# Patient Record
Sex: FEMALE | Race: WHITE | NOT HISPANIC OR LATINO | Employment: OTHER | URBAN - METROPOLITAN AREA
[De-identification: names, ages, dates, MRNs, and addresses within clinical notes are randomized per-mention and may not be internally consistent; named-entity substitution may affect disease eponyms.]

---

## 2023-03-10 PROBLEM — K59.00 CONSTIPATION: Status: ACTIVE | Noted: 2023-03-10

## 2023-03-10 PROBLEM — E55.9 VITAMIN D DEFICIENCY: Status: ACTIVE | Noted: 2023-03-10

## 2023-03-10 PROBLEM — F41.9 ANXIETY: Status: ACTIVE | Noted: 2023-03-10

## 2023-03-10 PROBLEM — K64.9 BLEEDING HEMORRHOIDS: Status: ACTIVE | Noted: 2023-03-10

## 2023-03-10 PROBLEM — I10 HYPERTENSION: Status: ACTIVE | Noted: 2023-03-10

## 2023-03-10 PROBLEM — N39.0 URINARY TRACT INFECTION: Status: ACTIVE | Noted: 2023-03-10

## 2023-03-10 PROBLEM — F43.21 GRIEVING: Status: ACTIVE | Noted: 2023-03-10

## 2023-03-10 PROBLEM — J30.9 ALLERGIC RHINITIS: Status: ACTIVE | Noted: 2023-03-10

## 2023-03-10 PROBLEM — E53.8 B12 DEFICIENCY: Status: ACTIVE | Noted: 2023-03-10

## 2023-03-10 PROBLEM — D50.9 IRON DEFICIENCY ANEMIA: Status: ACTIVE | Noted: 2023-03-10

## 2023-03-10 PROBLEM — B37.31 YEAST VAGINITIS: Status: ACTIVE | Noted: 2023-03-10

## 2023-03-10 PROBLEM — M25.559 JOINT PAIN, HIP: Status: ACTIVE | Noted: 2023-03-10

## 2023-03-10 PROBLEM — R53.83 FATIGUE: Status: ACTIVE | Noted: 2023-03-10

## 2023-03-10 PROBLEM — E78.5 HYPERLIPIDEMIA: Status: ACTIVE | Noted: 2023-03-10

## 2023-03-10 PROBLEM — L85.3 DRY SKIN: Status: ACTIVE | Noted: 2023-03-10

## 2023-03-10 PROBLEM — N18.9 CKD (CHRONIC KIDNEY DISEASE): Status: ACTIVE | Noted: 2023-03-10

## 2023-03-10 PROBLEM — K62.5 RECTAL BLEEDING: Status: ACTIVE | Noted: 2023-03-10

## 2023-03-10 PROBLEM — G47.00 INSOMNIA: Status: ACTIVE | Noted: 2023-03-10

## 2023-03-10 PROBLEM — J02.9 PHARYNGITIS: Status: ACTIVE | Noted: 2023-03-10

## 2023-03-10 PROBLEM — D64.9 ANEMIA: Status: ACTIVE | Noted: 2023-03-10

## 2023-03-10 PROBLEM — I48.91 AFIB (MULTI): Status: ACTIVE | Noted: 2023-03-10

## 2023-03-10 PROBLEM — L30.9 DERMATITIS: Status: ACTIVE | Noted: 2023-03-10

## 2023-03-10 PROBLEM — E11.9 DIABETES MELLITUS, TYPE 2 (MULTI): Status: ACTIVE | Noted: 2023-03-10

## 2023-03-10 PROBLEM — I42.9 CARDIOMYOPATHY (MULTI): Status: ACTIVE | Noted: 2023-03-10

## 2023-03-10 PROBLEM — R51.9 HEADACHE: Status: ACTIVE | Noted: 2023-03-10

## 2023-03-10 PROBLEM — M79.10 MUSCULAR ACHES: Status: ACTIVE | Noted: 2023-03-10

## 2023-03-10 PROBLEM — F43.9 STRESS AT HOME: Status: ACTIVE | Noted: 2023-03-10

## 2023-03-10 PROBLEM — J20.9 ACUTE BRONCHITIS: Status: RESOLVED | Noted: 2023-03-10 | Resolved: 2023-03-10

## 2023-03-10 PROBLEM — K21.9 GERD (GASTROESOPHAGEAL REFLUX DISEASE): Status: ACTIVE | Noted: 2023-03-10

## 2023-03-10 PROBLEM — L65.9 HAIR THINNING: Status: ACTIVE | Noted: 2023-03-10

## 2023-03-10 PROBLEM — I50.20 SYSTOLIC CHF (MULTI): Status: ACTIVE | Noted: 2023-03-10

## 2023-03-10 PROBLEM — J34.0 CELLULITIS OF NASAL TIP: Status: ACTIVE | Noted: 2023-03-10

## 2023-03-10 PROBLEM — R05.9 COUGH: Status: ACTIVE | Noted: 2023-03-10

## 2023-03-10 PROBLEM — K13.0 ANGULAR CHEILOSIS: Status: ACTIVE | Noted: 2023-03-10

## 2023-03-10 RX ORDER — INSULIN LISPRO 100 [IU]/ML
INJECTION, SOLUTION INTRAVENOUS; SUBCUTANEOUS SEE ADMIN INSTRUCTIONS
COMMUNITY
Start: 2021-12-15 | End: 2024-03-15 | Stop reason: WASHOUT

## 2023-03-10 RX ORDER — BLOOD-GLUCOSE TRANSMITTER
1 EACH MISCELLANEOUS AS NEEDED
COMMUNITY
End: 2023-12-15 | Stop reason: WASHOUT

## 2023-03-10 RX ORDER — BLOOD-GLUCOSE,RECEIVER,CONT
1 EACH MISCELLANEOUS AS NEEDED
COMMUNITY
End: 2023-12-15 | Stop reason: WASHOUT

## 2023-03-10 RX ORDER — ESOMEPRAZOLE MAGNESIUM 40 MG/1
40 CAPSULE, DELAYED RELEASE ORAL DAILY
COMMUNITY
Start: 2021-07-02 | End: 2023-07-12 | Stop reason: SDUPTHER

## 2023-03-10 RX ORDER — SPIRONOLACTONE 25 MG/1
25 TABLET ORAL DAILY
COMMUNITY
End: 2023-07-12 | Stop reason: SDUPTHER

## 2023-03-10 RX ORDER — ATORVASTATIN CALCIUM 20 MG/1
20 TABLET, FILM COATED ORAL DAILY
COMMUNITY
Start: 2013-12-21 | End: 2023-04-18 | Stop reason: SDUPTHER

## 2023-03-10 RX ORDER — VENLAFAXINE HYDROCHLORIDE 150 MG/1
150 CAPSULE, EXTENDED RELEASE ORAL DAILY
COMMUNITY
Start: 2013-03-14 | End: 2023-07-12 | Stop reason: SDUPTHER

## 2023-03-10 RX ORDER — METOPROLOL SUCCINATE 100 MG/1
100 TABLET, EXTENDED RELEASE ORAL 2 TIMES DAILY
COMMUNITY
Start: 2022-01-26 | End: 2023-08-07 | Stop reason: SDUPTHER

## 2023-03-10 RX ORDER — LISINOPRIL 10 MG/1
20 TABLET ORAL DAILY
COMMUNITY
Start: 2022-11-06 | End: 2023-03-16 | Stop reason: SDUPTHER

## 2023-03-10 RX ORDER — BLOOD-GLUCOSE SENSOR
1 EACH MISCELLANEOUS
COMMUNITY

## 2023-03-10 RX ORDER — AMIODARONE HYDROCHLORIDE 200 MG/1
1 TABLET ORAL DAILY
COMMUNITY
End: 2023-06-09 | Stop reason: ALTCHOICE

## 2023-03-10 RX ORDER — METFORMIN HYDROCHLORIDE 500 MG/1
2 TABLET, EXTENDED RELEASE ORAL
COMMUNITY
Start: 2021-12-15

## 2023-03-16 ENCOUNTER — OFFICE VISIT (OUTPATIENT)
Dept: PRIMARY CARE | Facility: CLINIC | Age: 78
End: 2023-03-16
Payer: MEDICARE

## 2023-03-16 VITALS
DIASTOLIC BLOOD PRESSURE: 84 MMHG | HEART RATE: 59 BPM | WEIGHT: 159 LBS | TEMPERATURE: 97.2 F | SYSTOLIC BLOOD PRESSURE: 145 MMHG | RESPIRATION RATE: 18 BRPM | BODY MASS INDEX: 29.08 KG/M2 | OXYGEN SATURATION: 96 %

## 2023-03-16 DIAGNOSIS — F41.9 ANXIETY: ICD-10-CM

## 2023-03-16 DIAGNOSIS — I10 HYPERTENSION, UNSPECIFIED TYPE: Primary | ICD-10-CM

## 2023-03-16 DIAGNOSIS — Z79.4 TYPE 2 DIABETES MELLITUS WITHOUT COMPLICATION, WITH LONG-TERM CURRENT USE OF INSULIN (MULTI): ICD-10-CM

## 2023-03-16 DIAGNOSIS — I42.9 CARDIOMYOPATHY, UNSPECIFIED TYPE (MULTI): ICD-10-CM

## 2023-03-16 DIAGNOSIS — E11.9 TYPE 2 DIABETES MELLITUS WITHOUT COMPLICATION, WITH LONG-TERM CURRENT USE OF INSULIN (MULTI): ICD-10-CM

## 2023-03-16 DIAGNOSIS — E78.5 HYPERLIPIDEMIA, UNSPECIFIED HYPERLIPIDEMIA TYPE: ICD-10-CM

## 2023-03-16 DIAGNOSIS — I48.91 ATRIAL FIBRILLATION, UNSPECIFIED TYPE (MULTI): ICD-10-CM

## 2023-03-16 PROCEDURE — 99214 OFFICE O/P EST MOD 30 MIN: CPT | Performed by: FAMILY MEDICINE

## 2023-03-16 PROCEDURE — 93000 ELECTROCARDIOGRAM COMPLETE: CPT | Performed by: FAMILY MEDICINE

## 2023-03-16 RX ORDER — LISINOPRIL 30 MG/1
30 TABLET ORAL DAILY
Qty: 90 TABLET | Refills: 0 | Status: SHIPPED | OUTPATIENT
Start: 2023-03-16 | End: 2023-05-01 | Stop reason: SDUPTHER

## 2023-03-16 NOTE — PROGRESS NOTES
Subjective   Patient ID: Marissa Rebolledo is a 77 y.o. female who presents for Follow-up (For b ed and sugar readings).    HPI   Patient comes in today in follow-up.  She is feeling much better than last visit as far as her blood pressure is concerned.  She is still anxious about the atrial fibrillation and the cardiac ablation.  She is requesting to know today whether she is still in atrial fib.  Review of Systems   All other systems reviewed and are negative.      Objective   /84   Pulse 59   Temp 36.2 °C (97.2 °F)   Resp 18   Wt 72.1 kg (159 lb)   SpO2 96%   BMI 29.08 kg/m²     Physical Exam  Constitutional:       Appearance: She is well-developed.   HENT:      Head: Normocephalic and atraumatic.      Right Ear: Tympanic membrane, ear canal and external ear normal.      Left Ear: Tympanic membrane, ear canal and external ear normal.      Nose: Nose normal.      Mouth/Throat:      Mouth: Mucous membranes are moist.      Pharynx: Oropharynx is clear.   Eyes:      Extraocular Movements: Extraocular movements intact.      Conjunctiva/sclera: Conjunctivae normal.      Pupils: Pupils are equal, round, and reactive to light.   Cardiovascular:      Rate and Rhythm: Normal rate and regular rhythm.      Heart sounds: Normal heart sounds. No murmur heard.     Comments: I do not detect atrial fib on clinical exam we will get an EKG.  Pulmonary:      Effort: Pulmonary effort is normal.      Breath sounds: Normal breath sounds. No wheezing.   Abdominal:      General: Abdomen is flat. Bowel sounds are normal.      Palpations: Abdomen is soft.   Musculoskeletal:         General: Normal range of motion.   Skin:     General: Skin is warm and dry.   Neurological:      General: No focal deficit present.      Mental Status: She is alert and oriented to person, place, and time. Mental status is at baseline.   Psychiatric:         Mood and Affect: Mood normal.         Behavior: Behavior normal.         Thought Content:  Thought content normal.         Judgment: Judgment normal.         Assessment/Plan   Problem List Items Addressed This Visit       Afib (CMS/[facility])    Anxiety    Diabetes mellitus, type 2 (CMS/HCC)    Hyperlipidemia    Hypertension - Primary    Relevant Medications    lisinopril 30 mg tablet    Cardiomyopathy (CMS/[facility])   EKG shows no evidence of A-fib at this time.  Follow-up with cardiology for further assessment and need for cardiac ablation.  Medication list reconciled.  Thank you for visiting today!      Professional services: Some of this note was completed using Dragon voice technology and sometimes the software misinterprets words. This may include unintended errors with respect to translation of words, typographical errors or grammar errors which may not have been identified prior to finalization of the chart note. Please take this into account when reading the note. Thank you.

## 2023-03-30 ENCOUNTER — OFFICE VISIT (OUTPATIENT)
Dept: PRIMARY CARE | Facility: CLINIC | Age: 78
End: 2023-03-30
Payer: MEDICARE

## 2023-03-30 VITALS
DIASTOLIC BLOOD PRESSURE: 83 MMHG | SYSTOLIC BLOOD PRESSURE: 131 MMHG | TEMPERATURE: 97.9 F | OXYGEN SATURATION: 95 % | WEIGHT: 157 LBS | RESPIRATION RATE: 16 BRPM | BODY MASS INDEX: 28.72 KG/M2 | HEART RATE: 59 BPM

## 2023-03-30 DIAGNOSIS — E11.9 TYPE 2 DIABETES MELLITUS WITHOUT COMPLICATION, WITHOUT LONG-TERM CURRENT USE OF INSULIN (MULTI): ICD-10-CM

## 2023-03-30 DIAGNOSIS — I10 HYPERTENSION, UNSPECIFIED TYPE: Primary | ICD-10-CM

## 2023-03-30 PROCEDURE — 99214 OFFICE O/P EST MOD 30 MIN: CPT | Performed by: FAMILY MEDICINE

## 2023-03-30 NOTE — PROGRESS NOTES
Subjective   Patient ID: Marissa Rebolledo is a 77 y.o. female who presents for Follow-up (For hypertension).    HPI   Patient comes in today for follow-up on her blood pressure.  Overall it is doing much better.  Patient states her blood pressure readings at home are usually less than 130/80 and in the office here today it was 131/83.  She said she notices the blood pressure being higher when her sugar is higher.  In general she states her sugars under pretty good control.  She will be due for hemoglobin A1c next month.    3/16/2023  Patient comes in today in follow-up.  She is feeling much better than last visit as far as her blood pressure is concerned.  She is still anxious about the atrial fibrillation and the cardiac ablation.  She is requesting to know today whether she is still in atrial fib.    Review of Systems   All other systems reviewed and are negative.      Objective   /83   Pulse 59   Temp 36.6 °C (97.9 °F)   Resp 16   Wt 71.2 kg (157 lb)   LMP  (LMP Unknown)   SpO2 95%   BMI 28.72 kg/m²     Physical Exam  Vitals reviewed.   Constitutional:       Appearance: She is well-developed.   HENT:      Head: Normocephalic and atraumatic.      Right Ear: Tympanic membrane, ear canal and external ear normal.      Left Ear: Tympanic membrane, ear canal and external ear normal.      Nose: Nose normal.      Mouth/Throat:      Mouth: Mucous membranes are moist.      Pharynx: Oropharynx is clear.   Eyes:      Extraocular Movements: Extraocular movements intact.      Conjunctiva/sclera: Conjunctivae normal.      Pupils: Pupils are equal, round, and reactive to light.   Cardiovascular:      Rate and Rhythm: Normal rate and regular rhythm.      Heart sounds: Normal heart sounds. No murmur heard.  Pulmonary:      Effort: Pulmonary effort is normal.      Breath sounds: Normal breath sounds. No wheezing.   Abdominal:      General: Abdomen is flat. Bowel sounds are normal.      Palpations: Abdomen is soft.    Musculoskeletal:         General: Normal range of motion.   Skin:     General: Skin is warm and dry.   Neurological:      General: No focal deficit present.      Mental Status: She is alert and oriented to person, place, and time. Mental status is at baseline.   Psychiatric:         Mood and Affect: Mood normal.         Behavior: Behavior normal.         Thought Content: Thought content normal.         Judgment: Judgment normal.         Assessment/Plan   Problem List Items Addressed This Visit       Diabetes mellitus, type 2 (CMS/[facility])    Hypertension - Primary   Continue all current meds.  RTC in one month for recheck, sooner should problems arise.  Medication list reconciled.  Thank you for visiting today!      Professional services: Some of this note was completed using Dragon voice technology and sometimes the software misinterprets words. This may include unintended errors with respect to translation of words, typographical errors or grammar errors which may not have been identified prior to finalization of the chart note. Please take this into account when reading the note. Thank you.

## 2023-04-18 DIAGNOSIS — E78.5 HYPERLIPIDEMIA, UNSPECIFIED HYPERLIPIDEMIA TYPE: Primary | ICD-10-CM

## 2023-04-18 RX ORDER — ATORVASTATIN CALCIUM 20 MG/1
20 TABLET, FILM COATED ORAL DAILY
Qty: 90 TABLET | Refills: 1 | Status: SHIPPED | OUTPATIENT
Start: 2023-04-18 | End: 2023-10-13 | Stop reason: SDUPTHER

## 2023-04-18 NOTE — TELEPHONE ENCOUNTER
Patient is requesting a refill on her     Atorvastatin 20 mg    Sent to [facility]     Thank You

## 2023-04-18 NOTE — TELEPHONE ENCOUNTER
Requested Prescriptions     Pending Prescriptions Disp Refills    atorvastatin (Lipitor) 20 mg tablet 90 tablet 1     Sig: Take 1 tablet (20 mg) by mouth once daily.

## 2023-05-01 ENCOUNTER — LAB (OUTPATIENT)
Dept: LAB | Facility: LAB | Age: 78
End: 2023-05-01
Payer: MEDICARE

## 2023-05-01 ENCOUNTER — OFFICE VISIT (OUTPATIENT)
Dept: PRIMARY CARE | Facility: CLINIC | Age: 78
End: 2023-05-01
Payer: MEDICARE

## 2023-05-01 VITALS
DIASTOLIC BLOOD PRESSURE: 94 MMHG | RESPIRATION RATE: 18 BRPM | SYSTOLIC BLOOD PRESSURE: 160 MMHG | WEIGHT: 158 LBS | HEART RATE: 60 BPM | BODY MASS INDEX: 28.9 KG/M2 | TEMPERATURE: 97.7 F | OXYGEN SATURATION: 96 %

## 2023-05-01 DIAGNOSIS — I10 HYPERTENSION, UNSPECIFIED TYPE: ICD-10-CM

## 2023-05-01 DIAGNOSIS — I48.91 ATRIAL FIBRILLATION, UNSPECIFIED TYPE (MULTI): ICD-10-CM

## 2023-05-01 DIAGNOSIS — D64.9 ANEMIA, UNSPECIFIED TYPE: ICD-10-CM

## 2023-05-01 DIAGNOSIS — E55.9 VITAMIN D DEFICIENCY: ICD-10-CM

## 2023-05-01 DIAGNOSIS — E78.5 HYPERLIPIDEMIA, UNSPECIFIED HYPERLIPIDEMIA TYPE: ICD-10-CM

## 2023-05-01 DIAGNOSIS — Z00.00 ROUTINE GENERAL MEDICAL EXAMINATION AT HEALTH CARE FACILITY: Primary | ICD-10-CM

## 2023-05-01 DIAGNOSIS — I50.20 SYSTOLIC CONGESTIVE HEART FAILURE, UNSPECIFIED HF CHRONICITY (MULTI): ICD-10-CM

## 2023-05-01 DIAGNOSIS — E53.8 B12 DEFICIENCY: ICD-10-CM

## 2023-05-01 DIAGNOSIS — E11.9 TYPE 2 DIABETES MELLITUS WITHOUT COMPLICATION, WITHOUT LONG-TERM CURRENT USE OF INSULIN (MULTI): ICD-10-CM

## 2023-05-01 DIAGNOSIS — I42.9 CARDIOMYOPATHY, UNSPECIFIED TYPE (MULTI): ICD-10-CM

## 2023-05-01 DIAGNOSIS — R31.9 URINARY TRACT INFECTION WITH HEMATURIA, SITE UNSPECIFIED: ICD-10-CM

## 2023-05-01 DIAGNOSIS — N18.31 STAGE 3A CHRONIC KIDNEY DISEASE (MULTI): ICD-10-CM

## 2023-05-01 DIAGNOSIS — N39.0 URINARY TRACT INFECTION WITH HEMATURIA, SITE UNSPECIFIED: ICD-10-CM

## 2023-05-01 LAB
ALANINE AMINOTRANSFERASE (SGPT) (U/L) IN SER/PLAS: 21 U/L (ref 7–45)
ALBUMIN (G/DL) IN SER/PLAS: 4.2 G/DL (ref 3.4–5)
ALKALINE PHOSPHATASE (U/L) IN SER/PLAS: 59 U/L (ref 33–136)
ANION GAP IN SER/PLAS: 10 MMOL/L (ref 10–20)
ASPARTATE AMINOTRANSFERASE (SGOT) (U/L) IN SER/PLAS: 26 U/L (ref 9–39)
BILIRUBIN TOTAL (MG/DL) IN SER/PLAS: 0.4 MG/DL (ref 0–1.2)
CALCIDIOL (25 OH VITAMIN D3) (NG/ML) IN SER/PLAS: 24 NG/ML
CALCIUM (MG/DL) IN SER/PLAS: 9.6 MG/DL (ref 8.6–10.3)
CARBON DIOXIDE, TOTAL (MMOL/L) IN SER/PLAS: 31 MMOL/L (ref 21–32)
CHLORIDE (MMOL/L) IN SER/PLAS: 101 MMOL/L (ref 98–107)
CHOLESTEROL (MG/DL) IN SER/PLAS: 150 MG/DL (ref 0–199)
CHOLESTEROL IN HDL (MG/DL) IN SER/PLAS: 49.4 MG/DL
CHOLESTEROL/HDL RATIO: 3
COBALAMIN (VITAMIN B12) (PG/ML) IN SER/PLAS: 334 PG/ML (ref 211–911)
CREATININE (MG/DL) IN SER/PLAS: 0.94 MG/DL (ref 0.5–1.05)
ERYTHROCYTE DISTRIBUTION WIDTH (RATIO) BY AUTOMATED COUNT: 14.6 % (ref 11.5–14.5)
ERYTHROCYTE MEAN CORPUSCULAR HEMOGLOBIN CONCENTRATION (G/DL) BY AUTOMATED: 31.7 G/DL (ref 32–36)
ERYTHROCYTE MEAN CORPUSCULAR VOLUME (FL) BY AUTOMATED COUNT: 88 FL (ref 80–100)
ERYTHROCYTES (10*6/UL) IN BLOOD BY AUTOMATED COUNT: 4.73 X10E12/L (ref 4–5.2)
ESTIMATED AVERAGE GLUCOSE FOR HBA1C: 157 MG/DL
GFR FEMALE: 63 ML/MIN/1.73M2
GLUCOSE (MG/DL) IN SER/PLAS: 109 MG/DL (ref 74–99)
HEMATOCRIT (%) IN BLOOD BY AUTOMATED COUNT: 41.7 % (ref 36–46)
HEMOGLOBIN (G/DL) IN BLOOD: 13.2 G/DL (ref 12–16)
HEMOGLOBIN A1C/HEMOGLOBIN TOTAL IN BLOOD: 7.1 %
LDL: 77 MG/DL (ref 0–99)
LEUKOCYTES (10*3/UL) IN BLOOD BY AUTOMATED COUNT: 8.5 X10E9/L (ref 4.4–11.3)
PLATELETS (10*3/UL) IN BLOOD AUTOMATED COUNT: 222 X10E9/L (ref 150–450)
POC APPEARANCE, URINE: ABNORMAL
POC BILIRUBIN, URINE: NEGATIVE
POC BLOOD, URINE: ABNORMAL
POC COLOR, URINE: YELLOW
POC GLUCOSE, URINE: NEGATIVE MG/DL
POC KETONES, URINE: NEGATIVE MG/DL
POC LEUKOCYTES, URINE: ABNORMAL
POC NITRITE,URINE: POSITIVE
POC PH, URINE: 6.5 PH
POC PROTEIN, URINE: ABNORMAL MG/DL
POC SPECIFIC GRAVITY, URINE: 1.02
POC UROBILINOGEN, URINE: 0.2 EU/DL
POTASSIUM (MMOL/L) IN SER/PLAS: 4.4 MMOL/L (ref 3.5–5.3)
PROTEIN TOTAL: 7.4 G/DL (ref 6.4–8.2)
SODIUM (MMOL/L) IN SER/PLAS: 138 MMOL/L (ref 136–145)
THYROTROPIN (MIU/L) IN SER/PLAS BY DETECTION LIMIT <= 0.05 MIU/L: 18.25 MIU/L (ref 0.44–3.98)
THYROXINE (T4) FREE (NG/DL) IN SER/PLAS: 0.45 NG/DL (ref 0.61–1.12)
TRIGLYCERIDE (MG/DL) IN SER/PLAS: 117 MG/DL (ref 0–149)
UREA NITROGEN (MG/DL) IN SER/PLAS: 13 MG/DL (ref 6–23)
VLDL: 23 MG/DL (ref 0–40)

## 2023-05-01 PROCEDURE — 87077 CULTURE AEROBIC IDENTIFY: CPT

## 2023-05-01 PROCEDURE — 84443 ASSAY THYROID STIM HORMONE: CPT

## 2023-05-01 PROCEDURE — 36415 COLL VENOUS BLD VENIPUNCTURE: CPT

## 2023-05-01 PROCEDURE — 3077F SYST BP >= 140 MM HG: CPT | Performed by: FAMILY MEDICINE

## 2023-05-01 PROCEDURE — 80061 LIPID PANEL: CPT

## 2023-05-01 PROCEDURE — 1159F MED LIST DOCD IN RCRD: CPT | Performed by: FAMILY MEDICINE

## 2023-05-01 PROCEDURE — 80053 COMPREHEN METABOLIC PANEL: CPT

## 2023-05-01 PROCEDURE — 85027 COMPLETE CBC AUTOMATED: CPT

## 2023-05-01 PROCEDURE — 87086 URINE CULTURE/COLONY COUNT: CPT

## 2023-05-01 PROCEDURE — 99213 OFFICE O/P EST LOW 20 MIN: CPT | Performed by: FAMILY MEDICINE

## 2023-05-01 PROCEDURE — 3080F DIAST BP >= 90 MM HG: CPT | Performed by: FAMILY MEDICINE

## 2023-05-01 PROCEDURE — 82607 VITAMIN B-12: CPT

## 2023-05-01 PROCEDURE — 87186 SC STD MICRODIL/AGAR DIL: CPT

## 2023-05-01 PROCEDURE — 84439 ASSAY OF FREE THYROXINE: CPT

## 2023-05-01 PROCEDURE — G0439 PPPS, SUBSEQ VISIT: HCPCS | Performed by: FAMILY MEDICINE

## 2023-05-01 PROCEDURE — 83036 HEMOGLOBIN GLYCOSYLATED A1C: CPT

## 2023-05-01 PROCEDURE — 1170F FXNL STATUS ASSESSED: CPT | Performed by: FAMILY MEDICINE

## 2023-05-01 PROCEDURE — 81003 URINALYSIS AUTO W/O SCOPE: CPT | Performed by: FAMILY MEDICINE

## 2023-05-01 PROCEDURE — 82306 VITAMIN D 25 HYDROXY: CPT

## 2023-05-01 PROCEDURE — 1036F TOBACCO NON-USER: CPT | Performed by: FAMILY MEDICINE

## 2023-05-01 RX ORDER — SULFAMETHOXAZOLE AND TRIMETHOPRIM 800; 160 MG/1; MG/1
1 TABLET ORAL 2 TIMES DAILY
Qty: 14 TABLET | Refills: 0 | Status: SHIPPED | OUTPATIENT
Start: 2023-05-01 | End: 2023-05-02 | Stop reason: SINTOL

## 2023-05-01 RX ORDER — LISINOPRIL 40 MG/1
40 TABLET ORAL DAILY
Qty: 90 TABLET | Refills: 0 | Status: SHIPPED | OUTPATIENT
Start: 2023-05-01 | End: 2023-07-12 | Stop reason: SDUPTHER

## 2023-05-01 ASSESSMENT — ENCOUNTER SYMPTOMS
OCCASIONAL FEELINGS OF UNSTEADINESS: 0
DEPRESSION: 0
LOSS OF SENSATION IN FEET: 0

## 2023-05-01 ASSESSMENT — ACTIVITIES OF DAILY LIVING (ADL)
DOING_HOUSEWORK: INDEPENDENT
TAKING_MEDICATION: INDEPENDENT
GROCERY_SHOPPING: INDEPENDENT
DRESSING: INDEPENDENT
MANAGING_FINANCES: INDEPENDENT
BATHING: INDEPENDENT

## 2023-05-01 ASSESSMENT — PATIENT HEALTH QUESTIONNAIRE - PHQ9
2. FEELING DOWN, DEPRESSED OR HOPELESS: NOT AT ALL
SUM OF ALL RESPONSES TO PHQ9 QUESTIONS 1 AND 2: 0
1. LITTLE INTEREST OR PLEASURE IN DOING THINGS: NOT AT ALL

## 2023-05-01 NOTE — PROGRESS NOTES
I have reviewed discharge instructions with the parent. The parent verbalized understanding. Patient left ED via Discharge Method: ambulatory to Home with [facility]. Opportunity for questions and clarification provided. Patient given 0 scripts. To continue your aftercare when you leave the hospital, you may receive an automated call from our care team to check in on how you are doing. This is a free service and part of our promise to provide the best care and service to meet your aftercare needs.  If you have questions, or wish to unsubscribe from this service please call [phone]. Thank you for Choosing our [facility] Emergency Department.         Stephanie Cross RN  02/25/23 3139 Subjective   Reason for Visit: Marissa Rebolledo is an 77 y.o. female here for a Medicare Wellness visit.          Reviewed all medications by prescribing practitioner or clinical pharmacist (such as prescriptions, OTCs, herbal therapies and supplements) and documented in the medical record.    HPI    Patient Self Assessment of Health Status  Patient Self Assessment: Excellent    Nutrition and Exercise  Current Diet: Well Balanced Diet  Adequate Fluid Intake: Yes  Caffeine: Yes  Exercise Frequency: Infrequently         Home Safety Risk Factors: None    Patient Care Team:  Chris Contreras DO as PCP - General  Chris Contreras DO as PCP [place] [facility]     Review of Systems    Objective   Vitals:  BP (!) 160/94   Pulse 60   Temp 36.5 °C (97.7 °F)   Resp 18   Wt 71.7 kg (158 lb)   LMP  (LMP Unknown)   SpO2 96%   BMI 28.90 kg/m²       Physical Exam    Assessment/Plan   Problem List Items Addressed This Visit    None

## 2023-05-01 NOTE — PROGRESS NOTES
Subjective   Reason for Visit: Marissa Rebolledo is an 77 y.o. female here for a Medicare Wellness visit.          Reviewed all medications by prescribing practitioner or clinical pharmacist (such as prescriptions, OTCs, herbal therapies and supplements) and documented in the medical record.    HPI  Patient comes in today for follow-up on her blood pressure and as well as for Medicare wellness exam.  She brings with her readings from home and they are all averaging on the high side.  She did not take her blood pressure medicine this morning and her blood pressure is high here today.    Patient also complaining of urinary tract symptoms.  She is having frequency and urgency and right lower back pain.  Her UA today is positive.    3/30/2023  Patient comes in today for follow-up on her blood pressure.  Overall it is doing much better.  Patient states her blood pressure readings at home are usually less than 130/80 and in the office here today it was 131/83.  She said she notices the blood pressure being higher when her sugar is higher.  In general she states her sugars under pretty good control.  She will be due for hemoglobin A1c next month.    3/16/2023  Patient comes in today in follow-up.  She is feeling much better than last visit as far as her blood pressure is concerned.  She is still anxious about the atrial fibrillation and the cardiac ablation.  She is requesting to know today whether she is still in atrial fib.    Patient Care Team:  Chris Contreras DO as PCP - General  [facility] Cont[facility] DO [facility]     Review of Systems   All other systems reviewed and are negative.      Objective   Vitals:  BP (!) 160/94   Pulse 60   Temp 36.5 °C (97.7 °F)   Resp 18   Wt 71.7 kg (158 lb)   LMP  (LMP Unknown)   SpO2 96%   BMI 28.90 kg/m²       Physical Exam  Vitals reviewed.   Constitutional:       Appearance: She is well-developed.   HENT:      Head: Normocephalic and atraumatic.       Right Ear: Tympanic membrane, ear canal and external ear normal.      Left Ear: Tympanic membrane, ear canal and external ear normal.      Nose: Nose normal.      Mouth/Throat:      Mouth: Mucous membranes are moist.      Pharynx: Oropharynx is clear.   Eyes:      Extraocular Movements: Extraocular movements intact.      Conjunctiva/sclera: Conjunctivae normal.      Pupils: Pupils are equal, round, and reactive to light.   Cardiovascular:      Rate and Rhythm: Normal rate and regular rhythm.      Heart sounds: Normal heart sounds. No murmur heard.  Pulmonary:      Effort: Pulmonary effort is normal.      Breath sounds: Normal breath sounds. No wheezing.   Abdominal:      General: Abdomen is flat. Bowel sounds are normal.      Palpations: Abdomen is soft.      Tenderness: There is abdominal tenderness (Positive suprapubic tenderness to palpation, no flank tenderness.).   Musculoskeletal:         General: Normal range of motion.   Skin:     General: Skin is warm and dry.   Neurological:      General: No focal deficit present.      Mental Status: She is alert and oriented to person, place, and time. Mental status is at baseline.   Psychiatric:         Mood and Affect: Mood normal.         Behavior: Behavior normal.         Thought Content: Thought content normal.         Judgment: Judgment normal.         Assessment/Plan   Problem List Items Addressed This Visit       Afib (CMS/[facility])    Overview     Patient being followed by Dr. Tucker cardiology         Relevant Orders    TSH with reflex to Free T4 if abnormal    Anemia    Relevant Orders    CBC    B12 deficiency    Relevant Orders    CBC    Vitamin B12    CKD (chronic kidney disease)    Diabetes mellitus, type 2 (CMS/[facility])    Overview     We will be checking hemoglobin A1c today         Relevant Orders    Hemoglobin A1C    Hyperlipidemia    Relevant Orders    Lipid Panel    Hypertension    Relevant Medications    lisinopril 40 mg tablet    Other Relevant Orders     Comprehensive Metabolic Panel    Vitamin D deficiency    Relevant Orders    Vitamin D, Total    Urinary tract infection    Relevant Medications    sulfamethoxazole-trimethoprim (Bactrim DS) 800-160 mg tablet    Other Relevant Orders    POCT UA Automated manually resulted (Completed)    Urine Culture    Cardiomyopathy (CMS/[facility])    Overview     Patient being followed by Dr. Tucker cardiology.         Systolic CHF (CMS/[facility])    Overview     Patient being followed by Dr. Tucker cardiology.          Other Visit Diagnoses       Routine general medical examination at health care facility    -  Primary        Will contact patient with lab results when available.  Take new dose of medication as directed.  Continue other medications as directed.  RTC in one month for recheck, sooner should problems arise.  Medication list reconciled.  Thank you for visiting today!      Professional services: Some of this note was completed using Dragon voice technology and sometimes the software misinterprets words. This may include unintended errors with respect to translation of words, typographical errors or grammar errors which may not have been identified prior to finalization of the chart note. Please take this into account when reading the note. Thank you.

## 2023-05-02 ENCOUNTER — TELEPHONE (OUTPATIENT)
Dept: PRIMARY CARE | Facility: CLINIC | Age: 78
End: 2023-05-02
Payer: MEDICARE

## 2023-05-02 DIAGNOSIS — E03.9 HYPOTHYROIDISM, UNSPECIFIED TYPE: Primary | ICD-10-CM

## 2023-05-02 DIAGNOSIS — N39.0 URINARY TRACT INFECTION WITH HEMATURIA, SITE UNSPECIFIED: Primary | ICD-10-CM

## 2023-05-02 DIAGNOSIS — R31.9 URINARY TRACT INFECTION WITH HEMATURIA, SITE UNSPECIFIED: Primary | ICD-10-CM

## 2023-05-02 RX ORDER — LEVOTHYROXINE SODIUM 75 UG/1
75 TABLET ORAL DAILY
Qty: 90 TABLET | Refills: 1 | Status: SHIPPED | OUTPATIENT
Start: 2023-05-02 | End: 2023-06-09 | Stop reason: ALTCHOICE

## 2023-05-02 RX ORDER — PHENAZOPYRIDINE HYDROCHLORIDE 200 MG/1
200 TABLET, FILM COATED ORAL 3 TIMES DAILY
Qty: 9 TABLET | Refills: 0 | Status: SHIPPED | OUTPATIENT
Start: 2023-05-02 | End: 2023-06-01

## 2023-05-02 NOTE — TELEPHONE ENCOUNTER
----- Message from Chris Contreras DO sent at 5/2/2023  7:48 AM EDT -----  Regarding: Labs  Please notify patient of low vitamin D of 24.  It should be between 30 and 100 the closer to 50 the better. It is an important vitamin for your heart, lungs, immune system and bones among other things in your body. Would recommend OTC vitamin D3 2000 units daily to get it into and keep it in the normal range and recheck in August 2023.    Patient has underactive thyroid gland.  This could contribute to her cardiac arrhythmia.  She will need to start thyroid medication and take it daily and will recheck the thyroid level in August 2023.  Prescription sent to her pharmacy.    Her blood sugar is improving.  Her HbA1c came down from 7.9 in January to 7.1 now, we would like to see it less than 7.  Continue current medicine and continue to watch sugar, carbs and starches in the diet.      Her B12 level was low at 334.  It should be above 400, when less than 400 one may have both neurological and/or psychological symptoms. Would recommend B-12 shots monthly for 3 months and recheck in August 2023.  These can be self injected at home or set up in our office as a nurse visit.    All the rest the labs are good.  Continue current medicines and recheck in 1 year.  ----- Message -----  From: Lab, Background User  Sent: 5/1/2023   4:53 PM EDT  To: Chris Contreras DO

## 2023-05-02 NOTE — TELEPHONE ENCOUNTER
Patient called in asking if something else can be sent in for her UTI besides Bactrim because she has an allergic reaction to the medication.  Patient can be reached at [phone].  Patient states that the prescription can be sent to [facility].    Thank You

## 2023-05-02 NOTE — TELEPHONE ENCOUNTER
I spoke with patient this afternoon and we will send in Pyridium and wait for patient's urine culture to come back before we prescribe antibiotics.  She is agreeable to do so.  She is leaving for [place] this Friday for a week.

## 2023-05-02 NOTE — TELEPHONE ENCOUNTER
Pt was given results. No questions    She states she is allergic to bactrim and is asking if something else can be called in please.

## 2023-05-03 LAB — URINE CULTURE: ABNORMAL

## 2023-05-04 DIAGNOSIS — R31.9 URINARY TRACT INFECTION WITH HEMATURIA, SITE UNSPECIFIED: ICD-10-CM

## 2023-05-04 DIAGNOSIS — N39.0 URINARY TRACT INFECTION WITH HEMATURIA, SITE UNSPECIFIED: ICD-10-CM

## 2023-05-04 DIAGNOSIS — E53.8 B12 DEFICIENCY: Primary | ICD-10-CM

## 2023-05-04 RX ORDER — CYANOCOBALAMIN 1000 UG/ML
1000 INJECTION, SOLUTION INTRAMUSCULAR; SUBCUTANEOUS
Qty: 3 ML | Refills: 0 | Status: SHIPPED | OUTPATIENT
Start: 2023-05-04 | End: 2023-07-17 | Stop reason: SDUPTHER

## 2023-05-04 RX ORDER — AMOXICILLIN 875 MG/1
875 TABLET, FILM COATED ORAL 2 TIMES DAILY
Qty: 14 TABLET | Refills: 0 | Status: SHIPPED | OUTPATIENT
Start: 2023-05-04 | End: 2023-05-14

## 2023-06-01 ENCOUNTER — OFFICE VISIT (OUTPATIENT)
Dept: PRIMARY CARE | Facility: CLINIC | Age: 78
End: 2023-06-01
Payer: MEDICARE

## 2023-06-01 VITALS
RESPIRATION RATE: 18 BRPM | DIASTOLIC BLOOD PRESSURE: 91 MMHG | HEART RATE: 53 BPM | BODY MASS INDEX: 29.08 KG/M2 | SYSTOLIC BLOOD PRESSURE: 170 MMHG | TEMPERATURE: 97.2 F | WEIGHT: 159 LBS | OXYGEN SATURATION: 94 %

## 2023-06-01 DIAGNOSIS — R31.9 URINARY TRACT INFECTION WITH HEMATURIA, SITE UNSPECIFIED: Primary | ICD-10-CM

## 2023-06-01 DIAGNOSIS — I10 HYPERTENSION, UNSPECIFIED TYPE: ICD-10-CM

## 2023-06-01 DIAGNOSIS — E53.8 B12 DEFICIENCY: ICD-10-CM

## 2023-06-01 DIAGNOSIS — N39.0 URINARY TRACT INFECTION WITH HEMATURIA, SITE UNSPECIFIED: Primary | ICD-10-CM

## 2023-06-01 LAB
POC APPEARANCE, URINE: ABNORMAL
POC BILIRUBIN, URINE: NEGATIVE
POC BLOOD, URINE: ABNORMAL
POC COLOR, URINE: YELLOW
POC GLUCOSE, URINE: NEGATIVE MG/DL
POC KETONES, URINE: NEGATIVE MG/DL
POC LEUKOCYTES, URINE: ABNORMAL
POC NITRITE,URINE: POSITIVE
POC PH, URINE: 5.5 PH
POC PROTEIN, URINE: NEGATIVE MG/DL
POC SPECIFIC GRAVITY, URINE: 1.02
POC UROBILINOGEN, URINE: 0.2 EU/DL

## 2023-06-01 PROCEDURE — 96372 THER/PROPH/DIAG INJ SC/IM: CPT | Performed by: FAMILY MEDICINE

## 2023-06-01 PROCEDURE — 81003 URINALYSIS AUTO W/O SCOPE: CPT | Performed by: FAMILY MEDICINE

## 2023-06-01 PROCEDURE — 87186 SC STD MICRODIL/AGAR DIL: CPT

## 2023-06-01 PROCEDURE — 87077 CULTURE AEROBIC IDENTIFY: CPT

## 2023-06-01 PROCEDURE — 87086 URINE CULTURE/COLONY COUNT: CPT

## 2023-06-01 PROCEDURE — 99214 OFFICE O/P EST MOD 30 MIN: CPT | Performed by: FAMILY MEDICINE

## 2023-06-01 RX ORDER — CYANOCOBALAMIN 1000 UG/ML
1000 INJECTION, SOLUTION INTRAMUSCULAR; SUBCUTANEOUS ONCE
Status: COMPLETED | OUTPATIENT
Start: 2023-06-01 | End: 2023-06-01

## 2023-06-01 RX ADMIN — CYANOCOBALAMIN 1000 MCG: 1000 INJECTION, SOLUTION INTRAMUSCULAR; SUBCUTANEOUS at 12:45

## 2023-06-01 NOTE — PROGRESS NOTES
Subjective   Patient ID: Marissa Rebolledo is a 77 y.o. female who presents for Follow-up (On BP, thyroid meds, and bladder infection. ) and B12 Injection (2/3. Pt will get her last at her local pharm. ).    HPI   Patient returns today for follow-up on her blood pressure, UTI and low B12 level.  Patient's blood pressure is high here today however she states that is good at home.  Also she is still experiencing some symptoms of a UTI.  Her urine today is positive for blood, nitrates and leukocytes.  We will send it for culture.  Finally she would like a B12 shot today while she is here.    5/1/2023  Patient comes in today for follow-up on her blood pressure and as well as for Medicare wellness exam.  She brings with her readings from home and they are all averaging on the high side.  She did not take her blood pressure medicine this morning and her blood pressure is high here today.    Patient also complaining of urinary tract symptoms.  She is having frequency and urgency and right lower back pain.  Her UA today is positive.    3/30/2023  Patient comes in today for follow-up on her blood pressure.  Overall it is doing much better.  Patient states her blood pressure readings at home are usually less than 130/80 and in the office here today it was 131/83.  She said she notices the blood pressure being higher when her sugar is higher.  In general she states her sugars under pretty good control.  She will be due for hemoglobin A1c next month.    3/16/2023  Patient comes in today in follow-up.  She is feeling much better than last visit as far as her blood pressure is concerned.  She is still anxious about the atrial fibrillation and the cardiac ablation.  She is requesting to know today whether she is still in atrial fib.        3/3/2023  Patient comes in today for follow-up on recent cardiac procedure. According to the patient she went into [facility] and got prepped for cardiac ablation, but the machine  would not work during the procedure, they were only able to perform half of the procedure. She now has to go back and have the procedure completed. She naturally is quite nervous and upset about having to go back and do it again. She is not comfortable going back to [place] and considering having it done at  main [place] or [facility].    Blood pressure has been high. She states she has been taken off of the diltiazem and has been noting high blood pressures at home.    She states her sugars have been good, usually 130 or less but occasionally up to 160. She cannot take more than 1 metformin without getting diarrhea.    1/16/2023  Patient comes in today for checkup and follow-up on her meds. She had been placed on Jardiance by another provider and is having issues with nausea and shakes with the medication. We will have her increase her metformin to 2 pills twice a day and stop the Jardiance for now. She is also complaining about the cost of the Jardiance and the Eliquis. She is scheduled to have cardiac ablation next month on 2/15/2023.    2/25/2022  Patient comes in today for Medicare wellness exam. She has been through a lot in the last few months. She had a severe COVID infection in December which caused her to be hospitalized and her  passed away a few weeks ago from cancer. She claims she has good support at home. She is not requesting any new medications at this time. She would like to try a Dexcom or freestyle nickie because her fingers are getting sore from pricking them for blood sugar checks.    1/26/2022  Patient comes in today for follow-up from recent hospitalization for pneumonia, bleeding hemorrhoid, atrial fibrillation at [facility]. She was released on 1/14/2022. Since she has been home she has been doing a little better however she has been under a lot of stress at home as her  has been in and out of the hospital as well. He just went back into the hospital 2 days ago for  hypotension and C. difficile diarrhea. So far the patient herself does not have diarrhea. Her hemoglobin on discharge was 8.7 and a repeat done on 2021 was 10.1. She had a hemorrhoidectomy done on 2022 by Dr. Kanika Flores at [facility] and Dr. Ascencio has been the cardiologist managing her atrial fibrillation.    Patient was in the hospital in December as well, from 2021 until 2021 with COVID bilateral pneumonia, diabetes and A. fib. She has been checking her sugars at home and uses a Humalog KwikPen for sugars above 150 and she has to use it very infrequently.    2021  Patient comes in today she has been under a great deal of stress. Her mother just passed away last night in [place]. She is leaving here  evening to attend the  on Monday. Her  just recently fell and broke his hip and he currently is recovering in a nursing home. About a week ago he was getting out of the car and twisted and fell backward against the patient and they both fell down and now her left hip is sore.    Patient is feeling chronically fatigued and tired. She was recently in to see the hematologist and her iron level is low. She also has a history of low B12 and vitamin D which have not been checked in a while. Also her diabetes has not been checked.    2021  Patient comes in today with her  for Medicare wellness exam and multiple other issues. She first of all is complaining about elevated blood pressure. She has been getting readings at home apparently as high as 180/90. She has been under a lot of stress. She and her  bought a farm for [employer] and that is where they live in [place] and because of his poor health they now have it up for sale. Apparently the sale is pending and they did not get what they hoped. In the meantime she herself is in charge of packing and getting all the stuff together. 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 Now she has to get rid of all of it. There is pressure with that and taking care of her sickly  who had a liver transplant last year and is not doing so well. They also have a son who is just starting radiation therapy for tonsil cancer at [facility].    Patient has been having headaches likely from the blood pressure and stress.    Patient has experienced a 10 pound weight gain.    The back of her neck has been sore again likely from stress.    Patient also has been experiencing nasal congestion and runny nose.    6/11/2020  Patient presents today for checkup and refill of meds. She currently is complaining of fatigue. In the past she has had low vitamin levels as well as low iron. She is due for recheck of several of her labs. She states that she is taking her medicines as directed and is not having any side effects from them. Her  just had a liver transplant a week ago at [facility] and just got out of the ICU yesterday. The patient is living in town here with her daughter.    10/17/19  Patient comes in today for follow-up on her blood sugar. She claims that it is doing well. She has been taking the Januvia as directed but she is having problems affording it. She was wondering if there is something else that can be used. She otherwise is currently without complaints.    7/19/19  Patient comes in today for further evaluation. She was in to see her oncologist on 6/29/19 and he did some lab work and told her that she had an elevated blood sugar at 207. She is here today in follow-up. She admits that she has not been watching her diet and has been eating a lot of bread, ice cream, cake, noodles etc. She was up late last night as a she had a new great-granddaughter born.     Review of Systems   All other systems reviewed and are negative.      Objective   BP (!) 170/91   Pulse 53   Temp 36.2 °C (97.2 °F)   Resp 18   Wt  72.1 kg (159 lb)   LMP  (LMP Unknown)   SpO2 94%   BMI 29.08 kg/m²     Physical Exam  Vitals reviewed.   Constitutional:       Appearance: She is well-developed.   HENT:      Head: Normocephalic and atraumatic.      Right Ear: Tympanic membrane, ear canal and external ear normal.      Left Ear: Tympanic membrane, ear canal and external ear normal.      Nose: Nose normal.      Mouth/Throat:      Mouth: Mucous membranes are moist.      Pharynx: Oropharynx is clear.   Eyes:      Extraocular Movements: Extraocular movements intact.      Conjunctiva/sclera: Conjunctivae normal.      Pupils: Pupils are equal, round, and reactive to light.   Cardiovascular:      Rate and Rhythm: Normal rate and regular rhythm.      Heart sounds: Normal heart sounds. No murmur heard.  Pulmonary:      Effort: Pulmonary effort is normal.      Breath sounds: Normal breath sounds. No wheezing.   Abdominal:      General: Abdomen is flat. Bowel sounds are normal.      Palpations: Abdomen is soft.   Musculoskeletal:         General: Normal range of motion.   Skin:     General: Skin is warm and dry.   Neurological:      General: No focal deficit present.      Mental Status: She is alert and oriented to person, place, and time. Mental status is at baseline.   Psychiatric:         Mood and Affect: Mood normal.         Behavior: Behavior normal.         Thought Content: Thought content normal.         Judgment: Judgment normal.         Assessment/Plan   Problem List Items Addressed This Visit       B12 deficiency    Hypertension    Urinary tract infection - Primary    Relevant Orders    POCT UA Automated manually resulted (Completed)    Urine Culture (Completed)   Patient encouraged to drink lots of fluids and cranberry juice.  Will notify patient of urine culture results when available.  Use meds as directed.  Return to clinic if symptoms do not improve in 7-10 days, sooner if condition worsens.  B12 shot today.  Continue all current meds.  RTC in  one month for recheck, sooner should problems arise.  Medication list reconciled.  Thank you for visiting today!      Professional services: Some of this note was completed using Dragon voice technology and sometimes the software misinterprets words. This may include unintended errors with respect to translation of words, typographical errors or grammar errors which may not have been identified prior to finalization of the chart note. Please take this into account when reading the note. Thank you.

## 2023-06-03 LAB — URINE CULTURE: ABNORMAL

## 2023-06-07 ENCOUNTER — TELEPHONE (OUTPATIENT)
Dept: PRIMARY CARE | Facility: CLINIC | Age: 78
End: 2023-06-07
Payer: MEDICARE

## 2023-06-07 DIAGNOSIS — R31.9 URINARY TRACT INFECTION WITH HEMATURIA, SITE UNSPECIFIED: Primary | ICD-10-CM

## 2023-06-07 DIAGNOSIS — N39.0 URINARY TRACT INFECTION WITH HEMATURIA, SITE UNSPECIFIED: Primary | ICD-10-CM

## 2023-06-07 RX ORDER — AMOXICILLIN AND CLAVULANATE POTASSIUM 875; 125 MG/1; MG/1
875 TABLET, FILM COATED ORAL 2 TIMES DAILY
Qty: 14 TABLET | Refills: 0 | Status: SHIPPED | OUTPATIENT
Start: 2023-06-07 | End: 2023-06-14

## 2023-06-07 NOTE — TELEPHONE ENCOUNTER
Patient is calling asking to go over the results of her urine test from last Friday.  Patient can be reached at [phone].    Thank You

## 2023-06-09 ENCOUNTER — OFFICE VISIT (OUTPATIENT)
Dept: PRIMARY CARE | Facility: CLINIC | Age: 78
End: 2023-06-09
Payer: MEDICARE

## 2023-06-09 VITALS
TEMPERATURE: 98 F | BODY MASS INDEX: 29.08 KG/M2 | SYSTOLIC BLOOD PRESSURE: 158 MMHG | HEART RATE: 54 BPM | DIASTOLIC BLOOD PRESSURE: 83 MMHG | WEIGHT: 159 LBS | OXYGEN SATURATION: 98 % | RESPIRATION RATE: 18 BRPM

## 2023-06-09 DIAGNOSIS — E03.9 HYPOTHYROIDISM, UNSPECIFIED TYPE: ICD-10-CM

## 2023-06-09 PROCEDURE — 99213 OFFICE O/P EST LOW 20 MIN: CPT | Performed by: FAMILY MEDICINE

## 2023-06-09 RX ORDER — LEVOTHYROXINE SODIUM 25 UG/1
25 TABLET ORAL DAILY
Qty: 30 TABLET | Refills: 2 | Status: SHIPPED | OUTPATIENT
Start: 2023-06-09 | End: 2023-08-07 | Stop reason: SDUPTHER

## 2023-06-09 NOTE — PROGRESS NOTES
"Subjective   Patient ID: Marissa Rebolledo is a 77 y.o. female who presents for Med Refill (Noticed her hair falling out in clumps, thinning. Bad headaches the last two nights, and her BP has been elevated at home. She feels down, sad, and anxious. Says thed thyroid med is the only thing new in her life. ).    HPI   Patient comes in today complaining of multiple symptoms.  She claims that she has been having problems with elevated blood pressure, bad headaches the last few nights and has noticed her hair \"falling out in clumps\" and getting thin.  She also has been feeling down and sad and anxious.  She states these symptoms all began with the start of the thyroid medicine last month.    Review of Systems   All other systems reviewed and are negative.      Objective   /83   Pulse 54   Temp 36.7 °C (98 °F)   Resp 18   Wt 72.1 kg (159 lb)   LMP  (LMP Unknown)   SpO2 98%   BMI 29.08 kg/m²     Physical Exam  Vitals reviewed.   Constitutional:       Appearance: She is well-developed.   HENT:      Head: Normocephalic and atraumatic.      Right Ear: Tympanic membrane, ear canal and external ear normal.      Left Ear: Tympanic membrane, ear canal and external ear normal.      Nose: Nose normal.      Mouth/Throat:      Mouth: Mucous membranes are moist.      Pharynx: Oropharynx is clear.   Eyes:      Extraocular Movements: Extraocular movements intact.      Conjunctiva/sclera: Conjunctivae normal.      Pupils: Pupils are equal, round, and reactive to light.   Cardiovascular:      Rate and Rhythm: Normal rate and regular rhythm.      Heart sounds: Normal heart sounds. No murmur heard.  Pulmonary:      Effort: Pulmonary effort is normal.      Breath sounds: Normal breath sounds. No wheezing.   Abdominal:      General: Abdomen is flat. Bowel sounds are normal.      Palpations: Abdomen is soft.   Musculoskeletal:         General: Normal range of motion.   Skin:     General: Skin is warm and dry.   Neurological:      " General: No focal deficit present.      Mental Status: She is alert and oriented to person, place, and time. Mental status is at baseline.   Psychiatric:         Mood and Affect: Mood normal.         Behavior: Behavior normal.         Thought Content: Thought content normal.         Judgment: Judgment normal.         Assessment/Plan   Problem List Items Addressed This Visit       Hypothyroidism    Relevant Medications    levothyroxine (Synthroid, Levoxyl) 25 mcg tablet   Take new dose of medication as directed.  Continue other medications as directed.  RTC in 3 months for recheck, sooner should problems arise.  Medication list reconciled.  Thank you for visiting today!      Professional services: Some of this note was completed using Dragon voice technology and sometimes the software misinterprets words. This may include unintended errors with respect to translation of words, typographical errors or grammar errors which may not have been identified prior to finalization of the chart note. Please take this into account when reading the note. Thank you.

## 2023-07-12 DIAGNOSIS — I10 HYPERTENSION, UNSPECIFIED TYPE: ICD-10-CM

## 2023-07-12 DIAGNOSIS — F41.9 ANXIETY: ICD-10-CM

## 2023-07-12 DIAGNOSIS — K21.9 GASTROESOPHAGEAL REFLUX DISEASE, UNSPECIFIED WHETHER ESOPHAGITIS PRESENT: ICD-10-CM

## 2023-07-12 RX ORDER — VENLAFAXINE HYDROCHLORIDE 150 MG/1
150 CAPSULE, EXTENDED RELEASE ORAL DAILY
Qty: 90 CAPSULE | Refills: 1 | Status: SHIPPED | OUTPATIENT
Start: 2023-07-12 | End: 2023-12-21 | Stop reason: SDUPTHER

## 2023-07-12 RX ORDER — ESOMEPRAZOLE MAGNESIUM 40 MG/1
40 CAPSULE, DELAYED RELEASE ORAL
Qty: 90 CAPSULE | Refills: 1 | Status: SHIPPED | OUTPATIENT
Start: 2023-07-12 | End: 2023-12-21 | Stop reason: SDUPTHER

## 2023-07-12 RX ORDER — SPIRONOLACTONE 25 MG/1
25 TABLET ORAL DAILY
Qty: 90 TABLET | Refills: 1 | Status: SHIPPED | OUTPATIENT
Start: 2023-07-12 | End: 2023-12-15 | Stop reason: SDUPTHER

## 2023-07-12 RX ORDER — LISINOPRIL 40 MG/1
40 TABLET ORAL DAILY
Qty: 90 TABLET | Refills: 1 | Status: SHIPPED | OUTPATIENT
Start: 2023-07-12 | End: 2024-02-02 | Stop reason: SDUPTHER

## 2023-07-12 NOTE — TELEPHONE ENCOUNTER
Patient requests prescription below    Last Office Visit: 6/9/2023     Requested Prescriptions     Pending Prescriptions Disp Refills    esomeprazole (NexIUM) 40 mg DR capsule 30 capsule 2     Sig: Take 1 capsule (40 mg) by mouth once daily in the morning. Take before meals.    lisinopril 40 mg tablet 90 tablet 0     Sig: Take 1 tablet (40 mg) by mouth once daily.    spironolactone (Aldactone) 25 mg tablet 30 tablet 2     Sig: Take 1 tablet (25 mg) by mouth once daily.    venlafaxine XR (Effexor-XR) 150 mg 24 hr capsule 30 capsule 2     Sig: Take 1 capsule (150 mg) by mouth once daily.

## 2023-07-12 NOTE — TELEPHONE ENCOUNTER
Patient is requesting a refill on her     Nexium 40 mg  Lisinopril 40 mg  Aldactone 25 mg  Effexor 150 mg    Sent to [facility] in [place]    Thank You

## 2023-07-17 DIAGNOSIS — E53.8 B12 DEFICIENCY: ICD-10-CM

## 2023-07-17 NOTE — TELEPHONE ENCOUNTER
Requested Prescriptions     Pending Prescriptions Disp Refills    cyanocobalamin (Vitamin B-12) 1,000 mcg/mL injection 3 mL 0     Sig: Inject 1 mL (1,000 mcg) into the shoulder, thigh, or buttocks every 30 (thirty) days.

## 2023-07-17 NOTE — TELEPHONE ENCOUNTER
Patient is requesting her B-12 injections be sent to [facility] in [place].  Patient can be reached at [phone].    Thank You

## 2023-07-18 RX ORDER — CYANOCOBALAMIN 1000 UG/ML
1000 INJECTION, SOLUTION INTRAMUSCULAR; SUBCUTANEOUS
Qty: 3 ML | Refills: 0 | Status: SHIPPED | OUTPATIENT
Start: 2023-07-18 | End: 2023-11-30 | Stop reason: WASHOUT

## 2023-08-01 ENCOUNTER — OFFICE VISIT (OUTPATIENT)
Dept: PRIMARY CARE | Facility: CLINIC | Age: 78
End: 2023-08-01
Payer: MEDICARE

## 2023-08-01 ENCOUNTER — LAB (OUTPATIENT)
Dept: LAB | Facility: LAB | Age: 78
End: 2023-08-01
Payer: MEDICARE

## 2023-08-01 VITALS
SYSTOLIC BLOOD PRESSURE: 167 MMHG | WEIGHT: 162 LBS | RESPIRATION RATE: 18 BRPM | DIASTOLIC BLOOD PRESSURE: 81 MMHG | HEART RATE: 58 BPM | OXYGEN SATURATION: 98 % | TEMPERATURE: 97.7 F | BODY MASS INDEX: 29.63 KG/M2

## 2023-08-01 DIAGNOSIS — E55.9 VITAMIN D DEFICIENCY: ICD-10-CM

## 2023-08-01 DIAGNOSIS — E53.8 B12 DEFICIENCY: ICD-10-CM

## 2023-08-01 DIAGNOSIS — Z12.11 SCREENING FOR COLON CANCER: ICD-10-CM

## 2023-08-01 DIAGNOSIS — E11.9 TYPE 2 DIABETES MELLITUS WITHOUT COMPLICATION, WITHOUT LONG-TERM CURRENT USE OF INSULIN (MULTI): ICD-10-CM

## 2023-08-01 DIAGNOSIS — E03.9 HYPOTHYROIDISM, UNSPECIFIED TYPE: ICD-10-CM

## 2023-08-01 DIAGNOSIS — I10 HYPERTENSION, UNSPECIFIED TYPE: Primary | ICD-10-CM

## 2023-08-01 DIAGNOSIS — I10 HYPERTENSION, UNSPECIFIED TYPE: ICD-10-CM

## 2023-08-01 LAB
ALANINE AMINOTRANSFERASE (SGPT) (U/L) IN SER/PLAS: 13 U/L (ref 7–45)
ALBUMIN (G/DL) IN SER/PLAS: 4 G/DL (ref 3.4–5)
ALKALINE PHOSPHATASE (U/L) IN SER/PLAS: 57 U/L (ref 33–136)
ANION GAP IN SER/PLAS: 13 MMOL/L (ref 10–20)
ASPARTATE AMINOTRANSFERASE (SGOT) (U/L) IN SER/PLAS: 18 U/L (ref 9–39)
BILIRUBIN TOTAL (MG/DL) IN SER/PLAS: 0.4 MG/DL (ref 0–1.2)
CALCIDIOL (25 OH VITAMIN D3) (NG/ML) IN SER/PLAS: 35 NG/ML
CALCIUM (MG/DL) IN SER/PLAS: 9.2 MG/DL (ref 8.6–10.3)
CARBON DIOXIDE, TOTAL (MMOL/L) IN SER/PLAS: 30 MMOL/L (ref 21–32)
CHLORIDE (MMOL/L) IN SER/PLAS: 101 MMOL/L (ref 98–107)
COBALAMIN (VITAMIN B12) (PG/ML) IN SER/PLAS: 388 PG/ML (ref 211–911)
CREATININE (MG/DL) IN SER/PLAS: 0.94 MG/DL (ref 0.5–1.05)
ESTIMATED AVERAGE GLUCOSE FOR HBA1C: 160 MG/DL
GFR FEMALE: 63 ML/MIN/1.73M2
GLUCOSE (MG/DL) IN SER/PLAS: 140 MG/DL (ref 74–99)
HEMOGLOBIN A1C/HEMOGLOBIN TOTAL IN BLOOD: 7.2 %
POTASSIUM (MMOL/L) IN SER/PLAS: 4.3 MMOL/L (ref 3.5–5.3)
PROTEIN TOTAL: 6.7 G/DL (ref 6.4–8.2)
SODIUM (MMOL/L) IN SER/PLAS: 140 MMOL/L (ref 136–145)
THYROTROPIN (MIU/L) IN SER/PLAS BY DETECTION LIMIT <= 0.05 MIU/L: 4.85 MIU/L (ref 0.44–3.98)
THYROXINE (T4) FREE (NG/DL) IN SER/PLAS: 0.7 NG/DL (ref 0.61–1.12)
UREA NITROGEN (MG/DL) IN SER/PLAS: 9 MG/DL (ref 6–23)

## 2023-08-01 PROCEDURE — 36415 COLL VENOUS BLD VENIPUNCTURE: CPT

## 2023-08-01 PROCEDURE — 80053 COMPREHEN METABOLIC PANEL: CPT

## 2023-08-01 PROCEDURE — 83036 HEMOGLOBIN GLYCOSYLATED A1C: CPT

## 2023-08-01 PROCEDURE — 82607 VITAMIN B-12: CPT

## 2023-08-01 PROCEDURE — 84443 ASSAY THYROID STIM HORMONE: CPT

## 2023-08-01 PROCEDURE — 99214 OFFICE O/P EST MOD 30 MIN: CPT | Performed by: FAMILY MEDICINE

## 2023-08-01 PROCEDURE — 82306 VITAMIN D 25 HYDROXY: CPT

## 2023-08-01 PROCEDURE — 84439 ASSAY OF FREE THYROXINE: CPT

## 2023-08-01 NOTE — PROGRESS NOTES
Subjective   Patient ID: Marissa Rebolledo is a 77 y.o. female who presents for Med Refill and Rectal Bleeding (Has only happened a few times before, but 2 days had diarrhea with blood. Did have hemmorrhoid problems a few yrs ago. ).    HPI   Patient presents today with a number of complaints.  She first of all complains that she has had some blood in her stool in the past few days. she is she is on Eliquis 5 mg twice a day for her heart.  She claims her heart issues have been stable.    She claims her blood sugars have been elevated    She claims her blood pressure has been elevated and she brings with her a log of the last 2 weeks and her blood pressures have been averaging 132-158/81-96.  She also claims she gets frequent charley horses in her left leg.  She currently is on metoprolol succinate 200 mg a day along with lisinopril 40 mg and Aldactone 25 mg a day.    Review of Systems   All other systems reviewed and are negative.      Objective   /81   Pulse 58   Temp 36.5 °C (97.7 °F)   Resp 18   Wt 73.5 kg (162 lb)   LMP  (LMP Unknown)   SpO2 98%   BMI 29.63 kg/m²     Physical Exam  Vitals reviewed.   Constitutional:       Appearance: She is well-developed.   HENT:      Head: Normocephalic and atraumatic.      Right Ear: Tympanic membrane, ear canal and external ear normal.      Left Ear: Tympanic membrane, ear canal and external ear normal.      Nose: Nose normal.      Mouth/Throat:      Mouth: Mucous membranes are moist.      Pharynx: Oropharynx is clear.   Eyes:      Extraocular Movements: Extraocular movements intact.      Conjunctiva/sclera: Conjunctivae normal.      Pupils: Pupils are equal, round, and reactive to light.   Cardiovascular:      Rate and Rhythm: Normal rate and regular rhythm.      Heart sounds: Normal heart sounds. No murmur heard.  Pulmonary:      Effort: Pulmonary effort is normal.      Breath sounds: Normal breath sounds. No wheezing.   Abdominal:      General: Abdomen is  flat. Bowel sounds are normal.      Palpations: Abdomen is soft.   Musculoskeletal:         General: Normal range of motion.   Skin:     General: Skin is warm and dry.   Neurological:      General: No focal deficit present.      Mental Status: She is alert and oriented to person, place, and time. Mental status is at baseline.   Psychiatric:         Mood and Affect: Mood normal.         Behavior: Behavior normal.         Thought Content: Thought content normal.         Judgment: Judgment normal.         Assessment/Plan   Problem List Items Addressed This Visit       B12 deficiency    Relevant Orders    Vitamin B12    Diabetes mellitus, type 2 (CMS/HCC)    Relevant Orders    Hemoglobin A1C    Hypertension - Primary    Relevant Orders    Comprehensive Metabolic Panel    Vitamin D deficiency    Relevant Orders    Vitamin D, Total    Hypothyroidism    Relevant Orders    TSH with reflex to Free T4 if abnormal     Other Visit Diagnoses       Screening for colon cancer        Relevant Orders    Colonoscopy        Patient to follow-up with Dr. Chris Wall for colonoscopy.  Will contact patient with lab results when available.  Continue all current meds.  RTC in one month for recheck, sooner should problems arise.  Medication list reconciled.  Thank you for visiting today!      Professional services: Some of this note was completed using Dragon voice technology and sometimes the software misinterprets words. This may include unintended errors with respect to translation of words, typographical errors or grammar errors which may not have been identified prior to finalization of the chart note. Please take this into account when reading the note. Thank you.

## 2023-08-07 DIAGNOSIS — I10 HYPERTENSION, UNSPECIFIED TYPE: ICD-10-CM

## 2023-08-07 DIAGNOSIS — E11.9 TYPE 2 DIABETES MELLITUS WITHOUT COMPLICATION, WITHOUT LONG-TERM CURRENT USE OF INSULIN (MULTI): Primary | ICD-10-CM

## 2023-08-07 DIAGNOSIS — E03.9 HYPOTHYROIDISM, UNSPECIFIED TYPE: ICD-10-CM

## 2023-08-07 RX ORDER — LEVOTHYROXINE SODIUM 50 UG/1
50 TABLET ORAL DAILY
Qty: 90 TABLET | Refills: 1 | Status: SHIPPED | OUTPATIENT
Start: 2023-08-07 | End: 2023-12-21 | Stop reason: SDUPTHER

## 2023-08-07 RX ORDER — GLIPIZIDE 5 MG/1
5 TABLET ORAL
Qty: 180 TABLET | Refills: 1 | Status: SHIPPED | OUTPATIENT
Start: 2023-08-07 | End: 2023-12-21 | Stop reason: SDUPTHER

## 2023-08-07 NOTE — TELEPHONE ENCOUNTER
[facility] called stating that they had to give her an emergency supply of her Metoprolol.  [facility] is asking if we can please send over a new prescription to them? [facility] can be reached at [phone].    Thank You

## 2023-08-07 NOTE — TELEPHONE ENCOUNTER
Requested Prescriptions     Pending Prescriptions Disp Refills    metoprolol succinate XL (Toprol-XL) 100 mg 24 hr tablet 180 tablet 1     Sig: Take 1 tablet (100 mg) by mouth 2 times a day.

## 2023-08-08 RX ORDER — METOPROLOL SUCCINATE 100 MG/1
100 TABLET, EXTENDED RELEASE ORAL 2 TIMES DAILY
Qty: 180 TABLET | Refills: 1 | Status: SHIPPED | OUTPATIENT
Start: 2023-08-08 | End: 2023-10-17 | Stop reason: DRUGHIGH

## 2023-09-14 PROBLEM — I48.0 PAROXYSMAL ATRIAL FIBRILLATION (MULTI): Status: ACTIVE | Noted: 2022-11-08

## 2023-09-14 PROBLEM — I34.0 NONRHEUMATIC MITRAL (VALVE) INSUFFICIENCY: Status: ACTIVE | Noted: 2022-11-08

## 2023-09-14 PROBLEM — C18.0 ADENOCARCINOMA OF CECUM (MULTI): Status: ACTIVE | Noted: 2023-09-14

## 2023-09-14 PROBLEM — Z79.84 LONG TERM (CURRENT) USE OF ORAL HYPOGLYCEMIC DRUGS: Status: ACTIVE | Noted: 2022-11-08

## 2023-09-14 PROBLEM — E11.9 TYPE 2 DIABETES MELLITUS WITHOUT COMPLICATIONS (MULTI): Status: ACTIVE | Noted: 2022-11-08

## 2023-09-14 PROBLEM — I11.0 HYPERTENSIVE HEART DISEASE WITH HEART FAILURE (MULTI): Status: ACTIVE | Noted: 2022-11-08

## 2023-09-14 PROBLEM — I50.22 CHRONIC SYSTOLIC (CONGESTIVE) HEART FAILURE (MULTI): Status: ACTIVE | Noted: 2022-11-08

## 2023-09-14 PROBLEM — R63.4 WEIGHT LOSS: Status: ACTIVE | Noted: 2023-09-14

## 2023-09-14 PROBLEM — Q80.9 XERODERMA: Status: ACTIVE | Noted: 2023-09-14

## 2023-09-14 PROBLEM — Z79.4 LONG TERM (CURRENT) USE OF INSULIN (MULTI): Status: ACTIVE | Noted: 2022-11-08

## 2023-09-14 PROBLEM — C18.9 MALIGNANT NEOPLASM OF COLON (MULTI): Status: ACTIVE | Noted: 2023-09-14

## 2023-09-14 RX ORDER — PROPRANOLOL HYDROCHLORIDE 160 MG/1
CAPSULE, EXTENDED RELEASE ORAL
COMMUNITY
End: 2023-10-17 | Stop reason: ALTCHOICE

## 2023-09-14 RX ORDER — ALBUTEROL SULFATE 90 UG/1
1-2 AEROSOL, METERED RESPIRATORY (INHALATION) EVERY 6 HOURS PRN
COMMUNITY
Start: 2021-12-06 | End: 2023-12-15 | Stop reason: SDUPTHER

## 2023-09-14 RX ORDER — NITROFURANTOIN 25; 75 MG/1; MG/1
100 CAPSULE ORAL EVERY 12 HOURS
COMMUNITY
Start: 2022-11-06 | End: 2023-11-30 | Stop reason: ALTCHOICE

## 2023-09-14 RX ORDER — AZELASTINE 1 MG/ML
1 SPRAY, METERED NASAL 2 TIMES DAILY
COMMUNITY
Start: 2021-12-06

## 2023-09-14 RX ORDER — GLYCERIN/MIN OIL/POLYCARBOPHIL
1 GEL WITH APPLICATOR (GRAM) VAGINAL AS NEEDED
COMMUNITY
Start: 2021-12-06 | End: 2024-03-05 | Stop reason: WASHOUT

## 2023-09-14 RX ORDER — LISINOPRIL 5 MG/1
5 TABLET ORAL DAILY
COMMUNITY
Start: 2022-12-21 | End: 2023-10-17 | Stop reason: DRUGHIGH

## 2023-09-14 RX ORDER — AMIODARONE HYDROCHLORIDE 200 MG/1
200 TABLET ORAL DAILY
COMMUNITY
End: 2023-10-17 | Stop reason: ALTCHOICE

## 2023-09-14 RX ORDER — METFORMIN HYDROCHLORIDE 500 MG/1
500 TABLET ORAL 2 TIMES DAILY
COMMUNITY
End: 2023-10-17 | Stop reason: ALTCHOICE

## 2023-09-14 RX ORDER — DILTIAZEM HYDROCHLORIDE 120 MG/1
120 CAPSULE, EXTENDED RELEASE ORAL DAILY
COMMUNITY
End: 2023-10-17 | Stop reason: ALTCHOICE

## 2023-09-14 RX ORDER — AMPICILLIN 500 MG/1
500 CAPSULE ORAL 2 TIMES DAILY
COMMUNITY
End: 2023-11-30 | Stop reason: ALTCHOICE

## 2023-09-14 RX ORDER — INSULIN LISPRO 100 [IU]/ML
INJECTION, SOLUTION SUBCUTANEOUS 3 TIMES DAILY
COMMUNITY
Start: 2022-11-07 | End: 2024-03-15 | Stop reason: ALTCHOICE

## 2023-09-14 RX ORDER — LISINOPRIL 20 MG/1
1 TABLET ORAL DAILY
COMMUNITY
Start: 2022-09-30 | End: 2023-10-17 | Stop reason: DRUGHIGH

## 2023-09-14 RX ORDER — LISINOPRIL 10 MG/1
10 TABLET ORAL DAILY
COMMUNITY
Start: 2022-11-07 | End: 2023-10-17 | Stop reason: DRUGHIGH

## 2023-09-24 DIAGNOSIS — H00.019 HORDEOLUM EXTERNUM, UNSPECIFIED LATERALITY: Primary | ICD-10-CM

## 2023-09-24 RX ORDER — BACITRACIN ZINC AND POLYMYXIN B SULFATE 500; 10000 [USP'U]/G; [USP'U]/G
OINTMENT OPHTHALMIC EVERY 4 HOURS
Qty: 3.5 G | Refills: 0 | Status: SHIPPED | OUTPATIENT
Start: 2023-09-24 | End: 2023-10-01

## 2023-09-24 NOTE — PROGRESS NOTES
Subjective   Patient ID: Marissa Rebolledo is a 77 y.o. female who presents for No chief complaint on file..    HPI     Review of Systems    Objective   LMP  (LMP Unknown)     Physical Exam    Assessment/Plan   Problem List Items Addressed This Visit    None  Visit Diagnoses       Hordeolum externum, unspecified laterality    -  Primary    Relevant Medications    bacitracin-polymyxin B (Polysporin) ophthalmic ointment          Patient was seen at urgent care for a stye on her eye.  They gave her mupirocin but she was afraid to get in her eye.  They switched it to a medication that was over $100.  She was concerned about pain in this.  Due to her allergies I ended up sending in bacitracin polymyxin be ophthalmic ointment.  She will go to the ER if it worsens she will follow-up with her PCP this week.  She is aware of the side effects of medication

## 2023-09-27 DIAGNOSIS — I48.91 ATRIAL FIBRILLATION, UNSPECIFIED TYPE (MULTI): ICD-10-CM

## 2023-09-27 NOTE — TELEPHONE ENCOUNTER
Patient is requesting a refill on her    Eliquis 5 mg       Sent to [facility] in [place]      Thank You

## 2023-09-27 NOTE — TELEPHONE ENCOUNTER
Requested Prescriptions     Pending Prescriptions Disp Refills    apixaban (Eliquis) 5 mg tablet       Sig: Take 1 tablet (5 mg) by mouth once daily.

## 2023-10-04 ENCOUNTER — TELEPHONE (OUTPATIENT)
Dept: PRIMARY CARE | Facility: CLINIC | Age: 78
End: 2023-10-04
Payer: MEDICARE

## 2023-10-04 DIAGNOSIS — I48.91 ATRIAL FIBRILLATION, UNSPECIFIED TYPE (MULTI): ICD-10-CM

## 2023-10-04 NOTE — TELEPHONE ENCOUNTER
Please review and sign corrected Rx.   Requested Prescriptions     Pending Prescriptions Disp Refills    apixaban (Eliquis) 5 mg tablet 180 tablet 1     Sig: Take 1 tablet (5 mg) by mouth 2 times a day.

## 2023-10-04 NOTE — TELEPHONE ENCOUNTER
Patient is calling and stating that she is having some confusion about her Eliquis prescription.  Patient states that she was taking two pills daily and the recent prescription states to take it once daily?  Patient is asking why the change in her dosage?  Patient can be reached at [phone].      Thank You

## 2023-10-12 DIAGNOSIS — E78.5 HYPERLIPIDEMIA, UNSPECIFIED HYPERLIPIDEMIA TYPE: ICD-10-CM

## 2023-10-12 NOTE — TELEPHONE ENCOUNTER
Patient is requesting a refill on her       Atorvastatin 20 mg    Sent to [facility] in [place]    Thank You     Patient last seen 8/1/23

## 2023-10-13 RX ORDER — ATORVASTATIN CALCIUM 20 MG/1
20 TABLET, FILM COATED ORAL DAILY
Qty: 90 TABLET | Refills: 1 | Status: SHIPPED | OUTPATIENT
Start: 2023-10-13 | End: 2024-04-03 | Stop reason: SDUPTHER

## 2023-10-17 ENCOUNTER — OFFICE VISIT (OUTPATIENT)
Dept: CARDIOLOGY | Facility: CLINIC | Age: 78
End: 2023-10-17
Payer: MEDICARE

## 2023-10-17 VITALS
BODY MASS INDEX: 30.18 KG/M2 | RESPIRATION RATE: 16 BRPM | SYSTOLIC BLOOD PRESSURE: 154 MMHG | OXYGEN SATURATION: 94 % | DIASTOLIC BLOOD PRESSURE: 80 MMHG | HEART RATE: 119 BPM | WEIGHT: 165 LBS

## 2023-10-17 DIAGNOSIS — I10 HYPERTENSION, UNSPECIFIED TYPE: ICD-10-CM

## 2023-10-17 DIAGNOSIS — I42.9 CARDIOMYOPATHY, UNSPECIFIED TYPE (MULTI): ICD-10-CM

## 2023-10-17 DIAGNOSIS — I48.0 PAROXYSMAL ATRIAL FIBRILLATION (MULTI): Primary | ICD-10-CM

## 2023-10-17 PROBLEM — I48.91 AFIB (MULTI): Status: RESOLVED | Noted: 2023-03-10 | Resolved: 2023-10-17

## 2023-10-17 PROCEDURE — 3077F SYST BP >= 140 MM HG: CPT | Performed by: STUDENT IN AN ORGANIZED HEALTH CARE EDUCATION/TRAINING PROGRAM

## 2023-10-17 PROCEDURE — 99214 OFFICE O/P EST MOD 30 MIN: CPT | Performed by: STUDENT IN AN ORGANIZED HEALTH CARE EDUCATION/TRAINING PROGRAM

## 2023-10-17 PROCEDURE — 3079F DIAST BP 80-89 MM HG: CPT | Performed by: STUDENT IN AN ORGANIZED HEALTH CARE EDUCATION/TRAINING PROGRAM

## 2023-10-17 PROCEDURE — 1036F TOBACCO NON-USER: CPT | Performed by: STUDENT IN AN ORGANIZED HEALTH CARE EDUCATION/TRAINING PROGRAM

## 2023-10-17 PROCEDURE — 1159F MED LIST DOCD IN RCRD: CPT | Performed by: STUDENT IN AN ORGANIZED HEALTH CARE EDUCATION/TRAINING PROGRAM

## 2023-10-17 PROCEDURE — 93000 ELECTROCARDIOGRAM COMPLETE: CPT | Performed by: STUDENT IN AN ORGANIZED HEALTH CARE EDUCATION/TRAINING PROGRAM

## 2023-10-17 PROCEDURE — 1160F RVW MEDS BY RX/DR IN RCRD: CPT | Performed by: STUDENT IN AN ORGANIZED HEALTH CARE EDUCATION/TRAINING PROGRAM

## 2023-10-17 RX ORDER — METOPROLOL SUCCINATE 100 MG/1
100 TABLET, EXTENDED RELEASE ORAL DAILY
Qty: 90 TABLET | Refills: 1 | Status: SHIPPED | OUTPATIENT
Start: 2023-10-17 | End: 2024-04-12 | Stop reason: SDUPTHER

## 2023-10-17 NOTE — PROGRESS NOTES
"Cardiac Electrophysiology Office Visit    Referred by Hank Duong MD for   Chief Complaint   Patient presents with    Follow-up        HPI:  Marissa Rebolledo is a 77 y.o. year old female patient with h/o COVID (Dec 2021 - Hospitalization), New DX of DM, CHF (HFrEF - 40%), atrial fibrillation  presenting today for follow up    Pt reports that she has been doing well, denies any CP, SOP, palpitations, dizziness or LOC. No AF recurrence as far as pt knows.    PMHx/PSHx: As above  Tobacco quit 40 years - 1ppd for 10 years , alcohol - denies, caffeine use 1-2 cups/coffee day , drug use - denies  FamHx: Father \" Heart disease\"; Mother - D@92, no heart disease, Sister - Recent Dx of CHF, Brother - PPM; Son - D@50 2/2 Throat CA.      Objective  Allergies   Allergen Reactions    Metformin Headache and GI Upset     Abdominal pain    Mirtazapine Hives    Rosuvastatin Myalgia     Muscle spasm/weakness    Simvastatin Myalgia     Muscle spasms/weakness  Leg cramps     Sulfamethoxazole-Trimethoprim Hives    Alendronic Acid Other    Clarithromycin Other     weak    Fosamax [Alendronate] Other     weak    Nitrofurantoin Monohyd/M-Cryst Other     weak    Ofloxacin Other     weakness        Current Outpatient Medications   Medication Instructions    albuterol 90 mcg/actuation inhaler 1-2 puffs, inhalation, Every 6 hours PRN    ampicillin (PRINCIPEN) 500 mg, oral, 2 times daily    apixaban (ELIQUIS) 5 mg, oral, 2 times daily    atorvastatin (LIPITOR) 20 mg, oral, Daily    azelastine (Astelin) 137 mcg (0.1 %) nasal spray 1 spray, Does not apply, 2 times daily    blood-glucose sensor (Dexcom G6 Sensor) device 1 Device, miscellaneous, Every 14 days, CHANGE Q 14 DAYS    CALCIUM CARBONATE ORAL 500 mg, oral, Every 2 hour PRN    cyanocobalamin (VITAMIN B-12) 1,000 mcg, intramuscular, Every 30 days    Dexcom G4 platinum  (Dexcom ) misc 1 Device, subcutaneous, As needed, Use as instructed<BR>CHANGE YEARLY    Dexcom G4 " platinum transmitter (Dexcom G6 Transmitter) device 1 each, subcutaneous, As needed, Use as instructed<BR>CHANGE EVERY 3 MONTHS    empagliflozin (Jardiance) 10 mg 1 tablet, oral, Daily    esomeprazole (NEXIUM) 40 mg, oral, Daily before breakfast    glipiZIDE (GLUCOTROL) 5 mg, oral, 2 times daily before meals    glycerin-min oil-polycarbophil (Replens) gel 1 Application, vaginal, As needed    insulin lispro (HumaLOG Rufino KwikPen U-100) 100 unit/mL injection 3 times daily    insulin lispro (HumaLOG) 100 unit/mL injection subcutaneous, See admin instructions, Sliding scale (2) - verify which one patient is following  <BR>See med note    levothyroxine (SYNTHROID, LEVOXYL) 50 mcg, oral, Daily    lisinopril 40 mg, oral, Daily    metFORMIN XR (Glucophage-XR) 500 mg 24 hr tablet 2 tablets, oral, 2 times daily with meals    metoprolol succinate XL (TOPROL-XL) 100 mg, oral, Daily    nitrofurantoin, macrocrystal-monohydrate, (Macrobid) 100 mg capsule 100 mg, oral, Every 12 hours    spironolactone (ALDACTONE) 25 mg, oral, Daily    venlafaxine XR (EFFEXOR-XR) 150 mg, oral, Daily         Visit Vitals  /80 (BP Location: Right arm, Patient Position: Sitting)   Pulse (!) 119 Comment: pt aware   Resp 16   Wt 74.8 kg (165 lb)   LMP  (LMP Unknown)   SpO2 94%   BMI 30.18 kg/m²   OB Status Postmenopausal   Smoking Status Former   BSA 1.81 m²        Physical Exam  Vitals reviewed.   Constitutional:       Appearance: Normal appearance.   HENT:      Head: Normocephalic.   Cardiovascular:      Rate and Rhythm: Regular rhythm. Bradycardia present.   Pulmonary:      Effort: Pulmonary effort is normal. No respiratory distress.      Breath sounds: No wheezing.   Skin:     General: Skin is warm and dry.      Capillary Refill: Capillary refill takes less than 2 seconds.   Neurological:      Mental Status: She is alert.   Psychiatric:         Mood and Affect: Mood normal.           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   - c/w GDMT - Lisinopril, BB, Spironolactone- Will f/u with Cardiology    #Sinus bradycardia  - Pt jones snot reports increased fatigue, dizziness or LOC  - Decrease Metoprolol XL to 100mg Daily to help with bradycardia    #HTN  - c/w Lisinopril 40mg Daily  - c/w Spironolactone 25mg Daily  - If BP still elevated may need to consider addition of HCTZ for BP control    Return to Clinic: Patient should return to the EP Clinic in 6 months     Hank Tucker MD [facility]  Cardiac Electrophysiology  [email or web address]    **Disclaimer: This note was dictated by speech recognition, and every effort has been made to prevent any error in transcription, however minor errors may be present**

## 2023-11-08 PROBLEM — H00.019 EXTERNAL HORDEOLUM: Status: ACTIVE | Noted: 2023-09-22

## 2023-11-10 ENCOUNTER — APPOINTMENT (OUTPATIENT)
Dept: CARDIOLOGY | Facility: CLINIC | Age: 78
End: 2023-11-10
Payer: MEDICARE

## 2023-11-15 ENCOUNTER — TELEPHONE (OUTPATIENT)
Dept: PRIMARY CARE | Facility: CLINIC | Age: 78
End: 2023-11-15
Payer: MEDICARE

## 2023-11-15 DIAGNOSIS — E55.9 VITAMIN D DEFICIENCY: ICD-10-CM

## 2023-11-15 DIAGNOSIS — E11.9 TYPE 2 DIABETES MELLITUS WITHOUT COMPLICATION, WITHOUT LONG-TERM CURRENT USE OF INSULIN (MULTI): ICD-10-CM

## 2023-11-15 DIAGNOSIS — I50.22 CHRONIC SYSTOLIC (CONGESTIVE) HEART FAILURE (MULTI): Primary | ICD-10-CM

## 2023-11-15 DIAGNOSIS — E53.8 B12 DEFICIENCY: ICD-10-CM

## 2023-11-15 DIAGNOSIS — E03.9 HYPOTHYROIDISM, UNSPECIFIED TYPE: ICD-10-CM

## 2023-11-15 DIAGNOSIS — I50.20 SYSTOLIC CONGESTIVE HEART FAILURE, UNSPECIFIED HF CHRONICITY (MULTI): Primary | ICD-10-CM

## 2023-11-15 NOTE — TELEPHONE ENCOUNTER
Patient has completed her series of B-12 injections and is asking for an order to be put in her chart so that she can have the levels tested again.  Patient can be reached at [phone].      Thank You

## 2023-11-22 ENCOUNTER — LAB (OUTPATIENT)
Dept: LAB | Facility: LAB | Age: 78
End: 2023-11-22
Payer: MEDICARE

## 2023-11-22 DIAGNOSIS — E11.9 TYPE 2 DIABETES MELLITUS WITHOUT COMPLICATION, WITHOUT LONG-TERM CURRENT USE OF INSULIN (MULTI): ICD-10-CM

## 2023-11-22 DIAGNOSIS — E03.9 HYPOTHYROIDISM, UNSPECIFIED TYPE: ICD-10-CM

## 2023-11-22 DIAGNOSIS — E53.8 B12 DEFICIENCY: ICD-10-CM

## 2023-11-22 DIAGNOSIS — E55.9 VITAMIN D DEFICIENCY: ICD-10-CM

## 2023-11-22 LAB
25(OH)D3 SERPL-MCNC: 33 NG/ML (ref 30–100)
EST. AVERAGE GLUCOSE BLD GHB EST-MCNC: 183 MG/DL
HBA1C MFR BLD: 8 %
TSH SERPL-ACNC: 1.81 MIU/L (ref 0.44–3.98)
VIT B12 SERPL-MCNC: 415 PG/ML (ref 211–911)

## 2023-11-22 PROCEDURE — 84443 ASSAY THYROID STIM HORMONE: CPT

## 2023-11-22 PROCEDURE — 36415 COLL VENOUS BLD VENIPUNCTURE: CPT

## 2023-11-22 PROCEDURE — 82607 VITAMIN B-12: CPT

## 2023-11-22 PROCEDURE — 83036 HEMOGLOBIN GLYCOSYLATED A1C: CPT

## 2023-11-22 PROCEDURE — 82306 VITAMIN D 25 HYDROXY: CPT

## 2023-11-28 ENCOUNTER — TELEPHONE (OUTPATIENT)
Dept: PRIMARY CARE | Facility: CLINIC | Age: 78
End: 2023-11-28
Payer: MEDICARE

## 2023-11-28 NOTE — TELEPHONE ENCOUNTER
----- Message from CELIA Philip sent at 11/24/2023  5:47 PM EST -----  Please advise patient that her labs are normal except for an increase in her A1C, indicating worsening blood sugar control. I will continue the current diabetic medications and have her address this with Dr. Contreras at her appointment in December.

## 2023-11-30 ENCOUNTER — OFFICE VISIT (OUTPATIENT)
Dept: CARDIOLOGY | Facility: CLINIC | Age: 78
End: 2023-11-30
Payer: MEDICARE

## 2023-11-30 VITALS
DIASTOLIC BLOOD PRESSURE: 70 MMHG | HEART RATE: 78 BPM | WEIGHT: 165 LBS | OXYGEN SATURATION: 96 % | BODY MASS INDEX: 30.18 KG/M2 | SYSTOLIC BLOOD PRESSURE: 128 MMHG | RESPIRATION RATE: 16 BRPM

## 2023-11-30 DIAGNOSIS — E78.5 HYPERLIPIDEMIA, UNSPECIFIED HYPERLIPIDEMIA TYPE: ICD-10-CM

## 2023-11-30 DIAGNOSIS — I48.0 PAROXYSMAL ATRIAL FIBRILLATION (MULTI): ICD-10-CM

## 2023-11-30 DIAGNOSIS — I50.22 CHRONIC SYSTOLIC (CONGESTIVE) HEART FAILURE (MULTI): Primary | ICD-10-CM

## 2023-11-30 DIAGNOSIS — I10 PRIMARY HYPERTENSION: ICD-10-CM

## 2023-11-30 PROBLEM — C18.9 MALIGNANT NEOPLASM OF COLON (MULTI): Status: RESOLVED | Noted: 2023-09-14 | Resolved: 2023-11-30

## 2023-11-30 PROBLEM — R05.9 COUGH: Status: RESOLVED | Noted: 2023-03-10 | Resolved: 2023-11-30

## 2023-11-30 PROBLEM — B37.31 YEAST VAGINITIS: Status: RESOLVED | Noted: 2023-03-10 | Resolved: 2023-11-30

## 2023-11-30 PROBLEM — L30.9 DERMATITIS: Status: RESOLVED | Noted: 2023-03-10 | Resolved: 2023-11-30

## 2023-11-30 PROBLEM — N39.0 URINARY TRACT INFECTION: Status: RESOLVED | Noted: 2023-03-10 | Resolved: 2023-11-30

## 2023-11-30 PROBLEM — L85.3 DRY SKIN: Status: RESOLVED | Noted: 2023-03-10 | Resolved: 2023-11-30

## 2023-11-30 PROBLEM — M79.10 MUSCULAR ACHES: Status: RESOLVED | Noted: 2023-03-10 | Resolved: 2023-11-30

## 2023-11-30 PROBLEM — K64.9 BLEEDING HEMORRHOIDS: Status: RESOLVED | Noted: 2023-03-10 | Resolved: 2023-11-30

## 2023-11-30 PROBLEM — R51.9 HEADACHE: Status: RESOLVED | Noted: 2023-03-10 | Resolved: 2023-11-30

## 2023-11-30 PROBLEM — K59.00 CONSTIPATION: Status: RESOLVED | Noted: 2023-03-10 | Resolved: 2023-11-30

## 2023-11-30 PROBLEM — C18.0 ADENOCARCINOMA OF CECUM (MULTI): Status: RESOLVED | Noted: 2023-09-14 | Resolved: 2023-11-30

## 2023-11-30 PROCEDURE — 3074F SYST BP LT 130 MM HG: CPT | Performed by: STUDENT IN AN ORGANIZED HEALTH CARE EDUCATION/TRAINING PROGRAM

## 2023-11-30 PROCEDURE — 3078F DIAST BP <80 MM HG: CPT | Performed by: STUDENT IN AN ORGANIZED HEALTH CARE EDUCATION/TRAINING PROGRAM

## 2023-11-30 PROCEDURE — 1159F MED LIST DOCD IN RCRD: CPT | Performed by: STUDENT IN AN ORGANIZED HEALTH CARE EDUCATION/TRAINING PROGRAM

## 2023-11-30 PROCEDURE — 1160F RVW MEDS BY RX/DR IN RCRD: CPT | Performed by: STUDENT IN AN ORGANIZED HEALTH CARE EDUCATION/TRAINING PROGRAM

## 2023-11-30 PROCEDURE — 99213 OFFICE O/P EST LOW 20 MIN: CPT | Performed by: STUDENT IN AN ORGANIZED HEALTH CARE EDUCATION/TRAINING PROGRAM

## 2023-11-30 PROCEDURE — 1036F TOBACCO NON-USER: CPT | Performed by: STUDENT IN AN ORGANIZED HEALTH CARE EDUCATION/TRAINING PROGRAM

## 2023-11-30 NOTE — PATIENT INSTRUCTIONS
Your blood pressure is well-controlled, we will continue your current heart medications.     Thank you for your visit today. Please contact our office (via [facility] or phone) with any additional questions.     [facility]    Pam, LYNSEY/Clinic Nurse for:    Dr. Dariel Morejon    [address], [address]  [place] [address]    Phone: [phone] Press Option 5 then Option 3 to speak with the Clinic Nurse (Pam)    _____    To Reach:    Billing Questions -    [phone]  Scheduling / Rescheduling -  Option 1  Refills / Medication Requests -  Option 3  General Office /  -  Option 4  Results -     Option 6  Medical Records -    Option 7  Repeat Options -    Option 9

## 2023-11-30 NOTE — PROGRESS NOTES
[facility] Cardiology Outpatient Follow-up Visit     Reason for Visit: follow-up chronic systolic heart failure; paroxysmal afib.     HPI: Marissa Rebolledo is a 77 y.o.  female who presents today for follow-up chronic systolic heart failure; paroxysmal afib. Past medical history of paroxysmal afib ([id number] 6- On apixaban), cardiomyopathy with mild to moderate LV systolic dysfunction (LVEF 40%), Type II DM, Hx SARS2-COVID (2021- required hospitalization).      Patient presented to cardiology clinic on 2022. No active cardiac complaints at this time. No chest pains. Patient is not always able to tell when she is in afib, but does typically note generalized fatigue during such episodes. She is established with EP and considering possiblity of afib ablation (PVI). Currently tolerating apixaban without significant bleeding issues. No orthopena, PND, syncope.      Recently seen by EP 2023 > sinus bradycardia > metoprolol was decreased. Patient returned to cardiology clinic on 2023.  No active cardiac complaints. Interval improvement in HR and generalized fatigue with down-titration of metoprolol.  Euvolemic on exam.       Past Medical History:   - as above    Surgical History:   She has a past surgical history that includes Colostomy (2013); Knee surgery (2013); and Colectomy (12/10/2013).    Family History:   Family History   Problem Relation Name Age of Onset    Heart disease Mother      Hypertension Mother      Cancer Father      Heart disease Father       - CV disease (father); mother- no heart disease (lived to ), CHF (sister), brother- PPM; son ( age 50 2/2 throat cancer)     Allergies:  Metformin, Mirtazapine, Rosuvastatin, Simvastatin, Sulfamethoxazole-trimethoprim, Alendronic acid, Clarithromycin, Fosamax [alendronate], Nitrofurantoin monohyd/m-cryst, and Ofloxacin     Social History:   - former smoker (Quit 40 years ago; 10 pack year hx); no alcohol use; no  illicit drug use.     Prior Cardiovascular Testing (Personally Reviewed):     TTE (11/2022)- Mild to moderate LV systolic function decrease (LVEF 40%), mild to moderate MR, mildly elevated RVSP, global LV hypokinesis     Review of Systems:  Review of Systems   Constitutional: Negative.   HENT: Negative.     Eyes: Negative.    Cardiovascular:  Negative for chest pain, dyspnea on exertion, near-syncope, orthopnea, palpitations, paroxysmal nocturnal dyspnea and syncope.   Respiratory: Negative.     Endocrine: Negative.    Hematologic/Lymphatic: Negative.    Skin: Negative.    Musculoskeletal: Negative.    Gastrointestinal: Negative.    Genitourinary: Negative.    Neurological: Negative.    Psychiatric/Behavioral: Negative.         Outpatient Medications:    Current Outpatient Medications:     apixaban (Eliquis) 5 mg tablet, Take 1 tablet (5 mg) by mouth 2 times a day., Disp: 180 tablet, Rfl: 1    atorvastatin (Lipitor) 20 mg tablet, Take 1 tablet (20 mg) by mouth once daily., Disp: 90 tablet, Rfl: 1    azelastine (Astelin) 137 mcg (0.1 %) nasal spray, 1 spray by Does not apply route twice a day., Disp: , Rfl:     blood-glucose sensor (Dexcom G6 Sensor) device, 1 Device every 14 (fourteen) days. CHANGE Q 14 DAYS, Disp: , Rfl:     Dexcom G4 platinum  (Dexcom ) misc, Inject 1 Device under the skin if needed. Use as instructed CHANGE YEARLY, Disp: , Rfl:     Dexcom G4 platinum transmitter (Dexcom G6 Transmitter) device, Inject 1 each under the skin if needed. Use as instructed CHANGE EVERY 3 MONTHS, Disp: , Rfl:     esomeprazole (NexIUM) 40 mg DR capsule, Take 1 capsule (40 mg) by mouth once daily in the morning. Take before meals., Disp: 90 capsule, Rfl: 1    glipiZIDE (Glucotrol) 5 mg tablet, Take 1 tablet (5 mg) by mouth 2 times a day before meals., Disp: 180 tablet, Rfl: 1    glycerin-min oil-polycarbophil (Replens) gel, Insert 1 Application into the vagina if needed (three times weekly as needed for  vaginal dryness and irritation)., Disp: , Rfl:     insulin lispro (HumaLOG Rufino KwikPen U-100) 100 unit/mL injection, 3 times a day., Disp: , Rfl:     insulin lispro (HumaLOG) 100 unit/mL injection, Inject under the skin see administration instructions. Sliding scale (2) - verify which one patient is following   See med note, Disp: , Rfl:     levothyroxine (Synthroid, Levoxyl) 50 mcg tablet, Take 1 tablet (50 mcg) by mouth once daily., Disp: 90 tablet, Rfl: 1    lisinopril 40 mg tablet, Take 1 tablet (40 mg) by mouth once daily., Disp: 90 tablet, Rfl: 1    metFORMIN XR (Glucophage-XR) 500 mg 24 hr tablet, Take 2 tablets (1,000 mg) by mouth 2 times a day with meals., Disp: , Rfl:     metoprolol succinate XL (Toprol-XL) 100 mg 24 hr tablet, Take 1 tablet (100 mg) by mouth once daily., Disp: 90 tablet, Rfl: 1    spironolactone (Aldactone) 25 mg tablet, Take 1 tablet (25 mg) by mouth once daily., Disp: 90 tablet, Rfl: 1    venlafaxine XR (Effexor-XR) 150 mg 24 hr capsule, Take 1 capsule (150 mg) by mouth once daily., Disp: 90 capsule, Rfl: 1    albuterol 90 mcg/actuation inhaler, Inhale 1-2 puffs every 6 hours if needed., Disp: , Rfl:     CALCIUM CARBONATE ORAL, Take 500 mg by mouth every 2 hours if needed (antacid)., Disp: , Rfl:     empagliflozin (Jardiance) 10 mg, Take 1 tablet (10 mg) by mouth once daily., Disp: , Rfl:      Last Recorded Vitals  /70 (BP Location: Right arm, Patient Position: Sitting)   Pulse 78   Resp 16   Wt 74.8 kg (165 lb)   LMP  (LMP Unknown)   SpO2 96%   BMI 30.18 kg/m²     Physical Exam:    Physical Exam  Constitutional:       General: She is not in acute distress.  HENT:      Head: Normocephalic.      Mouth/Throat:      Mouth: Mucous membranes are moist.   Eyes:      Extraocular Movements: Extraocular movements intact.      Conjunctiva/sclera: Conjunctivae normal.   Neck:      Vascular: No JVD.   Cardiovascular:      Rate and Rhythm: Normal rate and regular rhythm.      Pulses:  "Normal pulses.   Pulmonary:      Effort: Pulmonary effort is normal. No respiratory distress.      Breath sounds: Normal breath sounds.   Abdominal:      General: There is no distension.   Musculoskeletal:         General: No swelling.   Skin:     General: Skin is warm and dry.   Neurological:      General: No focal deficit present.      Mental Status: She is alert.      Cranial Nerves: No cranial nerve deficit.      Motor: No weakness.   Psychiatric:         Mood and Affect: Mood normal.         Behavior: Behavior normal.         Lab/Radiology/Diagnostic Review:    Labs    Lab Results   Component Value Date    GLUCOSE 140 (H) 08/01/2023    CALCIUM 9.2 08/01/2023     08/01/2023    K 4.3 08/01/2023    CO2 30 08/01/2023     08/01/2023    BUN 9 08/01/2023    CREATININE 0.94 08/01/2023       Lab Results   Component Value Date    WBC 8.5 05/01/2023    HGB 13.2 05/01/2023    HCT 41.7 05/01/2023    MCV 88 05/01/2023     05/01/2023       Lab Results   Component Value Date    CHOL 150 05/01/2023    CHOL 144 02/11/2021    CHOL 144 10/17/2019     Lab Results   Component Value Date    HDL 49.4 05/01/2023    HDL 37.0 (A) 02/11/2021    HDL 37.1 (A) 10/17/2019     No results found for: \"LDLCALC\"  Lab Results   Component Value Date    TRIG 117 05/01/2023    TRIG 168 (H) 02/11/2021    TRIG 162 (H) 10/17/2019       Lab Results   Component Value Date     (H) 11/03/2022       Lab Results   Component Value Date    TSH 1.81 11/22/2023       Assessment:   77 y.o.  female who presents today for follow-up chronic systolic heart failure; paroxysmal afib. Past medical history of paroxysmal afib ([id number] 6- On apixaban), cardiomyopathy with mild to moderate LV systolic dysfunction (LVEF 40%), Type II DM, Hx SARS2-COVID (12/2021- required hospitalization).     Recently seen by EP October 2023 > sinus bradycardia > metoprolol was decreased. Patient returned to cardiology clinic on 11/30/2023.  No active " cardiac complaints. Interval improvement in HR and generalized fatigue with down-titration of metoprolol.  Euvolemic on exam.  HTN is well-controlled.     Overall Plan:  1. atrial fibrillation ([id number] 6)- continue apixaban, per EP continue beta blockade; follow-up with Dr. Tucker from EP     2. 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 DLD (goal LDL < 100 mg/dl)- continue atorvastatin     4. Type II DM- continue metformin; Empagliflozin    Disposition- Return to cardiology clinic in 6-12 months    Thank you for your visit today. Please contact our office with any questions.     Abhilash Vieira MD

## 2023-12-08 ASSESSMENT — ENCOUNTER SYMPTOMS
PND: 0
NEUROLOGICAL NEGATIVE: 1
NEAR-SYNCOPE: 0
GASTROINTESTINAL NEGATIVE: 1
PSYCHIATRIC NEGATIVE: 1
ORTHOPNEA: 0
MUSCULOSKELETAL NEGATIVE: 1
PALPITATIONS: 0
CONSTITUTIONAL NEGATIVE: 1
RESPIRATORY NEGATIVE: 1
DYSPNEA ON EXERTION: 0
ENDOCRINE NEGATIVE: 1
EYES NEGATIVE: 1
SYNCOPE: 0
HEMATOLOGIC/LYMPHATIC NEGATIVE: 1

## 2023-12-12 ENCOUNTER — TELEPHONE (OUTPATIENT)
Dept: PHARMACY | Facility: HOSPITAL | Age: 78
End: 2023-12-12
Payer: MEDICARE

## 2023-12-12 NOTE — TELEPHONE ENCOUNTER
"Patient was contacted 3 times in regards to DOAC clinic referral from Dr. Sabi Pierre . The patient was unable to be reached. Therefore, this referral will be cancelled. If the patient calls back, there referral will be reactivated. If the referring provider would like more attempts to be made, a new referral must be placed.     Contact Attempts: 11/20 (Primary number, LVM), 12/8 (Primary, someone answered and hung up), 12/12 (Primary, LVM)    Referral Information:   Name of Referral: Referral to Clinical Pharmacy  Reasoning: \"Unable to Afford Jardiance, has recv assistance form [employer] in past.\"  Referring Provider: Dr. Sabi Wagner, Pharm D  PGY-1 Pharmacy Resident, [facility]   "

## 2023-12-15 ENCOUNTER — OFFICE VISIT (OUTPATIENT)
Dept: PRIMARY CARE | Facility: CLINIC | Age: 78
End: 2023-12-15
Payer: MEDICARE

## 2023-12-15 VITALS
BODY MASS INDEX: 30.18 KG/M2 | DIASTOLIC BLOOD PRESSURE: 82 MMHG | WEIGHT: 165 LBS | RESPIRATION RATE: 16 BRPM | HEART RATE: 66 BPM | TEMPERATURE: 97.8 F | SYSTOLIC BLOOD PRESSURE: 144 MMHG | OXYGEN SATURATION: 97 %

## 2023-12-15 DIAGNOSIS — Z79.4 LONG TERM (CURRENT) USE OF INSULIN (MULTI): ICD-10-CM

## 2023-12-15 DIAGNOSIS — R53.82 CHRONIC FATIGUE: ICD-10-CM

## 2023-12-15 DIAGNOSIS — Z79.4 TYPE 2 DIABETES MELLITUS WITHOUT COMPLICATION, WITH LONG-TERM CURRENT USE OF INSULIN (MULTI): Primary | ICD-10-CM

## 2023-12-15 DIAGNOSIS — E11.9 TYPE 2 DIABETES MELLITUS WITHOUT COMPLICATION, WITH LONG-TERM CURRENT USE OF INSULIN (MULTI): Primary | ICD-10-CM

## 2023-12-15 DIAGNOSIS — I48.0 PAROXYSMAL ATRIAL FIBRILLATION (MULTI): ICD-10-CM

## 2023-12-15 DIAGNOSIS — I10 HYPERTENSION, UNSPECIFIED TYPE: ICD-10-CM

## 2023-12-15 PROCEDURE — 99214 OFFICE O/P EST MOD 30 MIN: CPT | Performed by: FAMILY MEDICINE

## 2023-12-15 RX ORDER — ALBUTEROL SULFATE 90 UG/1
1-2 AEROSOL, METERED RESPIRATORY (INHALATION) EVERY 6 HOURS PRN
Qty: 18 G | Refills: 0 | Status: SHIPPED | OUTPATIENT
Start: 2023-12-15

## 2023-12-15 RX ORDER — SPIRONOLACTONE 25 MG/1
25 TABLET ORAL DAILY
Qty: 90 TABLET | Refills: 1 | Status: SHIPPED | OUTPATIENT
Start: 2023-12-15 | End: 2024-06-12

## 2023-12-15 RX ORDER — FLASH GLUCOSE SENSOR
KIT MISCELLANEOUS
Qty: 1 EACH | Refills: 0 | Status: SHIPPED | OUTPATIENT
Start: 2023-12-15 | End: 2024-01-26 | Stop reason: SDUPTHER

## 2023-12-15 RX ORDER — FLASH GLUCOSE SCANNING READER
EACH MISCELLANEOUS
Qty: 1 EACH | Refills: 0 | Status: SHIPPED | OUTPATIENT
Start: 2023-12-15 | End: 2024-03-15 | Stop reason: WASHOUT

## 2023-12-15 NOTE — PROGRESS NOTES
Subjective   Patient ID: Marissa Rebolledo is a 78 y.o. female who presents for Follow-up.    HPI   Patient presents today with her daughter for 3-month checkup and refill of meds.  She states that she is taking her medicines as directed and is not having any side effects from them.    She is complaining of constantly feeling tired and drained.  She did see her cardiologist who found her to be bradycardic and did apparently decrease her metoprolol to 1 pill a day now which has seemed to help.  She claims she just has not felt the same though since she had COVID in December 2021.    She feels like her sugars are never under good control.  We had ordered the Dexcom but she was never able to use it because she could not figure out how.  She wonders if there is another 1 that she could try.      She also brings with her today a letter she got in the mail from [employer] requesting a lab order from me for [employer] for a home specimen test kit for the patient.  When questioned further about this the patient states that she has a lot of cancer in her family but she is not sure how this company got her information.  At this point I am not going to order any testing from this company.    8/1/2023  Patient presents today with a number of complaints.  She first of all complains that she has had some blood in her stool in the past few days. she is she is on Eliquis 5 mg twice a day for her heart.  She claims her heart issues have been stable.    She claims her blood sugars have been elevated    She claims her blood pressure has been elevated and she brings with her a log of the last 2 weeks and her blood pressures have been averaging 132-158/81-96.  She also claims she gets frequent charley horses in her left leg.  She currently is on metoprolol succinate 200 mg a day along with lisinopril 40 mg and Aldactone 25 mg a day.    6/9/2023  Patient comes in today complaining of multiple  "symptoms.  She claims that she has been having problems with elevated blood pressure, bad headaches the last few nights and has noticed her hair \"falling out in clumps\" and getting thin.  She also has been feeling down and sad and anxious.  She states these symptoms all began with the start of the thyroid medicine last month.    Review of Systems   All other systems reviewed and are negative.      Objective   /82   Pulse 66   Temp 36.6 °C (97.8 °F)   Resp 16   Wt 74.8 kg (165 lb)   LMP  (LMP Unknown)   SpO2 97%   BMI 30.18 kg/m²     Physical Exam  Constitutional:       Appearance: She is well-developed. She is obese.   HENT:      Head: Normocephalic and atraumatic.      Right Ear: Tympanic membrane, ear canal and external ear normal.      Left Ear: Tympanic membrane, ear canal and external ear normal.      Nose: Nose normal.      Mouth/Throat:      Mouth: Mucous membranes are moist.      Pharynx: Oropharynx is clear.   Eyes:      Extraocular Movements: Extraocular movements intact.      Conjunctiva/sclera: Conjunctivae normal.      Pupils: Pupils are equal, round, and reactive to light.   Cardiovascular:      Rate and Rhythm: Normal rate and regular rhythm.      Heart sounds: Normal heart sounds. No murmur heard.  Pulmonary:      Effort: Pulmonary effort is normal.      Breath sounds: Normal breath sounds. No wheezing.   Abdominal:      General: Abdomen is flat. Bowel sounds are normal.      Palpations: Abdomen is soft.   Musculoskeletal:         General: Normal range of motion.   Skin:     General: Skin is warm and dry.   Neurological:      General: No focal deficit present.      Mental Status: She is alert and oriented to person, place, and time. Mental status is at baseline.   Psychiatric:         Mood and Affect: Mood normal.         Behavior: Behavior normal.         Thought Content: Thought content normal.         Judgment: Judgment normal.         Assessment/Plan   Problem List Items Addressed " This Visit             ICD-10-CM    Diabetes mellitus, type 2 (CMS/[facility]) - Primary E11.9    Relevant Medications    FreeStyle Aneesh reader (FreeStyle Aneesh 2 [place]) misc    FreeStyle Aneesh sensor system (FreeStyle Aneesh 2 Sensor) kit    Fatigue R53.83    Hypertension I10    Relevant Medications    spironolactone (Aldactone) 25 mg tablet    albuterol 90 mcg/actuation inhaler    Long term (current) use of insulin (CMS/[facility]) Z79.4    Paroxysmal atrial fibrillation (CMS/[facility]) I48.0   Continue all current meds.  RTC in 6 weeks for recheck, sooner should problems arise.  Medication list reconciled.  Thank you for visiting today!      Professional services: Some of this note was completed using Dragon voice technology and sometimes the software misinterprets words. This may include unintended errors with respect to translation of words, typographical errors or grammar errors which may not have been identified prior to finalization of the chart note. Please take this into account when reading the note. Thank you.

## 2023-12-21 DIAGNOSIS — F41.9 ANXIETY: ICD-10-CM

## 2023-12-21 DIAGNOSIS — K21.9 GASTROESOPHAGEAL REFLUX DISEASE, UNSPECIFIED WHETHER ESOPHAGITIS PRESENT: ICD-10-CM

## 2023-12-21 DIAGNOSIS — E03.9 HYPOTHYROIDISM, UNSPECIFIED TYPE: ICD-10-CM

## 2023-12-21 DIAGNOSIS — E11.9 TYPE 2 DIABETES MELLITUS WITHOUT COMPLICATION, WITHOUT LONG-TERM CURRENT USE OF INSULIN (MULTI): ICD-10-CM

## 2023-12-21 RX ORDER — VENLAFAXINE HYDROCHLORIDE 150 MG/1
150 CAPSULE, EXTENDED RELEASE ORAL DAILY
Qty: 90 CAPSULE | Refills: 1 | Status: SHIPPED | OUTPATIENT
Start: 2023-12-21 | End: 2024-02-01 | Stop reason: SDUPTHER

## 2023-12-21 RX ORDER — LEVOTHYROXINE SODIUM 50 UG/1
50 TABLET ORAL DAILY
Qty: 90 TABLET | Refills: 1 | Status: SHIPPED | OUTPATIENT
Start: 2023-12-21 | End: 2024-06-18

## 2023-12-21 RX ORDER — ESOMEPRAZOLE MAGNESIUM 40 MG/1
40 CAPSULE, DELAYED RELEASE ORAL
Qty: 90 CAPSULE | Refills: 1 | Status: SHIPPED | OUTPATIENT
Start: 2023-12-21 | End: 2024-06-18

## 2023-12-21 RX ORDER — GLIPIZIDE 5 MG/1
5 TABLET ORAL
Qty: 180 TABLET | Refills: 1 | Status: SHIPPED | OUTPATIENT
Start: 2023-12-21 | End: 2024-06-18

## 2024-01-26 ENCOUNTER — APPOINTMENT (OUTPATIENT)
Dept: PRIMARY CARE | Facility: CLINIC | Age: 79
End: 2024-01-26
Payer: MEDICARE

## 2024-01-26 DIAGNOSIS — E11.9 TYPE 2 DIABETES MELLITUS WITHOUT COMPLICATION, WITHOUT LONG-TERM CURRENT USE OF INSULIN (MULTI): ICD-10-CM

## 2024-01-26 DIAGNOSIS — E11.9 TYPE 2 DIABETES MELLITUS WITHOUT COMPLICATION, WITH LONG-TERM CURRENT USE OF INSULIN (MULTI): ICD-10-CM

## 2024-01-26 DIAGNOSIS — Z79.4 TYPE 2 DIABETES MELLITUS WITHOUT COMPLICATION, WITH LONG-TERM CURRENT USE OF INSULIN (MULTI): ICD-10-CM

## 2024-01-26 RX ORDER — FLASH GLUCOSE SENSOR
KIT MISCELLANEOUS
Qty: 2 EACH | Refills: 3 | Status: SHIPPED | OUTPATIENT
Start: 2024-01-26 | End: 2024-05-30 | Stop reason: SDUPTHER

## 2024-01-26 RX ORDER — FLASH GLUCOSE SCANNING READER
EACH MISCELLANEOUS
Qty: 1 EACH | Refills: 0 | Status: SHIPPED | OUTPATIENT
Start: 2024-01-26

## 2024-01-31 ENCOUNTER — OFFICE VISIT (OUTPATIENT)
Dept: PRIMARY CARE | Facility: CLINIC | Age: 79
End: 2024-01-31
Payer: MEDICARE

## 2024-01-31 ENCOUNTER — LAB (OUTPATIENT)
Dept: LAB | Facility: LAB | Age: 79
End: 2024-01-31
Payer: MEDICARE

## 2024-01-31 VITALS
OXYGEN SATURATION: 96 % | TEMPERATURE: 97.5 F | SYSTOLIC BLOOD PRESSURE: 128 MMHG | BODY MASS INDEX: 30 KG/M2 | RESPIRATION RATE: 20 BRPM | HEART RATE: 68 BPM | DIASTOLIC BLOOD PRESSURE: 78 MMHG | WEIGHT: 164 LBS

## 2024-01-31 DIAGNOSIS — Z79.4 TYPE 2 DIABETES MELLITUS WITHOUT COMPLICATION, WITH LONG-TERM CURRENT USE OF INSULIN (MULTI): Primary | ICD-10-CM

## 2024-01-31 DIAGNOSIS — N18.31 STAGE 3A CHRONIC KIDNEY DISEASE (MULTI): ICD-10-CM

## 2024-01-31 DIAGNOSIS — I48.91 ATRIAL FIBRILLATION, UNSPECIFIED TYPE (MULTI): ICD-10-CM

## 2024-01-31 DIAGNOSIS — I10 BENIGN ESSENTIAL HYPERTENSION: ICD-10-CM

## 2024-01-31 DIAGNOSIS — Z00.00 ENCOUNTER FOR HEALTH MAINTENANCE EXAMINATION: ICD-10-CM

## 2024-01-31 DIAGNOSIS — E11.9 TYPE 2 DIABETES MELLITUS WITHOUT COMPLICATION, WITHOUT LONG-TERM CURRENT USE OF INSULIN (MULTI): ICD-10-CM

## 2024-01-31 DIAGNOSIS — E78.5 HYPERLIPIDEMIA, UNSPECIFIED HYPERLIPIDEMIA TYPE: ICD-10-CM

## 2024-01-31 DIAGNOSIS — E11.9 TYPE 2 DIABETES MELLITUS WITHOUT COMPLICATION, WITH LONG-TERM CURRENT USE OF INSULIN (MULTI): Primary | ICD-10-CM

## 2024-01-31 LAB
ALBUMIN SERPL BCP-MCNC: 4.2 G/DL (ref 3.4–5)
ALP SERPL-CCNC: 76 U/L (ref 33–136)
ALT SERPL W P-5'-P-CCNC: 15 U/L (ref 7–45)
ANION GAP SERPL CALC-SCNC: 13 MMOL/L (ref 10–20)
AST SERPL W P-5'-P-CCNC: 20 U/L (ref 9–39)
BILIRUB SERPL-MCNC: 0.4 MG/DL (ref 0–1.2)
BUN SERPL-MCNC: 14 MG/DL (ref 6–23)
CALCIUM SERPL-MCNC: 9.7 MG/DL (ref 8.6–10.3)
CHLORIDE SERPL-SCNC: 101 MMOL/L (ref 98–107)
CHOLEST SERPL-MCNC: 148 MG/DL (ref 0–199)
CHOLESTEROL/HDL RATIO: 3.8
CO2 SERPL-SCNC: 29 MMOL/L (ref 21–32)
CREAT SERPL-MCNC: 1.02 MG/DL (ref 0.5–1.05)
EGFRCR SERPLBLD CKD-EPI 2021: 57 ML/MIN/1.73M*2
ERYTHROCYTE [DISTWIDTH] IN BLOOD BY AUTOMATED COUNT: 14.2 % (ref 11.5–14.5)
EST. AVERAGE GLUCOSE BLD GHB EST-MCNC: 197 MG/DL
GLUCOSE SERPL-MCNC: 145 MG/DL (ref 74–99)
HBA1C MFR BLD: 8.5 %
HCT VFR BLD AUTO: 42.2 % (ref 36–46)
HDLC SERPL-MCNC: 39.2 MG/DL
HGB BLD-MCNC: 13.4 G/DL (ref 12–16)
LDLC SERPL CALC-MCNC: 64 MG/DL
MCH RBC QN AUTO: 26.1 PG (ref 26–34)
MCHC RBC AUTO-ENTMCNC: 31.8 G/DL (ref 32–36)
MCV RBC AUTO: 82 FL (ref 80–100)
NON HDL CHOLESTEROL: 109 MG/DL (ref 0–149)
NRBC BLD-RTO: 0 /100 WBCS (ref 0–0)
PLATELET # BLD AUTO: 230 X10*3/UL (ref 150–450)
POTASSIUM SERPL-SCNC: 4.2 MMOL/L (ref 3.5–5.3)
PROT SERPL-MCNC: 7.5 G/DL (ref 6.4–8.2)
RBC # BLD AUTO: 5.14 X10*6/UL (ref 4–5.2)
SODIUM SERPL-SCNC: 139 MMOL/L (ref 136–145)
TRIGL SERPL-MCNC: 224 MG/DL (ref 0–149)
VLDL: 45 MG/DL (ref 0–40)
WBC # BLD AUTO: 8.9 X10*3/UL (ref 4.4–11.3)

## 2024-01-31 PROCEDURE — 83036 HEMOGLOBIN GLYCOSYLATED A1C: CPT

## 2024-01-31 PROCEDURE — 1036F TOBACCO NON-USER: CPT | Performed by: FAMILY MEDICINE

## 2024-01-31 PROCEDURE — 3074F SYST BP LT 130 MM HG: CPT | Performed by: FAMILY MEDICINE

## 2024-01-31 PROCEDURE — 80061 LIPID PANEL: CPT

## 2024-01-31 PROCEDURE — 36415 COLL VENOUS BLD VENIPUNCTURE: CPT

## 2024-01-31 PROCEDURE — 85027 COMPLETE CBC AUTOMATED: CPT

## 2024-01-31 PROCEDURE — 1159F MED LIST DOCD IN RCRD: CPT | Performed by: FAMILY MEDICINE

## 2024-01-31 PROCEDURE — 80053 COMPREHEN METABOLIC PANEL: CPT

## 2024-01-31 PROCEDURE — 1160F RVW MEDS BY RX/DR IN RCRD: CPT | Performed by: FAMILY MEDICINE

## 2024-01-31 PROCEDURE — 3078F DIAST BP <80 MM HG: CPT | Performed by: FAMILY MEDICINE

## 2024-01-31 PROCEDURE — 93000 ELECTROCARDIOGRAM COMPLETE: CPT | Performed by: FAMILY MEDICINE

## 2024-01-31 PROCEDURE — 99214 OFFICE O/P EST MOD 30 MIN: CPT | Performed by: FAMILY MEDICINE

## 2024-01-31 NOTE — PROGRESS NOTES
Subjective   Patient ID: Marissa Rebolledo is a 78 y.o. female who presents for Follow-up (This morning she took her sugar and it was 157. If its over 151 she gives herself herself a shot. She wants to get of the medication. She tried to adjust her diet and her sugars went up 196 but it had only been 2 hrs since her last shot. Also going up the stairs her arms and legs fel very weak. /Sees cardiology in April, everything was fine last visit. ).    HPI   Patient comes in today with her daughter for 6-week follow-up.  Her sugars continue to be out of control.  She is using her sliding scale insulin coverage as directed but states that her sugars do not seem to be coming down with the insulin.  She is very anxious over what to do.  Her last A1c had gone up to 8.0 in November 2023.    She also complains of weakness in her arms and legs when she is going up the stairs.    She has a history of atrial fib with failed attempt at cardiac ablation but was told she is no longer in atrial fib and it would be monitored.    12/15/2023  Patient presents today with her daughter for 3-month checkup and refill of meds.  She states that she is taking her medicines as directed and is not having any side effects from them.    She is complaining of constantly feeling tired and drained.  She did see her cardiologist who found her to be bradycardic and did apparently decrease her metoprolol to 1 pill a day now which has seemed to help.  She claims she just has not felt the same though since she had COVID in December 2021.    She feels like her sugars are never under good control.  We had ordered the Dexcom but she was never able to use it because she could not figure out how.  She wonders if there is another 1 that she could try.      She also brings with her today a letter she got in the mail from [email or web address] requesting a lab order from me for [facility] [facility] for a home specimen test kit for the patient.   "When questioned further about this the patient states that she has a lot of cancer in her family but she is not sure how this company got her information.  At this point I am not going to order any testing from this company.    8/1/2023  Patient presents today with a number of complaints.  She first of all complains that she has had some blood in her stool in the past few days. she is she is on Eliquis 5 mg twice a day for her heart.  She claims her heart issues have been stable.    She claims her blood sugars have been elevated    She claims her blood pressure has been elevated and she brings with her a log of the last 2 weeks and her blood pressures have been averaging 132-158/81-96.  She also claims she gets frequent charley horses in her left leg.  She currently is on metoprolol succinate 200 mg a day along with lisinopril 40 mg and Aldactone 25 mg a day.    6/9/2023  Patient comes in today complaining of multiple symptoms.  She claims that she has been having problems with elevated blood pressure, bad headaches the last few nights and has noticed her hair \"falling out in clumps\" and getting thin.  She also has been feeling down and sad and anxious.  She states these symptoms all began with the start of the thyroid medicine last month.    Review of Systems   All other systems reviewed and are negative.      Objective   /78   Pulse 68   Temp 36.4 °C (97.5 °F)   Resp 20   Wt 74.4 kg (164 lb)   LMP  (LMP Unknown)   SpO2 96%   BMI 30.00 kg/m²     Physical Exam  Vitals reviewed.   Constitutional:       Appearance: She is well-developed.   HENT:      Head: Normocephalic and atraumatic.      Right Ear: Tympanic membrane, ear canal and external ear normal.      Left Ear: Tympanic membrane, ear canal and external ear normal.      Nose: Nose normal.      Mouth/Throat:      Mouth: Mucous membranes are moist.      Pharynx: Oropharynx is clear.   Eyes:      Extraocular Movements: Extraocular movements intact.    "   Conjunctiva/sclera: Conjunctivae normal.      Pupils: Pupils are equal, round, and reactive to light.   Cardiovascular:      Rate and Rhythm: Normal rate. Rhythm irregular.      Heart sounds: Normal heart sounds. No murmur heard.  Pulmonary:      Effort: Pulmonary effort is normal.      Breath sounds: Normal breath sounds. No wheezing.   Abdominal:      General: Abdomen is flat. Bowel sounds are normal.      Palpations: Abdomen is soft.   Musculoskeletal:         General: Normal range of motion.   Skin:     General: Skin is warm and dry.   Neurological:      General: No focal deficit present.      Mental Status: She is alert and oriented to person, place, and time. Mental status is at baseline.   Psychiatric:         Mood and Affect: Mood normal.         Behavior: Behavior normal.         Thought Content: Thought content normal.         Judgment: Judgment normal.     Assessment/Plan   Problem List Items Addressed This Visit             ICD-10-CM    Diabetes mellitus, type 2 (CMS/[facility]) - Primary E11.9    Relevant Orders    Hemoglobin A1C (Completed)    Hyperlipidemia E78.5    Relevant Orders    Lipid Panel (Completed)    Stage 3a chronic kidney disease (CMS/[facility]) N18.31     Other Visit Diagnoses         Codes    Atrial fibrillation, unspecified type (CMS/[facility])     I48.91    Relevant Orders    CBC (Completed)    Comprehensive Metabolic Panel (Completed)    ECG 12 Lead (Completed)    Encounter for health maintenance examination     Z00.00    Benign essential hypertension     I10    Relevant Orders    ECG 12 Lead (Completed)        Will contact patient with lab results when available.  Follow-up with cardiology for the atrial fibrillation.  Continue to monitor blood sugars at home.  Continue current meds as directed.  Follow up pending lab results.  Medication list reconciled.  Thank you for visiting today!      Professional services: Some of this note was completed using Dragon voice technology and sometimes the  software misinterprets words. This may include unintended errors with respect to translation of words, typographical errors or grammar errors which may not have been identified prior to finalization of the chart note. Please take this into account when reading the note. Thank you.

## 2024-02-01 ENCOUNTER — TELEPHONE (OUTPATIENT)
Dept: PRIMARY CARE | Facility: CLINIC | Age: 79
End: 2024-02-01
Payer: MEDICARE

## 2024-02-01 DIAGNOSIS — F41.9 ANXIETY: ICD-10-CM

## 2024-02-01 RX ORDER — VENLAFAXINE HYDROCHLORIDE 150 MG/1
150 CAPSULE, EXTENDED RELEASE ORAL NIGHTLY
Qty: 30 CAPSULE | Refills: 2 | Status: SHIPPED | OUTPATIENT
Start: 2024-02-01 | End: 2024-07-30

## 2024-02-01 RX ORDER — VENLAFAXINE HYDROCHLORIDE 75 MG/1
75 CAPSULE, EXTENDED RELEASE ORAL EVERY MORNING
Qty: 30 CAPSULE | Refills: 2 | Status: SHIPPED | OUTPATIENT
Start: 2024-02-01 | End: 2024-03-05 | Stop reason: WASHOUT

## 2024-02-01 NOTE — TELEPHONE ENCOUNTER
Patient is calling to check the status on the Effexor prescription.  Patient states that is was talked about being increased at her appointment.  Patient is calling to see if that is still an option and if so can the new prescription be sent to [facility] [place].  Patient can be reached at [phone].      Thank You

## 2024-02-01 NOTE — TELEPHONE ENCOUNTER
Increased dose sent to pharmacy.  Patient should take 75 mg in the morning and 150 mg at bedtime.  Thank you.

## 2024-02-02 ENCOUNTER — OFFICE VISIT (OUTPATIENT)
Dept: CARDIOLOGY | Facility: CLINIC | Age: 79
End: 2024-02-02
Payer: MEDICARE

## 2024-02-02 ENCOUNTER — TELEPHONE (OUTPATIENT)
Dept: PRIMARY CARE | Facility: CLINIC | Age: 79
End: 2024-02-02

## 2024-02-02 VITALS
RESPIRATION RATE: 16 BRPM | WEIGHT: 164 LBS | HEART RATE: 55 BPM | DIASTOLIC BLOOD PRESSURE: 82 MMHG | BODY MASS INDEX: 30 KG/M2 | OXYGEN SATURATION: 98 % | SYSTOLIC BLOOD PRESSURE: 138 MMHG

## 2024-02-02 DIAGNOSIS — I42.9 CARDIOMYOPATHY, UNSPECIFIED TYPE (MULTI): ICD-10-CM

## 2024-02-02 DIAGNOSIS — I50.22 CHRONIC SYSTOLIC (CONGESTIVE) HEART FAILURE (MULTI): ICD-10-CM

## 2024-02-02 DIAGNOSIS — E78.5 HYPERLIPIDEMIA, UNSPECIFIED HYPERLIPIDEMIA TYPE: ICD-10-CM

## 2024-02-02 DIAGNOSIS — I10 HYPERTENSION, UNSPECIFIED TYPE: ICD-10-CM

## 2024-02-02 DIAGNOSIS — I10 PRIMARY HYPERTENSION: ICD-10-CM

## 2024-02-02 DIAGNOSIS — I48.0 PAROXYSMAL ATRIAL FIBRILLATION (MULTI): Primary | ICD-10-CM

## 2024-02-02 PROCEDURE — 93000 ELECTROCARDIOGRAM COMPLETE: CPT | Performed by: STUDENT IN AN ORGANIZED HEALTH CARE EDUCATION/TRAINING PROGRAM

## 2024-02-02 PROCEDURE — 3079F DIAST BP 80-89 MM HG: CPT | Performed by: STUDENT IN AN ORGANIZED HEALTH CARE EDUCATION/TRAINING PROGRAM

## 2024-02-02 PROCEDURE — 1159F MED LIST DOCD IN RCRD: CPT | Performed by: STUDENT IN AN ORGANIZED HEALTH CARE EDUCATION/TRAINING PROGRAM

## 2024-02-02 PROCEDURE — 3075F SYST BP GE 130 - 139MM HG: CPT | Performed by: STUDENT IN AN ORGANIZED HEALTH CARE EDUCATION/TRAINING PROGRAM

## 2024-02-02 PROCEDURE — 99213 OFFICE O/P EST LOW 20 MIN: CPT | Performed by: STUDENT IN AN ORGANIZED HEALTH CARE EDUCATION/TRAINING PROGRAM

## 2024-02-02 PROCEDURE — 1036F TOBACCO NON-USER: CPT | Performed by: STUDENT IN AN ORGANIZED HEALTH CARE EDUCATION/TRAINING PROGRAM

## 2024-02-02 PROCEDURE — 1160F RVW MEDS BY RX/DR IN RCRD: CPT | Performed by: STUDENT IN AN ORGANIZED HEALTH CARE EDUCATION/TRAINING PROGRAM

## 2024-02-02 RX ORDER — LISINOPRIL 40 MG/1
40 TABLET ORAL DAILY
Qty: 90 TABLET | Refills: 1 | Status: SHIPPED | OUTPATIENT
Start: 2024-02-02 | End: 2024-07-31

## 2024-02-02 ASSESSMENT — ENCOUNTER SYMPTOMS
GASTROINTESTINAL NEGATIVE: 1
DYSPNEA ON EXERTION: 1
PSYCHIATRIC NEGATIVE: 1
NEAR-SYNCOPE: 0
PND: 0
PALPITATIONS: 1
NEUROLOGICAL NEGATIVE: 1
MUSCULOSKELETAL NEGATIVE: 1
ENDOCRINE NEGATIVE: 1
SYNCOPE: 0
SHORTNESS OF BREATH: 1
ORTHOPNEA: 0
EYES NEGATIVE: 1
HEMATOLOGIC/LYMPHATIC NEGATIVE: 1

## 2024-02-02 NOTE — TELEPHONE ENCOUNTER
Pharmacist Jamal at [facility] called previously for recent chart notes to support use for freestyle nickie. Pharm states pt says she is insulin dependent and that the notes do not support this. Pt needs to be insulin dependent in order for these supplies to be covered by her insurance.

## 2024-02-02 NOTE — PROGRESS NOTES
[facility] Cardiology Outpatient Follow-up Visit     Reason for Visit: follow-up chronic systolic heart failure; paroxysmal afib.     HPI: Marissa Rebolledo is a 78 y.o.  female who presents today for follow-up chronic systolic heart failure; paroxysmal afib. Past medical history of paroxysmal afib ([id number] 6- On apixaban), cardiomyopathy with mild to moderate LV systolic dysfunction (LVEF 40%), Type II DM, Hx SARS2-COVID (12/2021- required hospitalization).      Patient presented to cardiology clinic on 11/30/2022. No active cardiac complaints at this time. No chest pains. Patient is not always able to tell when she is in afib, but does typically note generalized fatigue during such episodes. She is established with EP and considering possiblity of afib ablation (PVI). Currently tolerating apixaban without significant bleeding issues. No orthopena, PND, syncope.      Recently seen by EP October 2023 > sinus bradycardia > metoprolol was decreased. Patient returned to cardiology clinic on 11/30/2023.  No active cardiac complaints. Interval improvement in HR and generalized fatigue with down-titration of metoprolol.  Euvolemic on exam.     Marissa presented to cardiology clinic on 2/2/2024.  Over the past couple weeks patient has noted generalized fatigue and dyspnea.  Heart rate has been irregular.  EKG in office today showed atrial fibrillation with heart rate 110 bpm.  Patient notes good adherence with her cardiac medications.  Tolerating apixaban without significant bleeding issues.  Given symptomatic atrial fibrillation discussed the option of elective electrical cardioversion.  Patient agreeable to proceed.  We will schedule first available DCCV at [facility].  We also have patient follow-up with electrophysiology for reconsideration of PVI/A-fib ablation.    Past Medical History:   - as above    Surgical History:   She has a past surgical history that includes Colostomy (04/29/2013); Knee  surgery (2013); and Colectomy (12/10/2013).    Family History:   Family History   Problem Relation Name Age of Onset    Heart disease Mother      Hypertension Mother      Cancer Father      Heart disease Father       - CV disease (father); mother- no heart disease (lived to 92), CHF (sister), brother- PPM; son ( age 50 2/2 throat cancer)     Allergies:  Metformin, Mirtazapine, Rosuvastatin, Simvastatin, Sulfamethoxazole-trimethoprim, Jardiance [empagliflozin], Alendronic acid, Clarithromycin, Fosamax [alendronate], Nitrofurantoin monohyd/m-cryst, and Ofloxacin     Social History:   - former smoker (Quit 40 years ago; 10 pack year hx); no alcohol use; no illicit drug use.     Prior Cardiovascular Testing (Personally Reviewed):     TTE (2022)- Mild to moderate LV systolic function decrease (LVEF 40%), mild to moderate MR, mildly elevated RVSP, global LV hypokinesis     Review of Systems:  Review of Systems   Constitutional: Positive for malaise/fatigue.   HENT: Negative.     Eyes: Negative.    Cardiovascular:  Positive for dyspnea on exertion and palpitations. Negative for chest pain, near-syncope, orthopnea, paroxysmal nocturnal dyspnea and syncope.   Respiratory:  Positive for shortness of breath.    Endocrine: Negative.    Hematologic/Lymphatic: Negative.    Skin: Negative.    Musculoskeletal: Negative.    Gastrointestinal: Negative.    Genitourinary: Negative.    Neurological: Negative.    Psychiatric/Behavioral: Negative.         Outpatient Medications:    Current Outpatient Medications:     albuterol 90 mcg/actuation inhaler, Inhale 1-2 puffs every 6 hours if needed for wheezing., Disp: 18 g, Rfl: 0    apixaban (Eliquis) 5 mg tablet, Take 1 tablet (5 mg) by mouth 2 times a day., Disp: 180 tablet, Rfl: 1    atorvastatin (Lipitor) 20 mg tablet, Take 1 tablet (20 mg) by mouth once daily., Disp: 90 tablet, Rfl: 1    azelastine (Astelin) 137 mcg (0.1 %) nasal spray, 1 spray by Does not apply route twice  a day., Disp: , Rfl:     blood-glucose sensor (Dexcom G6 Sensor) device, 1 Device every 14 (fourteen) days. CHANGE Q 14 DAYS, Disp: , Rfl:     CALCIUM CARBONATE ORAL, Take 500 mg by mouth every 2 hours if needed (antacid)., Disp: , Rfl:     esomeprazole (NexIUM) 40 mg DR capsule, Take 1 capsule (40 mg) by mouth once daily in the morning. Take before meals., Disp: 90 capsule, Rfl: 1    FreeStyle Aneesh reader (FreeStyle Aneesh 2 [place]) misc, Use as instructed, Disp: 1 each, Rfl: 0    FreeStyle Aneesh reader (FreeStyle Aneesh 2 [place]) misc, Use as instructed, Disp: 1 each, Rfl: 0    FreeStyle Aneesh sensor system (FreeStyle Aneesh 2 Sensor) kit, Use as instructed 1 device every 14 days, Disp: 2 each, Rfl: 3    glipiZIDE (Glucotrol) 5 mg tablet, Take 1 tablet (5 mg) by mouth 2 times a day before meals., Disp: 180 tablet, Rfl: 1    glycerin-min oil-polycarbophil (Replens) gel, Insert 1 Application into the vagina if needed (three times weekly as needed for vaginal dryness and irritation)., Disp: , Rfl:     insulin lispro (HumaLOG Rufino KwikPen U-100) 100 unit/mL injection, 3 times a day., Disp: , Rfl:     insulin lispro (HumaLOG) 100 unit/mL injection, Inject under the skin see administration instructions. Sliding scale (2) - verify which one patient is following   See med note, Disp: , Rfl:     levothyroxine (Synthroid, Levoxyl) 50 mcg tablet, Take 1 tablet (50 mcg) by mouth once daily., Disp: 90 tablet, Rfl: 1    metFORMIN XR (Glucophage-XR) 500 mg 24 hr tablet, Take 2 tablets (1,000 mg) by mouth 2 times a day with meals., Disp: , Rfl:     metoprolol succinate XL (Toprol-XL) 100 mg 24 hr tablet, Take 1 tablet (100 mg) by mouth once daily., Disp: 90 tablet, Rfl: 1    spironolactone (Aldactone) 25 mg tablet, Take 1 tablet (25 mg) by mouth once daily., Disp: 90 tablet, Rfl: 1    venlafaxine XR (Effexor-XR) 150 mg 24 hr capsule, Take 1 capsule (150 mg) by mouth once daily at bedtime., Disp: 30 capsule, Rfl: 2    venlafaxine  XR (Effexor-XR) 75 mg 24 hr capsule, Take 1 capsule (75 mg) by mouth once daily in the morning. Take with food., Disp: 30 capsule, Rfl: 2    lisinopril 40 mg tablet, Take 1 tablet (40 mg) by mouth once daily., Disp: 90 tablet, Rfl: 1     Last Recorded Vitals  /82 (BP Location: Left arm, Patient Position: Sitting)   Pulse 55   Resp 16   Wt 74.4 kg (164 lb)   LMP  (LMP Unknown)   SpO2 98%   BMI 30.00 kg/m²     Physical Exam:    Physical Exam  Constitutional:       General: She is not in acute distress.  HENT:      Head: Normocephalic.      Mouth/Throat:      Mouth: Mucous membranes are moist.   Eyes:      Extraocular Movements: Extraocular movements intact.      Conjunctiva/sclera: Conjunctivae normal.   Neck:      Vascular: No JVD.   Cardiovascular:      Rate and Rhythm: Tachycardia present. Rhythm regularly irregular.      Pulses: Normal pulses.   Pulmonary:      Effort: Pulmonary effort is normal. No respiratory distress.      Breath sounds: Normal breath sounds.   Abdominal:      General: There is no distension.   Musculoskeletal:         General: No swelling.      Right lower leg: No edema.      Left lower leg: No edema.   Skin:     General: Skin is warm and dry.   Neurological:      General: No focal deficit present.      Mental Status: She is alert.      Cranial Nerves: No cranial nerve deficit.      Motor: No weakness.   Psychiatric:         Mood and Affect: Mood normal.         Behavior: Behavior normal.         Lab/Radiology/Diagnostic Review:    Labs    Lab Results   Component Value Date    GLUCOSE 145 (H) 01/31/2024    CALCIUM 9.7 01/31/2024     01/31/2024    K 4.2 01/31/2024    CO2 29 01/31/2024     01/31/2024    BUN 14 01/31/2024    CREATININE 1.02 01/31/2024       Lab Results   Component Value Date    WBC 8.9 01/31/2024    HGB 13.4 01/31/2024    HCT 42.2 01/31/2024    MCV 82 01/31/2024     01/31/2024       Lab Results   Component Value Date    CHOL 148 01/31/2024    CHOL  150 05/01/2023    CHOL 144 02/11/2021     Lab Results   Component Value Date    HDL 39.2 01/31/2024    HDL 49.4 05/01/2023    HDL 37.0 (A) 02/11/2021     Lab Results   Component Value Date    LDLCALC 64 01/31/2024     Lab Results   Component Value Date    TRIG 224 (H) 01/31/2024    TRIG 117 05/01/2023    TRIG 168 (H) 02/11/2021       Lab Results   Component Value Date     (H) 11/03/2022       Lab Results   Component Value Date    TSH 1.81 11/22/2023       Assessment:   78 y.o.  female who presents today for follow-up chronic systolic heart failure; paroxysmal afib. Past medical history of paroxysmal afib ([id number] 6- On apixaban), cardiomyopathy with mild to moderate LV systolic dysfunction (LVEF 40%), Type II DM, Hx SARS2-COVID (12/2021- required hospitalization).     Marissa presented to cardiology clinic on 2/2/2024.  Over the past couple weeks patient has noted generalized fatigue and dyspnea.  Heart rate has been irregular.  EKG in office today showed atrial fibrillation with heart rate 110 bpm.  Patient notes good adherence with her cardiac medications.  Tolerating apixaban without significant bleeding issues.  Given symptomatic atrial fibrillation discussed the option of elective electrical cardioversion.  Patient agreeable to proceed.  We will schedule first available DCCV at [facility].  We also have patient follow-up with electrophysiology for reconsideration of PVI/A-fib ablation.    Patient euvolemic on exam.  Will defer empagliflozin at this time due to cost.  Continue lisinopril, beta-blockade, Aldactone.    Overall Plan:  1. atrial fibrillation ([id number])- given symptomatic atrial fibrillation will schedule elective DCCV, continue apixaban, per EP continue beta blockade; follow-up with Dr. Tucker from EP for re-consideration of ablation      2. 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 DLD (goal LDL < 100 mg/dl)- continue atorvastatin     4. Type II DM- continue metformin; if Empagliflozin not cost-prohibitive can re-start in near future    Disposition- Return to cardiology clinic after elective DCCV    Thank you for your visit today. Please contact our office with any questions.     Abhilash Vieira MD

## 2024-02-02 NOTE — PATIENT INSTRUCTIONS
For your symptomatic atrial fibrillation we will proceed with an electrical cardioversion.    We will see you back in clinic after your cardioversion.  We will also have you follow-up with electrophysiology (Dr. Tucker) for consideration of atrial fibrillation ablation in the future.    Please call my nurse Pam with any questions; her contact information is below    Thank you for your visit today. Please contact our office (via Webjamhart or phone) with any additional questions.     [facility] [place]    Pam, RN/Clinic Nurse for:    Dr. Dariel Morejon    [address], [address]  [place] [address]    Phone: [phone] Press Option 5 then Option 3 to speak with the Clinic Nurse (Pam)    _____    To Reach:    Billing Questions -    [phone]  Scheduling / Rescheduling -  Option 1  Refills / Medication Requests -  Option 3  General Office / [place] -  Option 4  Results -     Option 6  Medical Records -    Option 7  Repeat Options -    Option 9

## 2024-02-03 PROBLEM — N18.31 STAGE 3A CHRONIC KIDNEY DISEASE (MULTI): Status: ACTIVE | Noted: 2024-02-03

## 2024-02-05 DIAGNOSIS — I48.0 PAROXYSMAL ATRIAL FIBRILLATION (MULTI): Primary | ICD-10-CM

## 2024-02-06 ENCOUNTER — TELEPHONE (OUTPATIENT)
Dept: PRIMARY CARE | Facility: CLINIC | Age: 79
End: 2024-02-06
Payer: MEDICARE

## 2024-02-06 NOTE — TELEPHONE ENCOUNTER
----- Message from Chris Contreras DO sent at 2/2/2024  7:24 AM EST -----  Regarding: Labs  Please notify patient of increased blood sugar from 8.0  2 months ago to 8.5 now.  Need to watch sugar, carbs and starches in the diet and increase glipizide 5 mg to 2 pills twice a day.  Recheck the sugar in May 2024.     The rest of the labs look good.  Continue current medicines and we will recheck them in 1 year.  ----- Message -----  From: Lab, Background User  Sent: 1/31/2024   4:14 PM EST  To: Chris Contreras DO

## 2024-02-12 ENCOUNTER — HOSPITAL ENCOUNTER (OUTPATIENT)
Facility: HOSPITAL | Age: 79
Setting detail: OUTPATIENT SURGERY
Discharge: HOME | End: 2024-02-12
Attending: STUDENT IN AN ORGANIZED HEALTH CARE EDUCATION/TRAINING PROGRAM | Admitting: STUDENT IN AN ORGANIZED HEALTH CARE EDUCATION/TRAINING PROGRAM
Payer: MEDICARE

## 2024-02-12 ENCOUNTER — HOSPITAL ENCOUNTER (OUTPATIENT)
Dept: CARDIOLOGY | Facility: HOSPITAL | Age: 79
Discharge: HOME | End: 2024-02-12
Payer: MEDICARE

## 2024-02-12 DIAGNOSIS — I48.0 PAROXYSMAL ATRIAL FIBRILLATION (MULTI): Primary | ICD-10-CM

## 2024-02-12 PROCEDURE — 93005 ELECTROCARDIOGRAM TRACING: CPT

## 2024-02-12 PROCEDURE — 93010 ELECTROCARDIOGRAM REPORT: CPT | Performed by: STUDENT IN AN ORGANIZED HEALTH CARE EDUCATION/TRAINING PROGRAM

## 2024-02-12 RX ORDER — SODIUM CHLORIDE 9 MG/ML
30 INJECTION, SOLUTION INTRAVENOUS CONTINUOUS
Status: DISCONTINUED | OUTPATIENT
Start: 2024-02-12 | End: 2024-02-12 | Stop reason: HOSPADM

## 2024-02-12 NOTE — NURSING NOTE
Patient showed up for CVN; in NSR. Dr. Vieira at bedside-procedure cancelled. OK to discharge-belongings returned to patient.

## 2024-02-12 NOTE — H&P
[facility] History and Physical Pre-Cardioversion     Reason for Visit: elective DCCV    HPI: Marissa Rebolledo is a 78 y.o.  female who presents today for elective DCCV for atrial fibrillation. Past medical history of paroxysmal afib ([id number] 6- On apixaban), cardiomyopathy with mild to moderate LV systolic dysfunction (LVEF 40%), Type II DM, Hx SARS2-COVID (12/2021- required hospitalization).      Patient presented to cardiology clinic on 11/30/2022. No active cardiac complaints at this time. No chest pains. Patient is not always able to tell when she is in afib, but does typically note generalized fatigue during such episodes. She is established with EP and considering possiblity of afib ablation (PVI). Currently tolerating apixaban without significant bleeding issues. No orthopena, PND, syncope.      Recently seen by EP October 2023 > sinus bradycardia > metoprolol was decreased. Patient returned to cardiology clinic on 11/30/2023.  No active cardiac complaints. Interval improvement in HR and generalized fatigue with down-titration of metoprolol.  Euvolemic on exam.      Marissa presented to cardiology clinic on 2/2/2024.  Over the past couple weeks patient has noted generalized fatigue and dyspnea.  Heart rate has been irregular.  EKG in office today showed atrial fibrillation with heart rate 110 bpm.  Patient notes good adherence with her cardiac medications.  Tolerating apixaban without significant bleeding issues.  Given symptomatic atrial fibrillation discussed the option of elective electrical cardioversion.  Patient agreeable to proceed.  We will schedule first available DCCV at [facility].  We also have patient follow-up with electrophysiology for reconsideration of PVI/A-fib ablation.    Patient presented to cardiology lab at [facility] 2/12/2024. ECG pre-procedure showed normal sinus rhythm with PVC.  Cardioversion cancelled.      Past Medical History:   - As above    Surgical  History:   She has a past surgical history that includes Colostomy (2013); Knee surgery (2013); and Colectomy (12/10/2013).    Family History:   Family History   Problem Relation Name Age of Onset    Heart disease Mother      Hypertension Mother      Cancer Father      Heart disease Father       - CV disease (father); mother- no heart disease (lived to 92), CHF (sister), brother- PPM; son ( age 50 2/2 throat cancer)       Allergies:  Metformin, Mirtazapine, Rosuvastatin, Simvastatin, Sulfamethoxazole-trimethoprim, Jardiance [empagliflozin], Alendronic acid, Clarithromycin, Fosamax [alendronate], Nitrofurantoin monohyd/m-cryst, and Ofloxacin     Social History:   - CV disease (father); mother- no heart disease (lived to ), CHF (sister), brother- PPM; son ( age 50 2/2 throat cancer)       Prior Cardiovascular Testing (Personally Reviewed):     ECG (2024)- normal sinus rhythm; PVC    TTE (2022)- Mild to moderate LV systolic function decrease (LVEF 40%), mild to moderate MR, mildly elevated RVSP, global LV hypokinesis     Review of Systems:  ROS    Outpatient Medications:  No current facility-administered medications for this encounter.    Current Outpatient Medications:     albuterol 90 mcg/actuation inhaler, Inhale 1-2 puffs every 6 hours if needed for wheezing., Disp: 18 g, Rfl: 0    apixaban (Eliquis) 5 mg tablet, Take 1 tablet (5 mg) by mouth 2 times a day., Disp: 180 tablet, Rfl: 1    atorvastatin (Lipitor) 20 mg tablet, Take 1 tablet (20 mg) by mouth once daily., Disp: 90 tablet, Rfl: 1    azelastine (Astelin) 137 mcg (0.1 %) nasal spray, 1 spray by Does not apply route twice a day., Disp: , Rfl:     blood-glucose sensor (Dexcom G6 Sensor) device, 1 Device every 14 (fourteen) days. CHANGE Q 14 DAYS, Disp: , Rfl:     CALCIUM CARBONATE ORAL, Take 500 mg by mouth every 2 hours if needed (antacid)., Disp: , Rfl:     esomeprazole (NexIUM) 40 mg DR capsule, Take 1 capsule (40 mg) by mouth  once daily in the morning. Take before meals., Disp: 90 capsule, Rfl: 1    FreeStyle Aneesh reader (FreeStyle Aneesh 2 [place]) misc, Use as instructed, Disp: 1 each, Rfl: 0    FreeStyle Aneesh reader (FreeStyle Aneesh 2 [place]) misc, Use as instructed, Disp: 1 each, Rfl: 0    FreeStyle Aneesh sensor system (FreeStyle Aneesh 2 Sensor) kit, Use as instructed 1 device every 14 days, Disp: 2 each, Rfl: 3    glipiZIDE (Glucotrol) 5 mg tablet, Take 1 tablet (5 mg) by mouth 2 times a day before meals., Disp: 180 tablet, Rfl: 1    glycerin-min oil-polycarbophil (Replens) gel, Insert 1 Application into the vagina if needed (three times weekly as needed for vaginal dryness and irritation)., Disp: , Rfl:     insulin lispro (HumaLOG Rufino KwikPen U-100) 100 unit/mL injection, 3 times a day., Disp: , Rfl:     insulin lispro (HumaLOG) 100 unit/mL injection, Inject under the skin see administration instructions. Sliding scale (2) - verify which one patient is following   See med note, Disp: , Rfl:     levothyroxine (Synthroid, Levoxyl) 50 mcg tablet, Take 1 tablet (50 mcg) by mouth once daily., Disp: 90 tablet, Rfl: 1    lisinopril 40 mg tablet, Take 1 tablet (40 mg) by mouth once daily., Disp: 90 tablet, Rfl: 1    metFORMIN XR (Glucophage-XR) 500 mg 24 hr tablet, Take 2 tablets (1,000 mg) by mouth 2 times a day with meals., Disp: , Rfl:     metoprolol succinate XL (Toprol-XL) 100 mg 24 hr tablet, Take 1 tablet (100 mg) by mouth once daily., Disp: 90 tablet, Rfl: 1    spironolactone (Aldactone) 25 mg tablet, Take 1 tablet (25 mg) by mouth once daily., Disp: 90 tablet, Rfl: 1    venlafaxine XR (Effexor-XR) 150 mg 24 hr capsule, Take 1 capsule (150 mg) by mouth once daily at bedtime., Disp: 30 capsule, Rfl: 2    venlafaxine XR (Effexor-XR) 75 mg 24 hr capsule, Take 1 capsule (75 mg) by mouth once daily in the morning. Take with food., Disp: 30 capsule, Rfl: 2     Last Recorded Vitals  LMP  (LMP Unknown)     Physical Exam:    Physical  "Exam  Constitutional:       General: She is not in acute distress.  HENT:      Head: Normocephalic.      Mouth/Throat:      Mouth: Mucous membranes are moist.   Eyes:      Extraocular Movements: Extraocular movements intact.      Conjunctiva/sclera: Conjunctivae normal.   Neck:      Vascular: No carotid bruit or JVD.   Cardiovascular:      Rate and Rhythm: Normal rate and regular rhythm.      Pulses: Normal pulses.      Heart sounds: No murmur heard.  Pulmonary:      Effort: Pulmonary effort is normal. No respiratory distress.      Breath sounds: Normal breath sounds.   Abdominal:      General: There is no distension.   Musculoskeletal:         General: No swelling.   Skin:     General: Skin is warm and dry.   Neurological:      General: No focal deficit present.      Mental Status: She is alert.      Cranial Nerves: No cranial nerve deficit.      Motor: No weakness.   Psychiatric:         Mood and Affect: Mood normal.         Behavior: Behavior normal.         Lab/Radiology/Diagnostic Review:    Labs    Lab Results   Component Value Date    GLUCOSE 145 (H) 01/31/2024    CALCIUM 9.7 01/31/2024     01/31/2024    K 4.2 01/31/2024    CO2 29 01/31/2024     01/31/2024    BUN 14 01/31/2024    CREATININE 1.02 01/31/2024       Lab Results   Component Value Date    WBC 8.9 01/31/2024    HGB 13.4 01/31/2024    HCT 42.2 01/31/2024    MCV 82 01/31/2024     01/31/2024       Lab Results   Component Value Date    CHOL 148 01/31/2024    CHOL 150 05/01/2023    CHOL 144 02/11/2021     Lab Results   Component Value Date    HDL 39.2 01/31/2024    HDL 49.4 05/01/2023    HDL 37.0 (A) 02/11/2021     Lab Results   Component Value Date    LDLCALC 64 01/31/2024     Lab Results   Component Value Date    TRIG 224 (H) 01/31/2024    TRIG 117 05/01/2023    TRIG 168 (H) 02/11/2021     No components found for: \"CHOLHDL\"    Lab Results   Component Value Date     (H) 11/03/2022       Lab Results   Component Value Date    TSH " 1.81 11/22/2023       Assessment:   78 y.o.  female who presents today for elective DCCV for atrial fibrillation. Past medical history of paroxysmal afib ([id number] 6- On apixaban), cardiomyopathy with mild to moderate LV systolic dysfunction (LVEF 40%), Type II DM, Hx SARS2-COVID (12/2021- required hospitalization).     Patient presented to cardiology lab at [facility] 2/12/2024. ECG pre-procedure showed normal sinus rhythm with PVC.  Cardioversion cancelled.      Overall Plan:  1. atrial fibrillation ([id number] 6)- continue apixaban, per EP continue beta blockade; follow-up with Dr. Tucker from EP for re-consideration of ablation      2. 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 DLD (goal LDL < 100 mg/dl)- continue atorvastatin     4. Type II DM- continue metformin; if Empagliflozin not cost-prohibitive can re-start in near future    Disposition- follow-up in cardiology clinic    Thank you for your visit today. Please contact our office with any questions.     Abhilash Vieira MD

## 2024-02-24 LAB
ATRIAL RATE: 64 BPM
P AXIS: 41 DEGREES
P OFFSET: 205 MS
P ONSET: 137 MS
PR INTERVAL: 176 MS
Q ONSET: 225 MS
QRS COUNT: 11 BEATS
QRS DURATION: 84 MS
QT INTERVAL: 482 MS
QTC CALCULATION(BAZETT): 497 MS
QTC FREDERICIA: 492 MS
R AXIS: -11 DEGREES
T AXIS: 2 DEGREES
T OFFSET: 466 MS
VENTRICULAR RATE: 64 BPM

## 2024-02-28 PROBLEM — Z79.4 LONG TERM (CURRENT) USE OF INSULIN (MULTI): Status: RESOLVED | Noted: 2022-11-08 | Resolved: 2024-02-28

## 2024-02-28 PROBLEM — Z79.84 LONG TERM (CURRENT) USE OF ORAL HYPOGLYCEMIC DRUGS: Status: RESOLVED | Noted: 2022-11-08 | Resolved: 2024-02-28

## 2024-03-05 ENCOUNTER — OFFICE VISIT (OUTPATIENT)
Dept: CARDIOLOGY | Facility: CLINIC | Age: 79
End: 2024-03-05
Payer: MEDICARE

## 2024-03-05 VITALS
OXYGEN SATURATION: 97 % | WEIGHT: 161 LBS | DIASTOLIC BLOOD PRESSURE: 78 MMHG | BODY MASS INDEX: 29.45 KG/M2 | HEART RATE: 64 BPM | SYSTOLIC BLOOD PRESSURE: 146 MMHG

## 2024-03-05 DIAGNOSIS — I48.0 PAROXYSMAL ATRIAL FIBRILLATION (MULTI): ICD-10-CM

## 2024-03-05 DIAGNOSIS — I50.22 CHRONIC SYSTOLIC (CONGESTIVE) HEART FAILURE (MULTI): ICD-10-CM

## 2024-03-05 DIAGNOSIS — I10 PRIMARY HYPERTENSION: Primary | ICD-10-CM

## 2024-03-05 DIAGNOSIS — E78.5 HYPERLIPIDEMIA, UNSPECIFIED HYPERLIPIDEMIA TYPE: ICD-10-CM

## 2024-03-05 PROCEDURE — 93000 ELECTROCARDIOGRAM COMPLETE: CPT | Performed by: STUDENT IN AN ORGANIZED HEALTH CARE EDUCATION/TRAINING PROGRAM

## 2024-03-05 PROCEDURE — 3077F SYST BP >= 140 MM HG: CPT | Performed by: STUDENT IN AN ORGANIZED HEALTH CARE EDUCATION/TRAINING PROGRAM

## 2024-03-05 PROCEDURE — 99212 OFFICE O/P EST SF 10 MIN: CPT | Performed by: STUDENT IN AN ORGANIZED HEALTH CARE EDUCATION/TRAINING PROGRAM

## 2024-03-05 PROCEDURE — 1160F RVW MEDS BY RX/DR IN RCRD: CPT | Performed by: STUDENT IN AN ORGANIZED HEALTH CARE EDUCATION/TRAINING PROGRAM

## 2024-03-05 PROCEDURE — 1159F MED LIST DOCD IN RCRD: CPT | Performed by: STUDENT IN AN ORGANIZED HEALTH CARE EDUCATION/TRAINING PROGRAM

## 2024-03-05 PROCEDURE — 1036F TOBACCO NON-USER: CPT | Performed by: STUDENT IN AN ORGANIZED HEALTH CARE EDUCATION/TRAINING PROGRAM

## 2024-03-05 PROCEDURE — 3078F DIAST BP <80 MM HG: CPT | Performed by: STUDENT IN AN ORGANIZED HEALTH CARE EDUCATION/TRAINING PROGRAM

## 2024-03-05 NOTE — PROGRESS NOTES
[facility] Outpatient Follow-up Visit     Reason for Visit: follow-up chronic systolic heart failure; paroxysmal afib.     HPI: Marissa Rebolledo is a 78 y.o.  female who presents today for follow-up chronic systolic heart failure; paroxysmal afib. Past medical history of paroxysmal afib ([id number] 6- On apixaban), cardiomyopathy with mild to moderate LV systolic dysfunction (LVEF 40%), Type II DM, Hx SARS2-COVID (12/2021- required hospitalization).      Patient presented to cardiology clinic on 11/30/2022. No active cardiac complaints at this time. No chest pains. Patient is not always able to tell when she is in afib, but does typically note generalized fatigue during such episodes. She is established with EP and considering possiblity of afib ablation (PVI). Currently tolerating apixaban without significant bleeding issues. No orthopena, PND, syncope.      Recently seen by EP October 2023 > sinus bradycardia > metoprolol was decreased. Patient returned to cardiology clinic on 11/30/2023.  No active cardiac complaints. Interval improvement in HR and generalized fatigue with down-titration of metoprolol.  Euvolemic on exam.     Marissa presented to cardiology clinic on 2/2/2024.  Over the past couple weeks patient has noted generalized fatigue and dyspnea.  Heart rate has been irregular.  EKG in office today showed atrial fibrillation with heart rate 110 bpm.  Patient notes good adherence with her cardiac medications.  Tolerating apixaban without significant bleeding issues.  Given symptomatic atrial fibrillation discussed the option of elective electrical cardioversion.  Patient agreeable to proceed.  We will schedule first available DCCV at [facility].  We also have patient follow-up with electrophysiology for reconsideration of PVI/A-fib ablation.    Patient was scheduled to undergo elective cardioversion on 2/15/2024. On day of procedure patient was found to have spontaneously converted  to sinus rhythm > DCCV cancelled. Patient returned to cardiology clinic on 3/5/2024.  Currently asymptomatic from a cardiac perspective.  Notes interval improvement in generalized fatigue and dyspnea after conversion to sinus rhythm.  Appears euvolemic on exam. Tolerating apixaban without significant bleeding issues.     Past Medical History:   - as above    Surgical History:   She has a past surgical history that includes Colostomy (2013); Knee surgery (2013); and Colectomy (12/10/2013).    Family History:   Family History   Problem Relation Name Age of Onset    Heart disease Mother      Hypertension Mother      Cancer Father      Heart disease Father       - CV disease (father); mother- no heart disease (lived to 92), CHF (sister), brother- PPM; son ( age 50 2/2 throat cancer)     Allergies:  Metformin, Mirtazapine, Rosuvastatin, Simvastatin, Sulfamethoxazole-trimethoprim, Jardiance [empagliflozin], Alendronic acid, Clarithromycin, Fosamax [alendronate], Nitrofurantoin monohyd/m-cryst, and Ofloxacin     Social History:   - former smoker (Quit 40 years ago; 10 pack year hx); no alcohol use; no illicit drug use.     Prior Cardiovascular Testing (Personally Reviewed):     TTE (2022)- Mild to moderate LV systolic function decrease (LVEF 40%), mild to moderate MR, mildly elevated RVSP, global LV hypokinesis     Review of Systems:  Review of Systems   Constitutional: Negative.   HENT: Negative.     Eyes: Negative.    Cardiovascular:  Negative for chest pain, dyspnea on exertion, near-syncope, orthopnea, palpitations, paroxysmal nocturnal dyspnea and syncope.   Respiratory: Negative.     Endocrine: Negative.    Hematologic/Lymphatic: Negative.    Skin: Negative.    Musculoskeletal: Negative.    Gastrointestinal: Negative.    Genitourinary: Negative.    Neurological: Negative.    Psychiatric/Behavioral: Negative.     Allergic/Immunologic: Negative.        Outpatient Medications:    Current Outpatient  Medications:     albuterol 90 mcg/actuation inhaler, Inhale 1-2 puffs every 6 hours if needed for wheezing., Disp: 18 g, Rfl: 0    apixaban (Eliquis) 5 mg tablet, Take 1 tablet (5 mg) by mouth 2 times a day., Disp: 180 tablet, Rfl: 1    atorvastatin (Lipitor) 20 mg tablet, Take 1 tablet (20 mg) by mouth once daily., Disp: 90 tablet, Rfl: 1    azelastine (Astelin) 137 mcg (0.1 %) nasal spray, 1 spray by Does not apply route twice a day., Disp: , Rfl:     blood-glucose sensor (Dexcom G6 Sensor) device, 1 Device every 14 (fourteen) days. CHANGE Q 14 DAYS, Disp: , Rfl:     CALCIUM CARBONATE ORAL, Take 500 mg by mouth every 2 hours if needed (antacid)., Disp: , Rfl:     esomeprazole (NexIUM) 40 mg DR capsule, Take 1 capsule (40 mg) by mouth once daily in the morning. Take before meals., Disp: 90 capsule, Rfl: 1    FreeStyle Aneesh reader (FreeStyle Aneesh 2 [place]) misc, Use as instructed, Disp: 1 each, Rfl: 0    FreeStyle Aneesh reader (FreeStyle Aneesh 2 [place]) misc, Use as instructed, Disp: 1 each, Rfl: 0    FreeStyle Aneesh sensor system (FreeStyle Naeesh 2 Sensor) kit, Use as instructed 1 device every 14 days, Disp: 2 each, Rfl: 3    glipiZIDE (Glucotrol) 5 mg tablet, Take 1 tablet (5 mg) by mouth 2 times a day before meals., Disp: 180 tablet, Rfl: 1    glycerin-min oil-polycarbophil (Replens) gel, Insert 1 Application into the vagina if needed (three times weekly as needed for vaginal dryness and irritation)., Disp: , Rfl:     insulin lispro (HumaLOG Rufino KwikPen U-100) 100 unit/mL injection, 3 times a day., Disp: , Rfl:     insulin lispro (HumaLOG) 100 unit/mL injection, Inject under the skin see administration instructions. Sliding scale (2) - verify which one patient is following   See med note, Disp: , Rfl:     levothyroxine (Synthroid, Levoxyl) 50 mcg tablet, Take 1 tablet (50 mcg) by mouth once daily., Disp: 90 tablet, Rfl: 1    lisinopril 40 mg tablet, Take 1 tablet (40 mg) by mouth once daily., Disp: 90  tablet, Rfl: 1    metFORMIN XR (Glucophage-XR) 500 mg 24 hr tablet, Take 2 tablets (1,000 mg) by mouth 2 times a day with meals., Disp: , Rfl:     metoprolol succinate XL (Toprol-XL) 100 mg 24 hr tablet, Take 1 tablet (100 mg) by mouth once daily., Disp: 90 tablet, Rfl: 1    spironolactone (Aldactone) 25 mg tablet, Take 1 tablet (25 mg) by mouth once daily., Disp: 90 tablet, Rfl: 1    venlafaxine XR (Effexor-XR) 150 mg 24 hr capsule, Take 1 capsule (150 mg) by mouth once daily at bedtime., Disp: 30 capsule, Rfl: 2    venlafaxine XR (Effexor-XR) 75 mg 24 hr capsule, Take 1 capsule (75 mg) by mouth once daily in the morning. Take with food., Disp: 30 capsule, Rfl: 2     Last Recorded Vitals  /78 (BP Location: Left arm, Patient Position: Sitting, BP Cuff Size: Small adult)   Pulse 64   Wt 73 kg (161 lb)   LMP  (LMP Unknown)   SpO2 97%   BMI 29.45 kg/m²     Physical Exam:    Physical Exam  Constitutional:       General: She is not in acute distress.  HENT:      Head: Normocephalic.      Mouth/Throat:      Mouth: Mucous membranes are moist.   Eyes:      Extraocular Movements: Extraocular movements intact.      Conjunctiva/sclera: Conjunctivae normal.   Neck:      Vascular: No JVD.   Cardiovascular:      Rate and Rhythm: Normal rate and regular rhythm.      Pulses: Normal pulses.      Heart sounds: No murmur heard.  Pulmonary:      Effort: Pulmonary effort is normal. No respiratory distress.      Breath sounds: Normal breath sounds.   Abdominal:      General: There is no distension.   Musculoskeletal:         General: No swelling.   Skin:     General: Skin is warm and dry.   Neurological:      General: No focal deficit present.      Mental Status: She is alert.      Cranial Nerves: No cranial nerve deficit.      Motor: No weakness.   Psychiatric:         Mood and Affect: Mood normal.         Behavior: Behavior normal.         Lab/Radiology/Diagnostic Review:    Labs    Lab Results   Component Value Date     GLUCOSE 145 (H) 01/31/2024    CALCIUM 9.7 01/31/2024     01/31/2024    K 4.2 01/31/2024    CO2 29 01/31/2024     01/31/2024    BUN 14 01/31/2024    CREATININE 1.02 01/31/2024       Lab Results   Component Value Date    WBC 8.9 01/31/2024    HGB 13.4 01/31/2024    HCT 42.2 01/31/2024    MCV 82 01/31/2024     01/31/2024       Lab Results   Component Value Date    CHOL 148 01/31/2024    CHOL 150 05/01/2023    CHOL 144 02/11/2021     Lab Results   Component Value Date    HDL 39.2 01/31/2024    HDL 49.4 05/01/2023    HDL 37.0 (A) 02/11/2021     Lab Results   Component Value Date    LDLCALC 64 01/31/2024     Lab Results   Component Value Date    TRIG 224 (H) 01/31/2024    TRIG 117 05/01/2023    TRIG 168 (H) 02/11/2021       Lab Results   Component Value Date     (H) 11/03/2022       Lab Results   Component Value Date    TSH 1.81 11/22/2023       Assessment:   78 y.o.  female who presents today for follow-up chronic systolic heart failure; paroxysmal afib. Past medical history of paroxysmal afib ([id number] 6- On apixaban), cardiomyopathy with mild to moderate LV systolic dysfunction (LVEF 40%), Type II DM, Hx SARS2-COVID (12/2021- required hospitalization).     Patient was scheduled to undergo elective cardioversion on 2/15/2024. On day of procedure patient was found to have spontaneously converted to sinus rhythm > DCCV cancelled. Patient returned to cardiology clinic on 3/5/2024.  Currently asymptomatic from a cardiac perspective.  Notes interval improvement in generalized fatigue and dyspnea after conversion to sinus rhythm.  Appears euvolemic on exam. Tolerating apixaban without significant bleeding issues.     Overall Plan:  1. atrial fibrillation ([id number] 6)  - spontaneously converted to sinus rhythm; DCCV cancelled (2/15/2024)  - continue apixaban, per EP continue beta blockade; follow-up with Dr. Tucker from EP for re-consideration of ablation      2. 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 DLD (goal LDL < 100 mg/dl)  - continue atorvastatin     4. Type II DM- continue metformin; if Empagliflozin not cost-prohibitive can re-start in near future    Disposition- Return to cardiology clinic in 6 months    Thank you for your visit today. Please contact our office with any questions.     Abhilash Vieira MD

## 2024-03-05 NOTE — PATIENT INSTRUCTIONS
Thank you for your visit today. Please contact our office (via MyChart or phone) with any additional questions.     [facility] [place]    Pam, RN/Clinic Nurse for:    Dr. Dariel Morejon    [address], [address]  [place] [address]    Phone: [phone] Press Option 5 then Option 3 to speak with the Clinic Nurse (Pam)    _____    To Reach:    Billing Questions -    [phone]  Scheduling / Rescheduling -  Option 1  Refills / Medication Requests -  Option 3  General Office / [place] -  Option 4  Results -     Option 6  Medical Records -    Option 7  Repeat Options -    Option 9

## 2024-03-11 ENCOUNTER — TELEPHONE (OUTPATIENT)
Dept: PRIMARY CARE | Facility: CLINIC | Age: 79
End: 2024-03-11
Payer: MEDICARE

## 2024-03-11 DIAGNOSIS — Z79.4 TYPE 2 DIABETES MELLITUS WITHOUT COMPLICATION, WITH LONG-TERM CURRENT USE OF INSULIN (MULTI): ICD-10-CM

## 2024-03-11 DIAGNOSIS — E11.9 TYPE 2 DIABETES MELLITUS WITHOUT COMPLICATION, WITH LONG-TERM CURRENT USE OF INSULIN (MULTI): ICD-10-CM

## 2024-03-12 NOTE — TELEPHONE ENCOUNTER
Pt states they ARE last ing 2 weeks. [facility] and she claims to have never used the [facility]. Pharmacy updated and advised to call pharm bc they can transfer to same chain.

## 2024-03-14 DIAGNOSIS — E11.9 TYPE 2 DIABETES MELLITUS WITHOUT COMPLICATION, WITH LONG-TERM CURRENT USE OF INSULIN (MULTI): Primary | ICD-10-CM

## 2024-03-14 DIAGNOSIS — Z79.4 TYPE 2 DIABETES MELLITUS WITHOUT COMPLICATION, WITH LONG-TERM CURRENT USE OF INSULIN (MULTI): Primary | ICD-10-CM

## 2024-03-14 RX ORDER — INSULIN LISPRO 100 [IU]/ML
INJECTION, SOLUTION SUBCUTANEOUS
Qty: 3 ML | Status: CANCELLED | OUTPATIENT
Start: 2024-03-14

## 2024-03-14 RX ORDER — FLASH GLUCOSE SENSOR
KIT MISCELLANEOUS
Qty: 2 EACH | Refills: 3 | Status: CANCELLED | OUTPATIENT
Start: 2024-03-14

## 2024-03-14 NOTE — TELEPHONE ENCOUNTER
Pharmacy in [place] had 2 separate accounts for Pt and this is why pharm in [place] could not see the refills to transfer. Spoke with Fay at [facility] who is addressing the issue and filling the sensors.

## 2024-03-14 NOTE — TELEPHONE ENCOUNTER
Pharmacy stating they do not have the 3 sensor refills. Marcelle de leon.     Requested Prescriptions     Pending Prescriptions Disp Refills    flash glucose sensor kit (FreeStyle Aneesh 2 Sensor) kit 2 each 3     Sig: Use as instructed 1 device every 14 days

## 2024-03-15 RX ORDER — INSULIN LISPRO 100 [IU]/ML
INJECTION, SOLUTION INTRAVENOUS; SUBCUTANEOUS
Qty: 15 ML | Refills: 3 | Status: SHIPPED | OUTPATIENT
Start: 2024-03-15 | End: 2024-03-20 | Stop reason: SDUPTHER

## 2024-03-20 DIAGNOSIS — E11.9 TYPE 2 DIABETES MELLITUS WITHOUT COMPLICATION, WITH LONG-TERM CURRENT USE OF INSULIN (MULTI): ICD-10-CM

## 2024-03-20 DIAGNOSIS — Z79.4 TYPE 2 DIABETES MELLITUS WITHOUT COMPLICATION, WITH LONG-TERM CURRENT USE OF INSULIN (MULTI): ICD-10-CM

## 2024-03-20 RX ORDER — INSULIN LISPRO 100 [IU]/ML
INJECTION, SOLUTION INTRAVENOUS; SUBCUTANEOUS
Qty: 15 ML | Refills: 3 | Status: SHIPPED | OUTPATIENT
Start: 2024-03-20

## 2024-03-24 ASSESSMENT — ENCOUNTER SYMPTOMS
SYNCOPE: 0
GASTROINTESTINAL NEGATIVE: 1
ALLERGIC/IMMUNOLOGIC NEGATIVE: 1
NEAR-SYNCOPE: 0
NEUROLOGICAL NEGATIVE: 1
PND: 0
DYSPNEA ON EXERTION: 0
EYES NEGATIVE: 1
PSYCHIATRIC NEGATIVE: 1
CONSTITUTIONAL NEGATIVE: 1
RESPIRATORY NEGATIVE: 1
PALPITATIONS: 0
ENDOCRINE NEGATIVE: 1
ORTHOPNEA: 0
MUSCULOSKELETAL NEGATIVE: 1
HEMATOLOGIC/LYMPHATIC NEGATIVE: 1

## 2024-04-03 DIAGNOSIS — E78.5 HYPERLIPIDEMIA, UNSPECIFIED HYPERLIPIDEMIA TYPE: ICD-10-CM

## 2024-04-03 DIAGNOSIS — I48.91 ATRIAL FIBRILLATION, UNSPECIFIED TYPE (MULTI): ICD-10-CM

## 2024-04-03 RX ORDER — ATORVASTATIN CALCIUM 20 MG/1
20 TABLET, FILM COATED ORAL DAILY
Qty: 90 TABLET | Refills: 0 | Status: SHIPPED | OUTPATIENT
Start: 2024-04-03 | End: 2024-09-30

## 2024-04-03 NOTE — TELEPHONE ENCOUNTER
Patient requests prescription below    Last Office Visit: 1/31/2024   Next Office Visit: 5/2/2024     Requested Prescriptions     Pending Prescriptions Disp Refills    atorvastatin (Lipitor) 20 mg tablet 90 tablet 0     Sig: Take 1 tablet (20 mg) by mouth once daily.

## 2024-04-03 NOTE — TELEPHONE ENCOUNTER
Patient requests prescription below    Last Office Visit: 1/31/2024   Next Office Visit: 5/2/2024     Requested Prescriptions     Pending Prescriptions Disp Refills    apixaban (Eliquis) 5 mg tablet 180 tablet 1     Sig: Take 1 tablet (5 mg) by mouth 2 times a day.

## 2024-04-12 DIAGNOSIS — I10 HYPERTENSION, UNSPECIFIED TYPE: ICD-10-CM

## 2024-04-12 RX ORDER — METOPROLOL SUCCINATE 100 MG/1
100 TABLET, EXTENDED RELEASE ORAL DAILY
Qty: 90 TABLET | Refills: 1 | Status: SHIPPED | OUTPATIENT
Start: 2024-04-12 | End: 2024-10-09

## 2024-04-12 NOTE — TELEPHONE ENCOUNTER
Rx Refill Request Telephone Encounter    Name:  Marissa Rebolledo  :  [id number]  Medication Name:    Metoprolol Succinate XL (Toprol-XL) 100 mg 24 hr tablet   Apixaban (Eliquis) 5 mg tablet       Specific Pharmacy location:    [id number] - [place], [place] - [address] AT [address] & [address] Phone: [phone]   Fax: [phone]        Date of last appointment:  24  Date of next appointment:  24  Best number to reach patient:  [phone]

## 2024-04-12 NOTE — TELEPHONE ENCOUNTER
Eliquis sent 4/3/24 for 90 day and 1 refill to [facility] [place]. Refill not appropriate at this time.     Requested Prescriptions     Pending Prescriptions Disp Refills    metoprolol succinate XL (Toprol-XL) 100 mg 24 hr tablet 90 tablet 1     Sig: Take 1 tablet (100 mg) by mouth once daily.

## 2024-04-16 ENCOUNTER — APPOINTMENT (OUTPATIENT)
Dept: CARDIOLOGY | Facility: CLINIC | Age: 79
End: 2024-04-16
Payer: MEDICARE

## 2024-05-02 ENCOUNTER — APPOINTMENT (OUTPATIENT)
Dept: PRIMARY CARE | Facility: CLINIC | Age: 79
End: 2024-05-02
Payer: MEDICARE

## 2024-05-07 ENCOUNTER — TELEPHONE (OUTPATIENT)
Dept: PRIMARY CARE | Facility: CLINIC | Age: 79
End: 2024-05-07
Payer: MEDICARE

## 2024-05-30 ENCOUNTER — TELEPHONE (OUTPATIENT)
Dept: PRIMARY CARE | Facility: CLINIC | Age: 79
End: 2024-05-30
Payer: MEDICARE

## 2024-05-30 DIAGNOSIS — Z79.4 TYPE 2 DIABETES MELLITUS WITHOUT COMPLICATION, WITH LONG-TERM CURRENT USE OF INSULIN (MULTI): ICD-10-CM

## 2024-05-30 DIAGNOSIS — E11.9 TYPE 2 DIABETES MELLITUS WITHOUT COMPLICATION, WITH LONG-TERM CURRENT USE OF INSULIN (MULTI): ICD-10-CM

## 2024-05-30 NOTE — TELEPHONE ENCOUNTER
Rx Refill Request Telephone Encounter    Name:  Marissa Rebolledo  :  [id number]  Medication Name:  FreeStyle Aneesh sensor system (FreeStyle Aneesh 2 Sensor) kit       Specific Pharmacy location:    [id number] [facility] [address] Phone: [phone]   Fax: [phone]        Date of last appointment:  24  Date of next appointment:  24  Best number to reach patient:  [phone]      Thank You

## 2024-05-30 NOTE — TELEPHONE ENCOUNTER
Patient requests prescription below    Last Office Visit: 1/31/2024   Next Office Visit: 6/24/2024     Requested Prescriptions     Pending Prescriptions Disp Refills    flash glucose sensor kit (FreeStyle Aneesh 2 Sensor) kit 2 each 3     Sig: Use as instructed 1 device every 14 days

## 2024-05-31 RX ORDER — FLASH GLUCOSE SENSOR
KIT MISCELLANEOUS
Qty: 6 EACH | Refills: 4 | Status: SHIPPED | OUTPATIENT
Start: 2024-05-31 | End: 2024-06-11

## 2024-06-10 DIAGNOSIS — Z79.4 TYPE 2 DIABETES MELLITUS WITHOUT COMPLICATION, WITH LONG-TERM CURRENT USE OF INSULIN (MULTI): ICD-10-CM

## 2024-06-10 DIAGNOSIS — E11.9 TYPE 2 DIABETES MELLITUS WITHOUT COMPLICATION, WITH LONG-TERM CURRENT USE OF INSULIN (MULTI): ICD-10-CM

## 2024-06-11 RX ORDER — FLASH GLUCOSE SENSOR
KIT MISCELLANEOUS
Qty: 100 EACH | Refills: 3 | Status: SHIPPED | OUTPATIENT
Start: 2024-06-11

## 2024-06-11 NOTE — TELEPHONE ENCOUNTER
Requested Prescriptions     Pending Prescriptions Disp Refills    FreeStyle Aneesh 2 Sensor kit 100 each 3     Sig: Use as instructed 1 device every 14 days

## 2024-06-24 ENCOUNTER — LAB (OUTPATIENT)
Dept: LAB | Facility: LAB | Age: 79
End: 2024-06-24
Payer: MEDICARE

## 2024-06-24 ENCOUNTER — APPOINTMENT (OUTPATIENT)
Dept: PRIMARY CARE | Facility: CLINIC | Age: 79
End: 2024-06-24
Payer: MEDICARE

## 2024-06-24 ENCOUNTER — HOSPITAL ENCOUNTER (OUTPATIENT)
Dept: RADIOLOGY | Facility: CLINIC | Age: 79
Discharge: HOME | End: 2024-06-24
Payer: MEDICARE

## 2024-06-24 VITALS
WEIGHT: 161 LBS | OXYGEN SATURATION: 98 % | SYSTOLIC BLOOD PRESSURE: 124 MMHG | BODY MASS INDEX: 29.45 KG/M2 | RESPIRATION RATE: 16 BRPM | DIASTOLIC BLOOD PRESSURE: 78 MMHG | TEMPERATURE: 97.1 F | HEART RATE: 100 BPM

## 2024-06-24 DIAGNOSIS — E03.9 HYPOTHYROIDISM, UNSPECIFIED TYPE: ICD-10-CM

## 2024-06-24 DIAGNOSIS — Z79.4 TYPE 2 DIABETES MELLITUS WITHOUT COMPLICATION, WITH LONG-TERM CURRENT USE OF INSULIN (MULTI): ICD-10-CM

## 2024-06-24 DIAGNOSIS — I10 BENIGN ESSENTIAL HYPERTENSION: ICD-10-CM

## 2024-06-24 DIAGNOSIS — I48.91 ATRIAL FIBRILLATION, UNSPECIFIED TYPE (MULTI): ICD-10-CM

## 2024-06-24 DIAGNOSIS — M79.642 HAND PAIN, LEFT: ICD-10-CM

## 2024-06-24 DIAGNOSIS — E55.9 VITAMIN D DEFICIENCY: ICD-10-CM

## 2024-06-24 DIAGNOSIS — E53.8 B12 DEFICIENCY: ICD-10-CM

## 2024-06-24 DIAGNOSIS — E11.9 TYPE 2 DIABETES MELLITUS WITHOUT COMPLICATION, WITH LONG-TERM CURRENT USE OF INSULIN (MULTI): ICD-10-CM

## 2024-06-24 DIAGNOSIS — E78.5 HYPERLIPIDEMIA, UNSPECIFIED HYPERLIPIDEMIA TYPE: ICD-10-CM

## 2024-06-24 DIAGNOSIS — D50.9 IRON DEFICIENCY ANEMIA, UNSPECIFIED IRON DEFICIENCY ANEMIA TYPE: ICD-10-CM

## 2024-06-24 DIAGNOSIS — Z00.00 ENCOUNTER FOR HEALTH MAINTENANCE EXAMINATION: ICD-10-CM

## 2024-06-24 DIAGNOSIS — I10 HYPERTENSION, UNSPECIFIED TYPE: ICD-10-CM

## 2024-06-24 DIAGNOSIS — N18.31 STAGE 3A CHRONIC KIDNEY DISEASE (MULTI): ICD-10-CM

## 2024-06-24 DIAGNOSIS — Z00.00 ROUTINE GENERAL MEDICAL EXAMINATION AT HEALTH CARE FACILITY: Primary | ICD-10-CM

## 2024-06-24 LAB
25(OH)D3 SERPL-MCNC: 32 NG/ML (ref 30–100)
ALBUMIN SERPL BCP-MCNC: 4.1 G/DL (ref 3.4–5)
ALP SERPL-CCNC: 69 U/L (ref 33–136)
ALT SERPL W P-5'-P-CCNC: 12 U/L (ref 7–45)
ANION GAP SERPL CALC-SCNC: 14 MMOL/L (ref 10–20)
AST SERPL W P-5'-P-CCNC: 16 U/L (ref 9–39)
BILIRUB SERPL-MCNC: 0.7 MG/DL (ref 0–1.2)
BUN SERPL-MCNC: 11 MG/DL (ref 6–23)
CALCIUM SERPL-MCNC: 9.4 MG/DL (ref 8.6–10.3)
CHLORIDE SERPL-SCNC: 102 MMOL/L (ref 98–107)
CHOLEST SERPL-MCNC: 122 MG/DL (ref 0–199)
CHOLESTEROL/HDL RATIO: 3.7
CO2 SERPL-SCNC: 28 MMOL/L (ref 21–32)
CREAT SERPL-MCNC: 1.04 MG/DL (ref 0.5–1.05)
CREAT UR-MCNC: 155.6 MG/DL (ref 20–320)
EGFRCR SERPLBLD CKD-EPI 2021: 55 ML/MIN/1.73M*2
ERYTHROCYTE [DISTWIDTH] IN BLOOD BY AUTOMATED COUNT: 14.3 % (ref 11.5–14.5)
EST. AVERAGE GLUCOSE BLD GHB EST-MCNC: 186 MG/DL
GLUCOSE SERPL-MCNC: 152 MG/DL (ref 74–99)
HBA1C MFR BLD: 8.1 %
HCT VFR BLD AUTO: 38.5 % (ref 36–46)
HDLC SERPL-MCNC: 32.6 MG/DL
HGB BLD-MCNC: 12.2 G/DL (ref 12–16)
IRON SATN MFR SERPL: ABNORMAL %
IRON SERPL-MCNC: 60 UG/DL (ref 35–150)
LDLC SERPL CALC-MCNC: 47 MG/DL
MCH RBC QN AUTO: 26.3 PG (ref 26–34)
MCHC RBC AUTO-ENTMCNC: 31.7 G/DL (ref 32–36)
MCV RBC AUTO: 83 FL (ref 80–100)
MICROALBUMIN UR-MCNC: 39.2 MG/L
MICROALBUMIN/CREAT UR: 25.2 UG/MG CREAT
NON HDL CHOLESTEROL: 89 MG/DL (ref 0–149)
NRBC BLD-RTO: 0 /100 WBCS (ref 0–0)
PLATELET # BLD AUTO: 209 X10*3/UL (ref 150–450)
POTASSIUM SERPL-SCNC: 4.6 MMOL/L (ref 3.5–5.3)
PROT SERPL-MCNC: 6.8 G/DL (ref 6.4–8.2)
RBC # BLD AUTO: 4.63 X10*6/UL (ref 4–5.2)
SODIUM SERPL-SCNC: 139 MMOL/L (ref 136–145)
TIBC SERPL-MCNC: ABNORMAL UG/DL
TRIGL SERPL-MCNC: 213 MG/DL (ref 0–149)
TSH SERPL-ACNC: 0.82 MIU/L (ref 0.44–3.98)
UIBC SERPL-MCNC: >450 UG/DL (ref 110–370)
VIT B12 SERPL-MCNC: 376 PG/ML (ref 211–911)
VLDL: 43 MG/DL (ref 0–40)
WBC # BLD AUTO: 7.7 X10*3/UL (ref 4.4–11.3)

## 2024-06-24 PROCEDURE — 83550 IRON BINDING TEST: CPT

## 2024-06-24 PROCEDURE — 80061 LIPID PANEL: CPT

## 2024-06-24 PROCEDURE — 84443 ASSAY THYROID STIM HORMONE: CPT

## 2024-06-24 PROCEDURE — 73130 X-RAY EXAM OF HAND: CPT | Mod: LT

## 2024-06-24 PROCEDURE — 93000 ELECTROCARDIOGRAM COMPLETE: CPT | Performed by: FAMILY MEDICINE

## 2024-06-24 PROCEDURE — 85027 COMPLETE CBC AUTOMATED: CPT

## 2024-06-24 PROCEDURE — 73130 X-RAY EXAM OF HAND: CPT | Mod: LEFT SIDE | Performed by: RADIOLOGY

## 2024-06-24 PROCEDURE — 83036 HEMOGLOBIN GLYCOSYLATED A1C: CPT

## 2024-06-24 PROCEDURE — 82306 VITAMIN D 25 HYDROXY: CPT

## 2024-06-24 PROCEDURE — 99214 OFFICE O/P EST MOD 30 MIN: CPT | Performed by: FAMILY MEDICINE

## 2024-06-24 PROCEDURE — 83540 ASSAY OF IRON: CPT

## 2024-06-24 PROCEDURE — 82043 UR ALBUMIN QUANTITATIVE: CPT

## 2024-06-24 PROCEDURE — 1170F FXNL STATUS ASSESSED: CPT | Performed by: FAMILY MEDICINE

## 2024-06-24 PROCEDURE — G0439 PPPS, SUBSEQ VISIT: HCPCS | Performed by: FAMILY MEDICINE

## 2024-06-24 PROCEDURE — 82570 ASSAY OF URINE CREATININE: CPT

## 2024-06-24 PROCEDURE — 36415 COLL VENOUS BLD VENIPUNCTURE: CPT

## 2024-06-24 PROCEDURE — 3074F SYST BP LT 130 MM HG: CPT | Performed by: FAMILY MEDICINE

## 2024-06-24 PROCEDURE — 3078F DIAST BP <80 MM HG: CPT | Performed by: FAMILY MEDICINE

## 2024-06-24 PROCEDURE — 1158F ADVNC CARE PLAN TLK DOCD: CPT | Performed by: FAMILY MEDICINE

## 2024-06-24 PROCEDURE — 82607 VITAMIN B-12: CPT

## 2024-06-24 PROCEDURE — 80053 COMPREHEN METABOLIC PANEL: CPT

## 2024-06-24 PROCEDURE — 1123F ACP DISCUSS/DSCN MKR DOCD: CPT | Performed by: FAMILY MEDICINE

## 2024-06-24 RX ORDER — LEVOTHYROXINE SODIUM 50 UG/1
50 TABLET ORAL DAILY
Qty: 90 TABLET | Refills: 1 | Status: SHIPPED | OUTPATIENT
Start: 2024-06-24 | End: 2024-12-21

## 2024-06-24 RX ORDER — ATORVASTATIN CALCIUM 20 MG/1
20 TABLET, FILM COATED ORAL DAILY
Qty: 90 TABLET | Refills: 1 | Status: SHIPPED | OUTPATIENT
Start: 2024-06-24 | End: 2024-12-21

## 2024-06-24 RX ORDER — SPIRONOLACTONE 25 MG/1
25 TABLET ORAL DAILY
Qty: 90 TABLET | Refills: 1 | Status: SHIPPED | OUTPATIENT
Start: 2024-06-24 | End: 2024-12-21

## 2024-06-24 RX ORDER — FLASH GLUCOSE SENSOR
KIT MISCELLANEOUS
Qty: 6 EACH | Refills: 3 | Status: SHIPPED | OUTPATIENT
Start: 2024-06-24

## 2024-06-24 ASSESSMENT — ACTIVITIES OF DAILY LIVING (ADL)
TAKING_MEDICATION: INDEPENDENT
DOING_HOUSEWORK: INDEPENDENT
MANAGING_FINANCES: INDEPENDENT
BATHING: INDEPENDENT
GROCERY_SHOPPING: INDEPENDENT
DRESSING: INDEPENDENT

## 2024-06-24 ASSESSMENT — ENCOUNTER SYMPTOMS
OCCASIONAL FEELINGS OF UNSTEADINESS: 0
LOSS OF SENSATION IN FEET: 0
DEPRESSION: 0

## 2024-06-24 ASSESSMENT — PATIENT HEALTH QUESTIONNAIRE - PHQ9
1. LITTLE INTEREST OR PLEASURE IN DOING THINGS: NOT AT ALL
SUM OF ALL RESPONSES TO PHQ9 QUESTIONS 1 AND 2: 0
2. FEELING DOWN, DEPRESSED OR HOPELESS: NOT AT ALL

## 2024-06-24 NOTE — PROGRESS NOTES
Subjective   Reason for Visit: Marissa Rebolledo is an 78 y.o. female here for a Medicare Wellness visit.     HPI  Patient comes in today for Medicare wellness exam.  He has some trouble getting her blood pressure as well as her heart rate.  The manual cuff however provided good blood pressure and her heart rate was borderline tachycardic and irregular at 100.  An EKG today did confirm atrial fibrillation.    Patient having issues controlling her blood sugar.  Her hemoglobin A1c was 8.5 back in January.  I recommended that with her weight gain and other comorbidities with her heart, thyroid and blood pressure that she use the continuous glucose monitor freestyle nickie 2.    Patient also complaining about soreness of her left wrist and thumb.      Patient Care Team:  Chris Contreras DO as PCP - General  Chris Contreras DO as PCP - [facility] Attributed Provider  Hank Tucker MD as Consulting Physician (Cardiology)  Abhilash Vieira MD as Consulting Physician (Cardiology)     Review of Systems   All other systems reviewed and are negative.      Objective   Vitals:  /78   Pulse 100   Temp 36.2 °C (97.1 °F)   Resp 16   Wt 73 kg (161 lb)   LMP  (LMP Unknown)   SpO2 98%   BMI 29.45 kg/m²       Physical Exam  Vitals reviewed.   Constitutional:       Appearance: She is well-developed.   HENT:      Head: Normocephalic and atraumatic.      Right Ear: Tympanic membrane, ear canal and external ear normal.      Left Ear: Tympanic membrane, ear canal and external ear normal.      Nose: Nose normal.      Mouth/Throat:      Mouth: Mucous membranes are moist.      Pharynx: Oropharynx is clear.   Eyes:      Extraocular Movements: Extraocular movements intact.      Conjunctiva/sclera: Conjunctivae normal.      Pupils: Pupils are equal, round, and reactive to light.   Cardiovascular:      Rate and Rhythm: Tachycardia present. Rhythm irregular.      Heart sounds: Normal heart sounds. No murmur heard.  Pulmonary:       Effort: Pulmonary effort is normal.      Breath sounds: Normal breath sounds. No wheezing.   Abdominal:      General: Abdomen is flat. Bowel sounds are normal.      Palpations: Abdomen is soft.   Musculoskeletal:         General: Tenderness (Pain left wrist and base of left thumb) present. Normal range of motion.   Skin:     General: Skin is warm and dry.   Neurological:      General: No focal deficit present.      Mental Status: She is alert and oriented to person, place, and time. Mental status is at baseline.   Psychiatric:         Mood and Affect: Mood normal.         Behavior: Behavior normal.         Thought Content: Thought content normal.         Judgment: Judgment normal.         Assessment/Plan   Problem List Items Addressed This Visit       B12 deficiency    Relevant Orders    CBC    Vitamin B12    CKD (chronic kidney disease)    Diabetes mellitus, type 2 (Multi)    Overview     We will be checking hemoglobin A1c today         Relevant Medications    flash glucose sensor kit (FreeStyle Aneesh 2 Sensor) kit    Other Relevant Orders    Hemoglobin A1C    Albumin , Urine Random    Hyperlipidemia    Relevant Medications    atorvastatin (Lipitor) 20 mg tablet    Other Relevant Orders    Comprehensive Metabolic Panel    Lipid Panel    Hypertension    Relevant Medications    spironolactone (Aldactone) 25 mg tablet    Iron deficiency anemia    Relevant Orders    Iron and TIBC    Vitamin D deficiency    Relevant Orders    Vitamin D 25-Hydroxy,Total (for eval of Vitamin D levels)    Hypothyroidism    Relevant Medications    levothyroxine (Synthroid, Levoxyl) 50 mcg tablet     Other Visit Diagnoses       Routine general medical examination at health care facility    -  Primary    Atrial fibrillation, unspecified type (Multi)        Relevant Orders    TSH with reflex to Free T4 if abnormal    Encounter for health maintenance examination        Hand pain, left        Relevant Orders    XR hand left 3+ views        Will  contact patient with x-ray results when they are available.  Will contact patient with lab results when available.  Use continuous glucose monitor and check sugars at least 3 times a day.  Follow-up with cardiology regarding the atrial fibrillation.  Medication list reconciled.  Thank you for visiting today!      Professional services: Some of this note was completed using Dragon voice technology and sometimes the software misinterprets words. This may include unintended errors with respect to translation of words, typographical errors or grammar errors which may not have been identified prior to finalization of the chart note. Please take this into account when reading the note. Thank you.

## 2024-06-24 NOTE — PROGRESS NOTES
Subjective   Reason for Visit: Marissa Rebolledo is an 78 y.o. female here for a Medicare Wellness visit.               HPI    Patient Care Team:  Chris Contreras DO as PCP - General  Chris Cont[facility] DO [facility]  Hank Tucker MD as Consulting Physician (Cardiology)  Abhilash Vieira MD as Consulting Physician (Cardiology)     Review of Systems    Objective   Vitals:  Pulse 85   Temp 36.2 °C (97.1 °F)   Resp 16   Wt 73 kg (161 lb)   LMP  (LMP Unknown)   SpO2 98%   BMI 29.45 kg/m²       Physical Exam    Assessment/Plan   Problem List Items Addressed This Visit    None

## 2024-06-26 PROBLEM — I50.20 SYSTOLIC CHF (MULTI): Status: RESOLVED | Noted: 2023-03-10 | Resolved: 2024-06-26

## 2024-06-26 NOTE — RESULT ENCOUNTER NOTE
Champ Ann,    The x-ray of your left hand shows moderate to severe arthritis.  Unfortunately because of your Eliquis the only thing you can really take for your arthritis is Tylenol 1000 mg up to 3 times a day.  I would try this for a few weeks and if it is not working we will need to have you follow-up with the rheumatologist to see what else they may recommend.  Let me know.      Have a Great Day!!!    Dr. Contreras

## 2024-06-26 NOTE — RESULT ENCOUNTER NOTE
Champ Ann,    Your sugar is still on the high side at 8.1.  Would increase the glipizide to 5 mg 2 pills 2 times a day.  Watch sugar, carbs and starches in your diet and will recheck your HbA1c in October 2024.    Your iron level is low normal at 60.  We would like to see the iron level 60 or more. Would recommend over-the-counter ferrous sulfate 325 mg once a day and recheck in October 2024.      Your vitamin D level is low normal at 32.  It should be between 30 and 100 the closer to 50 the better. It is an important vitamin for your heart, lungs, immune system and bones among other things in your body. Would recommend over the counter vitamin D3 2000 units daily to get it into and keep it in the normal range and recheck in October 2024.     Your B12 level was low normal at 376.  Vitamin B-12 level should be above 400. If less than 400 can have both neurologic and/or psychological symptoms.  Would recommend over-the-counter vitamin B-12  1000 units daily to keep the level above 400. Recheck in October 2024.    Your thyroid level looks okay.  Continue current levothyroxine 50 mcg dose.      At this point would recommend that you follow-up with Dr. Tucker regarding your atrial fibrillation.    Have a Great Day!!!    Dr. Contreras

## 2024-06-27 ENCOUNTER — HOSPITAL ENCOUNTER (OUTPATIENT)
Dept: CARDIOLOGY | Facility: HOSPITAL | Age: 79
Discharge: HOME | End: 2024-06-27
Payer: MEDICARE

## 2024-06-27 ENCOUNTER — HOSPITAL ENCOUNTER (EMERGENCY)
Facility: HOSPITAL | Age: 79
Discharge: HOME | End: 2024-06-27
Attending: STUDENT IN AN ORGANIZED HEALTH CARE EDUCATION/TRAINING PROGRAM
Payer: MEDICARE

## 2024-06-27 ENCOUNTER — APPOINTMENT (OUTPATIENT)
Dept: RADIOLOGY | Facility: HOSPITAL | Age: 79
End: 2024-06-27
Payer: MEDICARE

## 2024-06-27 ENCOUNTER — APPOINTMENT (OUTPATIENT)
Dept: CARDIOLOGY | Facility: HOSPITAL | Age: 79
End: 2024-06-27
Payer: MEDICARE

## 2024-06-27 VITALS
RESPIRATION RATE: 16 BRPM | SYSTOLIC BLOOD PRESSURE: 163 MMHG | HEIGHT: 62 IN | TEMPERATURE: 98.2 F | WEIGHT: 161 LBS | BODY MASS INDEX: 29.63 KG/M2 | DIASTOLIC BLOOD PRESSURE: 108 MMHG | HEART RATE: 107 BPM | OXYGEN SATURATION: 95 %

## 2024-06-27 DIAGNOSIS — I48.91 ATRIAL FIBRILLATION WITH RVR (MULTI): ICD-10-CM

## 2024-06-27 DIAGNOSIS — I10 HYPERTENSION, UNSPECIFIED TYPE: ICD-10-CM

## 2024-06-27 DIAGNOSIS — I48.0 PAROXYSMAL ATRIAL FIBRILLATION (MULTI): Primary | ICD-10-CM

## 2024-06-27 LAB
ALBUMIN SERPL BCP-MCNC: 4.3 G/DL (ref 3.4–5)
ALP SERPL-CCNC: 68 U/L (ref 33–136)
ALT SERPL W P-5'-P-CCNC: 11 U/L (ref 7–45)
ANION GAP SERPL CALC-SCNC: 14 MMOL/L (ref 10–20)
AST SERPL W P-5'-P-CCNC: 37 U/L (ref 9–39)
BASOPHILS # BLD AUTO: 0.06 X10*3/UL (ref 0–0.1)
BASOPHILS NFR BLD AUTO: 0.7 %
BILIRUB SERPL-MCNC: 0.4 MG/DL (ref 0–1.2)
BNP SERPL-MCNC: 321 PG/ML (ref 0–99)
BUN SERPL-MCNC: 12 MG/DL (ref 6–23)
CALCIUM SERPL-MCNC: 9.4 MG/DL (ref 8.6–10.3)
CARDIAC TROPONIN I PNL SERPL HS: 7 NG/L (ref 0–13)
CHLORIDE SERPL-SCNC: 102 MMOL/L (ref 98–107)
CO2 SERPL-SCNC: 26 MMOL/L (ref 21–32)
CREAT SERPL-MCNC: 0.91 MG/DL (ref 0.5–1.05)
EGFRCR SERPLBLD CKD-EPI 2021: 65 ML/MIN/1.73M*2
EOSINOPHIL # BLD AUTO: 0.23 X10*3/UL (ref 0–0.4)
EOSINOPHIL NFR BLD AUTO: 2.8 %
ERYTHROCYTE [DISTWIDTH] IN BLOOD BY AUTOMATED COUNT: 14.2 % (ref 11.5–14.5)
GLUCOSE SERPL-MCNC: 133 MG/DL (ref 74–99)
HCT VFR BLD AUTO: 40 % (ref 36–46)
HGB BLD-MCNC: 12.7 G/DL (ref 12–16)
IMM GRANULOCYTES # BLD AUTO: 0.03 X10*3/UL (ref 0–0.5)
IMM GRANULOCYTES NFR BLD AUTO: 0.4 % (ref 0–0.9)
INR PPP: 1.5 (ref 0.9–1.1)
LYMPHOCYTES # BLD AUTO: 2.46 X10*3/UL (ref 0.8–3)
LYMPHOCYTES NFR BLD AUTO: 29.7 %
MAGNESIUM SERPL-MCNC: 1.71 MG/DL (ref 1.6–2.4)
MCH RBC QN AUTO: 26.6 PG (ref 26–34)
MCHC RBC AUTO-ENTMCNC: 31.8 G/DL (ref 32–36)
MCV RBC AUTO: 84 FL (ref 80–100)
MONOCYTES # BLD AUTO: 0.85 X10*3/UL (ref 0.05–0.8)
MONOCYTES NFR BLD AUTO: 10.3 %
NEUTROPHILS # BLD AUTO: 4.64 X10*3/UL (ref 1.6–5.5)
NEUTROPHILS NFR BLD AUTO: 56.1 %
NRBC BLD-RTO: 0 /100 WBCS (ref 0–0)
PLATELET # BLD AUTO: 189 X10*3/UL (ref 150–450)
POTASSIUM SERPL-SCNC: 4.3 MMOL/L (ref 3.5–5.3)
POTASSIUM SERPL-SCNC: 5.7 MMOL/L (ref 3.5–5.3)
PROT SERPL-MCNC: 7.6 G/DL (ref 6.4–8.2)
PROTHROMBIN TIME: 16.6 SECONDS (ref 9.8–12.8)
RBC # BLD AUTO: 4.78 X10*6/UL (ref 4–5.2)
SODIUM SERPL-SCNC: 136 MMOL/L (ref 136–145)
TSH SERPL-ACNC: 0.78 MIU/L (ref 0.44–3.98)
WBC # BLD AUTO: 8.3 X10*3/UL (ref 4.4–11.3)

## 2024-06-27 PROCEDURE — 71046 X-RAY EXAM CHEST 2 VIEWS: CPT | Performed by: RADIOLOGY

## 2024-06-27 PROCEDURE — 83735 ASSAY OF MAGNESIUM: CPT | Performed by: NURSE PRACTITIONER

## 2024-06-27 PROCEDURE — 71046 X-RAY EXAM CHEST 2 VIEWS: CPT

## 2024-06-27 PROCEDURE — 85025 COMPLETE CBC W/AUTO DIFF WBC: CPT | Performed by: NURSE PRACTITIONER

## 2024-06-27 PROCEDURE — 80053 COMPREHEN METABOLIC PANEL: CPT | Performed by: NURSE PRACTITIONER

## 2024-06-27 PROCEDURE — 83880 ASSAY OF NATRIURETIC PEPTIDE: CPT | Performed by: NURSE PRACTITIONER

## 2024-06-27 PROCEDURE — 84484 ASSAY OF TROPONIN QUANT: CPT | Performed by: NURSE PRACTITIONER

## 2024-06-27 PROCEDURE — 36415 COLL VENOUS BLD VENIPUNCTURE: CPT

## 2024-06-27 PROCEDURE — 2500000005 HC RX 250 GENERAL PHARMACY W/O HCPCS

## 2024-06-27 PROCEDURE — 84132 ASSAY OF SERUM POTASSIUM: CPT

## 2024-06-27 PROCEDURE — 93005 ELECTROCARDIOGRAM TRACING: CPT

## 2024-06-27 PROCEDURE — 99284 EMERGENCY DEPT VISIT MOD MDM: CPT | Mod: 25

## 2024-06-27 PROCEDURE — 85610 PROTHROMBIN TIME: CPT | Performed by: NURSE PRACTITIONER

## 2024-06-27 PROCEDURE — 84443 ASSAY THYROID STIM HORMONE: CPT

## 2024-06-27 PROCEDURE — 36415 COLL VENOUS BLD VENIPUNCTURE: CPT | Performed by: NURSE PRACTITIONER

## 2024-06-27 PROCEDURE — 96374 THER/PROPH/DIAG INJ IV PUSH: CPT

## 2024-06-27 RX ORDER — METOPROLOL SUCCINATE 100 MG/1
100 TABLET, EXTENDED RELEASE ORAL EVERY 12 HOURS
Qty: 60 TABLET | Refills: 0 | Status: SHIPPED | OUTPATIENT
Start: 2024-06-27 | End: 2024-07-27

## 2024-06-27 RX ORDER — METOPROLOL TARTRATE 1 MG/ML
5 INJECTION, SOLUTION INTRAVENOUS ONCE
Status: COMPLETED | OUTPATIENT
Start: 2024-06-27 | End: 2024-06-27

## 2024-06-27 ASSESSMENT — PAIN SCALES - GENERAL: PAINLEVEL_OUTOF10: 0 - NO PAIN

## 2024-06-27 ASSESSMENT — COLUMBIA-SUICIDE SEVERITY RATING SCALE - C-SSRS
2. HAVE YOU ACTUALLY HAD ANY THOUGHTS OF KILLING YOURSELF?: NO
1. IN THE PAST MONTH, HAVE YOU WISHED YOU WERE DEAD OR WISHED YOU COULD GO TO SLEEP AND NOT WAKE UP?: NO
6. HAVE YOU EVER DONE ANYTHING, STARTED TO DO ANYTHING, OR PREPARED TO DO ANYTHING TO END YOUR LIFE?: NO

## 2024-06-27 NOTE — ED TRIAGE NOTES
TRIAGE NOTE   I saw the patient as the Clinician in Triage and performed a brief history and physical exam, established acuity, and ordered appropriate tests to develop basic plan of care. Patient will be seen by an HIRO, resident and/or physician who will independently evaluate the patient. Please see subsequent provider notes for further details and disposition.     Brief HPI: In brief, Marissa Rebolledo is a 78 y.o. female with significant past medical history for hypothyroidism, HTN, HLD, GERD, type 2 diabetes, CHF, CKD, A-fib on Eliquis and anxiety presenting to ED today from home by herself for evaluation of A-fib.  Patient states she has been tired and fatigued lately, she saw PCP on Monday for a physical and was found to be back in A-fib.  Appointment with cardiologist is scheduled for 7/16.  Today the patient noticed her heart rate ranged from 44-1 03.  She also had elevated blood pressures of 170/113 at home.  Denies fever/chills, cough/cold symptoms, chest pain, shortness of breath, nausea/vomiting, abdominal pain, urinary symptoms, change in bowel habits or any other complaints.  No smoking, EtOH or drug use.  PCP is Dr. Contreras, cardiologist is Dr. Tucker.     Focused Physical exam:   General: Pleasant elderly  female, awake and alert, oriented x 3.  Well-nourished, nontoxic looking.  Skin: Pink, warm and dry.  Cardiac: Irregularly irregular rhythm.  Pulmonary: Lungs clear bilaterally.  Abdomen: Round and soft with bowel sounds, nontender.    Plan/MDM:   78 y.o. female with significant past medical history for hypothyroidism, HTN, HLD, GERD, type 2 diabetes, CHF, CKD, A-fib on Eliquis and anxiety is evaluated at the bedside for A-fib ranging from 44-1 03 today and elevated blood pressures at home.  Patient states she has been extremely fatigued since going back in A-fib on Monday.  On arrival to the ED, blood pressure 149/100, heart rate 111, patient is in A-fib per EKG.  Patient is not hypoxic or  febrile.  Lungs clear, abdomen soft and nontender.  No chest pain or shortness of breath.  IV established, basic labs and chest x-ray will be performed.  Patient is agreeable to this plan.    Please see subsequent provider note for further details and disposition

## 2024-06-28 LAB
ATRIAL RATE: 0 BPM
ATRIAL RATE: 107 BPM
PR INTERVAL: 156 MS
PR INTERVAL: 186 MS
Q ONSET: 249 MS
Q ONSET: 253 MS
QRS COUNT: 20 BEATS
QRS COUNT: 20 BEATS
QRS DURATION: 83 MS
QRS DURATION: 88 MS
QT INTERVAL: 339 MS
QT INTERVAL: 339 MS
QTC CALCULATION(BAZETT): 487 MS
QTC CALCULATION(BAZETT): 501 MS
QTC FREDERICIA: 431 MS
QTC FREDERICIA: 440 MS
R AXIS: -12 DEGREES
R AXIS: 7 DEGREES
T AXIS: 78 DEGREES
T OFFSET: 419 MS
T OFFSET: 422 MS
VENTRICULAR RATE: 124 BPM
VENTRICULAR RATE: 131 BPM

## 2024-06-28 NOTE — DISCHARGE INSTRUCTIONS
You came to the emergency department for atrial fibrillation.  I talked to cardiology and we changed your metoprolol dose to 100 mg twice a day, please take at 9 in the morning and 9 PM before bed.  If your blood pressure is below 110 systolic at night or if your heart rate is below 80 at night, please hold your nighttime dose to avoid dropping your heart rate or blood pressure too low.  If you develop worsening chest pain, syncopal episodes, palpitations, worsening fatigue, shortness of breath please return to the emergency department.  Please follow-up with cardiology as scheduled. Please follow up with your primary care doctor to discuss ED visit.

## 2024-06-28 NOTE — ED PROVIDER NOTES
CC: Hypertension     History provided by: Patient  Limitations to History: None    HPI:  Patient is 78-year-old female with history of paroxysmal atrial fibrillation on Eliquis, cardiomyopathy with LVEF of 40%, type 2 diabetes, CKD, hypertension, hypothyroidism, iron deficiency anemia who presents with concern for atrial fibrillation.  States she is taking all of her home medications.  She states she is here today because her blood pressure was high at home approximately 170 systolic and her heart rate was high at around 103 and has been fluctuating between being high and low all day.  She states she is normally in atrial fibrillation and she feels fatigued but denies any falls, dizziness, chest pain.  She does note some exertional shortness of breath when walking up the stairs.  She denies any bleeding, abdominal pain, flank pain, back pain, nausea, vomiting.  She states she has cardiology follow-up scheduled July 16, 2024.  She states she has never been cardioverted in the past however she has had appointment scheduled for cardioversion.    Patient saw her PCP on June 24, 2024 and I reviewed outpatient note for patient was told to follow-up with cardiology due to atrial fibrillation, left hand x-ray obtained for chronic pain.  Additionally, I reviewed cardiology note from February 2024 when patient was evaluated for atrial fibrillation and had a elective cardioversion scheduled however on arrival to appointment patient was found to be in normal sinus rhythm with PVCs and cardioversion was canceled.  I reviewed home medications with include Eliquis, metoprolol succinate, spironolactone, Synthroid, venlafaxine.    External Records Reviewed:  I reviewed prior ED visits, Care Everywhere, discharge summaries and outpatient records as appropriate.   ???????????????????????????????????????????????????????????????  Triage Vitals:  T 36.8 °C (98.2 °F)  HR (!) 111  BP (!) 149/100  RR 18  O2 98 %      Physical  Exam  Vitals and nursing note reviewed.   Constitutional:       General: She is not in acute distress.     Appearance: Normal appearance.   HENT:      Head: Normocephalic and atraumatic.   Eyes:      Conjunctiva/sclera: Conjunctivae normal.   Cardiovascular:      Rate and Rhythm: Tachycardia present. Rhythm irregular.   Pulmonary:      Effort: Pulmonary effort is normal. No respiratory distress.      Breath sounds: Normal breath sounds.   Abdominal:      General: Abdomen is flat. There is no distension.      Palpations: Abdomen is soft.      Tenderness: There is no abdominal tenderness.   Musculoskeletal:         General: Normal range of motion.      Cervical back: Normal range of motion and neck supple.   Skin:     General: Skin is warm and dry.   Neurological:      General: No focal deficit present.      Mental Status: She is alert and oriented to person, place, and time. Mental status is at baseline.   Psychiatric:         Mood and Affect: Mood normal.         Behavior: Behavior normal.        ???????????????????????????????????????????????????????????????  ED Course/Treatment/Medical Decision Making  MDM:  Patient is a 78-year-old female who presents with atrial fibrillation, fatigue.  Vital signs notable for A-fib rate low 100s.  She is in no acute respiratory distress.  I reviewed provider in triage workup as stated below which is overall unremarkable.  She does not look fluid overloaded clinically.  No significant pitting edema, lungs are clear to auscultation bilaterally, no significant interstitial edema on chest x-ray.  I do not believe she needs diuresis at this time.  Patient does have a history of paroxysmal atrial fibrillation and heart rate was around 100 at PCP visit 3 days ago as well, I believe this is similar to her baseline.  She is already on metoprolol at home.  She is continuing appropriately.      ED Course:  ED Course as of 06/27/24 2343   Thu Jun 27, 2024 2006 EKG reviewed atrial  fibrillation rate 124, QRS 88 ms, QTc 487 seconds, no acute ST segment elevations or depressions, both prolonged QTc and A-fib seen on prior EKG from 2/2024 [SA]   2006 Chest x-ray independently reviewed without obvious cardiopulmonary abnormality including consolidation, interstitial edema, effusions, pneumothorax [SA]   2007 Provider in triage workup reviewed with troponin within normal limits at 7, BNP slightly elevated at 321 however improved from prior, CMP with slight hyperkalemia 5.7 which appears hemolyzed otherwise within normal limits, CBC within normal limits [SA]   2107 Interpreted by the Emergency Department Attending: ECG revealed atrial fibrillation RVR at a rate of 124 beats per minute with RI interval 186 , QRS of 88 , QTc of 487.  No acute injury pattern. Previous EKG on February 12 revealed A-fib replacing normal sinus changes.    [MG]   2129 Interpreted by the Emergency Department Attending: ECG revealed atrial fibrillation RVR at a rate of 131 beats per minute with RI interval * , QRS of 83 , QTc of 501.  No acute injury pattern.  No significant change. [MG]   2131 Patient persistently in A-fib with RVR now rate 130s, 5 mg of IV metoprolol given [SA]   2153 Discussed case with cardiology and he states we can discharge patient with 100 mg of metoprolol succinate twice daily for better rate control until follow-up with cardiology [SA]   2224 Vital signs after IV metoprolol improved, she remains in atrial fibrillation with heart rate between  however is more persistently around 90s.  She notes improvement in symptoms.  I discussed medication titration with patient and she verbalized understanding and need for cardiology follow-up. [SA]      ED Course User Index  [MG] Chris Jolly DO  [SA] Juanita Tyler DO         Diagnoses as of 06/27/24 2343   Paroxysmal atrial fibrillation (Multi)   Atrial fibrillation with RVR (Multi)       Scoring Tools Utilized: None    EKG  Interpretation:  See ED Course/Below:    Independent Interpretation of Studies:  I independently interpreted labs/imaging as stated in ED Course or below.    Differential diagnoses considered include but are not limited to: See MDM/Below:    Social Determinants Limiting Care:  None identified The following actions were taken to address these social determinants: None    Discussion of Management with Other Providers: See MDM/Below:    Disposition:  Discussed differential and workup results including pertinent labs/imaging and any incidental findings if applicable. Patient will follow-up with the primary physician in the next 2-3 days. Return if worse. They understand return precautions and discharge instructions. Patient and family/friend/caregiver are in agreement with this plan.       AMAN Ford, PGY-2    I reviewed the case with the attending ED physician. The attending ED physician agrees with the plan. Patient and/or patient´s representative was counseled regarding labs, imaging, likely diagnosis, and plan. All questions were answered.    Disclaimer: This note was dictated by speech recognition.  Attempt at proofreading was made to minimize errors.  Errors in transcription may be present.  Please call if questions.    Procedures ? [employer] last updated 6/27/2024 11:43 PM        Juanita Tyler DO  Resident  06/27/24 1365      I did evaluate the patient independently from the resident and was present for key medical decision making.  Agree with disposition.  Any discrepancies between residents findings are detailed below.    In short this is a 78-year-old female presenting to the emergency department for concerns of tachycardia.  She does have known atrial fibrillation which is paroxysmal she has been compliant with her metoprolol and Eliquis.  She does have already scheduled cardiac follow-up this coming July with her cardiologist.  Due to persistent fatigue shortness of breath with climbing  stairs she is presenting today for further evaluation.    VS: As documented in the triage note from today's date and EMR flowsheet were reviewed.  Gen: Well developed. No acute distress. Seated in bed. Appears nontoxic.   Skin: Warm. Dry. Intact. No rashes or lesions.  Eyes: Pupils equally round and reactive to light. Clear sclera.   HENT: Atraumatic appearance. Mucosal membranes moist. No oral lesions, uvula midline, airway patent.   CV: Tachycardic rate and irregular rhythm. S1, S2. No pedal edema. Warm extremities.  Resp: Nonlabored breathing Clear to auscultation bilaterally. No increased work of breathing.   GI: Soft and nontender. No rebound or guarding. Bowel sounds x4 present.   MSK: Symmetric muscle bulk. No joint swelling in the extremities. Compartments are soft. Neurovascularly intact x4 extremities. Radial pulses +2 equal bilaterally.  Pedal pulses +2 equal bilaterally.  Neuro: Alert. Speech fluent. Moving all extremities. No focal deficits. Gait normal.  Psych: Appropriate. Kempt.    Patient arrives hypertensive recommended follow-up with primary physician for repeat checks.  She is in A-fib RVR lab work is grossly unremarkable no significant electrolyte derangements renal function is appropriate.  Cardiac troponin within normal range no acute injury pattern on EKG doubt atypical ACS.  TSH in normal range as well.  No signs of anemia chest x-ray is clear.  Patient was given Lopressor for rate control heart rate did come down she is currently asymptomatic after treatment.  The resident did speak with cardiology on-call who recommended increasing her metoprolol dose as above.  This was performed she is aware that she needs to follow-up with her cardiologist and primary physician as soon as possible.  She is appreciative of care she is agreeable with plan for discharge home with close follow-up and medication adjustments.    Chris Jolly, DO Chris Jolly,   06/27/24 [id number]

## 2024-07-02 ENCOUNTER — TELEPHONE (OUTPATIENT)
Dept: PRIMARY CARE | Facility: CLINIC | Age: 79
End: 2024-07-02
Payer: MEDICARE

## 2024-07-04 LAB
ATRIAL RATE: 107 BPM
PR INTERVAL: 156 MS
Q ONSET: 249 MS
QRS COUNT: 20 BEATS
QRS DURATION: 83 MS
QT INTERVAL: 339 MS
QTC CALCULATION(BAZETT): 501 MS
QTC FREDERICIA: 440 MS
R AXIS: 7 DEGREES
T OFFSET: 419 MS
VENTRICULAR RATE: 131 BPM

## 2024-07-05 LAB
ATRIAL RATE: 0 BPM
PR INTERVAL: 186 MS
Q ONSET: 253 MS
QRS COUNT: 20 BEATS
QRS DURATION: 88 MS
QT INTERVAL: 339 MS
QTC CALCULATION(BAZETT): 487 MS
QTC FREDERICIA: 431 MS
R AXIS: -12 DEGREES
T AXIS: 78 DEGREES
T OFFSET: 422 MS
VENTRICULAR RATE: 124 BPM

## 2024-07-16 ENCOUNTER — APPOINTMENT (OUTPATIENT)
Dept: CARDIOLOGY | Facility: CLINIC | Age: 79
End: 2024-07-16
Payer: MEDICARE

## 2024-07-16 ENCOUNTER — HOSPITAL ENCOUNTER (OUTPATIENT)
Facility: HOSPITAL | Age: 79
Setting detail: OUTPATIENT SURGERY
End: 2024-07-16
Attending: STUDENT IN AN ORGANIZED HEALTH CARE EDUCATION/TRAINING PROGRAM | Admitting: STUDENT IN AN ORGANIZED HEALTH CARE EDUCATION/TRAINING PROGRAM
Payer: MEDICARE

## 2024-07-16 VITALS
BODY MASS INDEX: 30.18 KG/M2 | DIASTOLIC BLOOD PRESSURE: 82 MMHG | WEIGHT: 164 LBS | OXYGEN SATURATION: 98 % | HEIGHT: 62 IN | SYSTOLIC BLOOD PRESSURE: 140 MMHG | HEART RATE: 125 BPM

## 2024-07-16 DIAGNOSIS — I48.91 ATRIAL FIBRILLATION, UNSPECIFIED TYPE (MULTI): ICD-10-CM

## 2024-07-16 DIAGNOSIS — I50.22 CHRONIC SYSTOLIC (CONGESTIVE) HEART FAILURE (MULTI): ICD-10-CM

## 2024-07-16 DIAGNOSIS — E78.5 HYPERLIPIDEMIA, UNSPECIFIED HYPERLIPIDEMIA TYPE: ICD-10-CM

## 2024-07-16 DIAGNOSIS — I10 PRIMARY HYPERTENSION: Primary | ICD-10-CM

## 2024-07-16 DIAGNOSIS — R06.02 SHORTNESS OF BREATH ON EXERTION: ICD-10-CM

## 2024-07-16 PROBLEM — N18.9 CKD (CHRONIC KIDNEY DISEASE): Status: RESOLVED | Noted: 2023-03-10 | Resolved: 2024-07-16

## 2024-07-16 PROBLEM — N18.31 STAGE 3A CHRONIC KIDNEY DISEASE (MULTI): Status: RESOLVED | Noted: 2024-02-03 | Resolved: 2024-07-16

## 2024-07-16 PROCEDURE — 1036F TOBACCO NON-USER: CPT | Performed by: STUDENT IN AN ORGANIZED HEALTH CARE EDUCATION/TRAINING PROGRAM

## 2024-07-16 PROCEDURE — 1159F MED LIST DOCD IN RCRD: CPT | Performed by: STUDENT IN AN ORGANIZED HEALTH CARE EDUCATION/TRAINING PROGRAM

## 2024-07-16 PROCEDURE — 99215 OFFICE O/P EST HI 40 MIN: CPT | Performed by: STUDENT IN AN ORGANIZED HEALTH CARE EDUCATION/TRAINING PROGRAM

## 2024-07-16 PROCEDURE — 3079F DIAST BP 80-89 MM HG: CPT | Performed by: STUDENT IN AN ORGANIZED HEALTH CARE EDUCATION/TRAINING PROGRAM

## 2024-07-16 PROCEDURE — 1123F ACP DISCUSS/DSCN MKR DOCD: CPT | Performed by: STUDENT IN AN ORGANIZED HEALTH CARE EDUCATION/TRAINING PROGRAM

## 2024-07-16 PROCEDURE — 3077F SYST BP >= 140 MM HG: CPT | Performed by: STUDENT IN AN ORGANIZED HEALTH CARE EDUCATION/TRAINING PROGRAM

## 2024-07-16 PROCEDURE — 93000 ELECTROCARDIOGRAM COMPLETE: CPT | Performed by: STUDENT IN AN ORGANIZED HEALTH CARE EDUCATION/TRAINING PROGRAM

## 2024-07-16 RX ORDER — AMIODARONE HYDROCHLORIDE 200 MG/1
200 TABLET ORAL DAILY
Qty: 30 TABLET | Refills: 5 | Status: SHIPPED | OUTPATIENT
Start: 2024-07-16 | End: 2025-01-12

## 2024-07-16 NOTE — PROGRESS NOTES
"    Cardiac Electrophysiology Office Visit     Referred by Chris Redd DO for   Chief Complaint   Patient presents with    Follow-up    Atrial Fibrillation      HPI:  Marissa Rebolledo is a 78 y.o. year old female patient with h/o COVID (Dec 2021 - Hospitalization), New DX of DM, CHF (HFrEF - 40%), atrial fibrillation  presenting today for follow up    PMHx/PSHx: As above  Tobacco quit 40 years - 1ppd for 10 years , alcohol - denies, caffeine use 1-2 cups/coffee day , drug use - denies  FamHx: Father \" Heart disease\"; Mother - D@92, no heart disease, Sister - Recent Dx of CHF, Brother - PPM; Son - D@50 2/2 Throat CA.    Objective  Allergies   Allergen Reactions    Metformin Headache and GI Upset     Abdominal pain    Mirtazapine Hives    Rosuvastatin Myalgia     Muscle spasm/weakness    Simvastatin Myalgia     Muscle spasms/weakness  Leg cramps     Sulfamethoxazole-Trimethoprim Hives    Jardiance [Empagliflozin] Other    Alendronic Acid Other    Clarithromycin Other     weak    Fosamax [Alendronate] Other     weak    Nitrofurantoin Monohyd/M-Cryst Other     weak    Ofloxacin Other     weakness        Current Outpatient Medications   Medication Instructions    albuterol 90 mcg/actuation inhaler 1-2 puffs, inhalation, Every 6 hours PRN    apixaban (ELIQUIS) 5 mg, oral, 2 times daily    atorvastatin (LIPITOR) 20 mg, oral, Daily    azelastine (Astelin) 137 mcg (0.1 %) nasal spray 1 spray, Does not apply, 2 times daily    esomeprazole (NEXIUM) 40 mg, oral, Daily before breakfast    flash glucose sensor kit (FreeStyle Aneesh 2 Sensor) kit Use as instructed 1 device every 14 days    FreeStyle Aneesh reader (FreeStyle Aneesh 2 Tyner) misc Use as instructed    glipiZIDE (GLUCOTROL) 5 mg, oral, 2 times daily before meals    insulin lispro (HumaLOG KwikPen Insulin) 100 unit/mL injection Inject 3 times daily using sliding scale: 0-150 no units, 151-200 2 units, 201-250 4 units, 251-300 6 units, 301-350 8 units, 351-400 " "10 units, above 400 call Dr. Ben Bishop 30 units daily    levothyroxine (SYNTHROID, LEVOXYL) 50 mcg, oral, Daily    lisinopril 40 mg, oral, Daily    metFORMIN XR (Glucophage-XR) 500 mg 24 hr tablet 2 tablets, oral, 2 times daily (morning and late afternoon)    metoprolol succinate XL (TOPROL-XL) 100 mg, oral, Every 12 hours, Please monitor your heart rate and blood pressure before taking second dose. Avoid taking if your blood pressure or heart rate are too low    spironolactone (ALDACTONE) 25 mg, oral, Daily    venlafaxine XR (EFFEXOR-XR) 150 mg, oral, Nightly         Visit Vitals  /82 (BP Location: Right arm, Patient Position: Sitting, BP Cuff Size: Adult)   Pulse (!) 125   Ht 1.575 m (5' 2\")   Wt 74.4 kg (164 lb)   LMP  (LMP Unknown)   SpO2 98%   BMI 30.00 kg/m²   OB Status Postmenopausal   Smoking Status Former   BSA 1.8 m²        Physical Exam  Constitutional:       Appearance: Normal appearance.   HENT:      Head: Normocephalic.   Cardiovascular:      Rate and Rhythm: Normal rate. Rhythm irregular.      Pulses: Normal pulses.   Pulmonary:      Effort: No respiratory distress.      Breath sounds: No wheezing.   Skin:     General: Skin is warm and dry.      Capillary Refill: Capillary refill takes less than 2 seconds.   Neurological:      Mental Status: She is alert.   Psychiatric:         Mood and Affect: Mood normal.         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  c/w AC: Apixaban 5mg BID  Start Amiodarone 200mg daily - plan to continue after AAF ablation for 2 " months.  Planned for RFA for AF    #Pre-procedure Planning  Procedure Type: PeAF/AFL RFA - EnSite  Planned Date:  8/12/24  Anesthesia Needed: General Anesthesia  Anticoagulation: Apixaban 5mg BID, Does it need to be held: Hold Morning of procedure  Antiplatelet agents: None  SGLT2 Inhibitors: No  Other medication changes needed for procedure: Hold Morning of procedure  Contrast allergy? No  Imaging needed?  Planned Echo  Blood work to be ordered CBC and BMP within 30 days of procedure: Ordered within 30 days of procedure (encourage within 1-2 weeks)    #Sinus bradycardia  Pt does not reports increased fatigue, dizziness or LOC  Decrease Metoprolol XL to 100mg Daily to help with bradycardia    #HTN  c/w Lisinopril 40mg Daily  c/w Spironolactone 25mg Daily  If BP still elevated may need to consider addition of HCTZ for BP control    Return to Clinic: Patient should return to the EP Clinic in 6 months     Hank Tucker MD [facility]  Cardiac Electrophysiology  [email or web address]    **Disclaimer: This note was dictated by speech recognition, and every effort has been made to prevent any error in transcription, however minor errors may be present**

## 2024-07-19 DIAGNOSIS — E11.9 TYPE 2 DIABETES MELLITUS WITHOUT COMPLICATION, WITHOUT LONG-TERM CURRENT USE OF INSULIN (MULTI): ICD-10-CM

## 2024-07-19 RX ORDER — METFORMIN HYDROCHLORIDE 500 MG/1
1000 TABLET, EXTENDED RELEASE ORAL
Qty: 120 TABLET | Refills: 5 | Status: SHIPPED | OUTPATIENT
Start: 2024-07-19 | End: 2025-01-15

## 2024-07-19 NOTE — TELEPHONE ENCOUNTER
Rx Refill Request Telephone Encounter    Name:  Marissa Rebolledo  :  [id number]  Medication Name:  metFORMIN XR (Glucophage-XR) 500 mg 24 hr tablet       Specific Pharmacy location:    [facility] #[id number] - [place] - [address] AT [address] Phone: [phone]   Fax: [phone]        Date of last appointment:  24  Date of next appointment:    Best number to reach patient:  [phone]      Patient states that she has one pill left.  Patient can be reached at the above number.  Thank You

## 2024-07-29 DIAGNOSIS — I42.9 CARDIOMYOPATHY, UNSPECIFIED TYPE (MULTI): ICD-10-CM

## 2024-07-29 DIAGNOSIS — I10 HYPERTENSION, UNSPECIFIED TYPE: ICD-10-CM

## 2024-07-29 RX ORDER — LISINOPRIL 40 MG/1
40 TABLET ORAL DAILY
Qty: 90 TABLET | Refills: 1 | Status: SHIPPED | OUTPATIENT
Start: 2024-07-29

## 2024-08-01 DIAGNOSIS — F41.9 ANXIETY: ICD-10-CM

## 2024-08-01 DIAGNOSIS — K21.9 GASTROESOPHAGEAL REFLUX DISEASE, UNSPECIFIED WHETHER ESOPHAGITIS PRESENT: ICD-10-CM

## 2024-08-01 RX ORDER — ESOMEPRAZOLE MAGNESIUM 40 MG/1
40 CAPSULE, DELAYED RELEASE ORAL
Qty: 90 CAPSULE | Refills: 1 | Status: SHIPPED | OUTPATIENT
Start: 2024-08-01 | End: 2025-01-28

## 2024-08-01 RX ORDER — VENLAFAXINE HYDROCHLORIDE 150 MG/1
150 CAPSULE, EXTENDED RELEASE ORAL NIGHTLY
Qty: 30 CAPSULE | Refills: 2 | Status: SHIPPED | OUTPATIENT
Start: 2024-08-01 | End: 2025-01-28

## 2024-08-01 NOTE — TELEPHONE ENCOUNTER
Rx Refill Request Telephone Encounter    Name:  Marissa Rebolledo  :  [id number]  Medication Name:  +  esomeprazole (NexIUM) 40 mg DR capsule   venlafaxine XR (Effexor-XR) 150 mg 24 hr capsule       Specific Pharmacy location:    [facility] #[id number] - [place] - [address] AT [address] & [address] Phone: [phone]   Fax: [phone]        Date of last appointment:  24  Date of next appointment:    Best number to reach patient:  [phone]      Patient states that she is out of her medication and needs sent in as soon as possible.  Patient also states that for her Freestyle Aneesh test sensors to be approved a peer to peer needs to be done so that the insurance will cover them.  Once that is done the prescription then needs to be sent to [facility] in [place].  Patient can be reached at the number above.  Thank You

## 2024-08-01 NOTE — TELEPHONE ENCOUNTER
Requested Prescriptions     Pending Prescriptions Disp Refills    esomeprazole (NexIUM) 40 mg DR capsule 90 capsule 1     Sig: Take 1 capsule (40 mg) by mouth once daily in the morning. Take before meals.    venlafaxine XR (Effexor-XR) 150 mg 24 hr capsule 30 capsule 2     Sig: Take 1 capsule (150 mg) by mouth once daily at bedtime.

## 2024-08-05 ENCOUNTER — TELEPHONE (OUTPATIENT)
Dept: CARDIOLOGY | Facility: CLINIC | Age: 79
End: 2024-08-05
Payer: MEDICARE

## 2024-08-05 NOTE — TELEPHONE ENCOUNTER
Per Dr. Tucker, Ok to cancel ablation and echo if she is recovering no need for elective case. Please have Kareen call to reschedule procedures.    Called and notified patient. Informed pt that Dr. Tucker's  will call her to reschedule. Pt verbalizes understanding.

## 2024-08-05 NOTE — TELEPHONE ENCOUNTER
Pt called to report she tested positive for COVID last week and is not feeling good: headaches, congested, coughing all the time, coughing up thick yellow/brown mucous, increased fatigue. Pt states she is scheduled for an echo and labs on 8/8/24 and ablation on 8/12/24. Pt will need to cancel because she is too exhausted to have any procedures done at this time. Sent secure message to Dr. Tucker.

## 2024-08-06 ENCOUNTER — TELEPHONE (OUTPATIENT)
Dept: PRIMARY CARE | Facility: CLINIC | Age: 79
End: 2024-08-06
Payer: MEDICARE

## 2024-08-06 DIAGNOSIS — E11.9 TYPE 2 DIABETES MELLITUS WITHOUT COMPLICATION, WITH LONG-TERM CURRENT USE OF INSULIN (MULTI): ICD-10-CM

## 2024-08-06 DIAGNOSIS — Z79.4 TYPE 2 DIABETES MELLITUS WITHOUT COMPLICATION, WITH LONG-TERM CURRENT USE OF INSULIN (MULTI): ICD-10-CM

## 2024-08-06 DIAGNOSIS — E11.9 TYPE 2 DIABETES MELLITUS WITHOUT COMPLICATION, WITHOUT LONG-TERM CURRENT USE OF INSULIN (MULTI): ICD-10-CM

## 2024-08-06 RX ORDER — FLASH GLUCOSE SENSOR
KIT MISCELLANEOUS
Qty: 6 EACH | Refills: 3 | Status: SHIPPED | OUTPATIENT
Start: 2024-08-06

## 2024-08-06 NOTE — TELEPHONE ENCOUNTER
New prescription for freestyle nickie sensors sent to [facility] in [place].  She will likely need PA for them not peer to peer.

## 2024-08-06 NOTE — TELEPHONE ENCOUNTER
Patient states that for her Freestyle Aneesh test sensors to be approved a peer to peer needs to be done so that the insurance will cover them. Once that is done the prescription then needs to be sent to [facility] in [place]. Patient can be reached at [phone].        Thank You    Urinalysis obtained, we will call you with results and changes to plan of care   Lab obtained in clinic today, we will notify you of results and any necessary changes to plan of care   HCTZ 12.5 daily as needed for fluid retention  Refills provided on routine medications   Follow up in six months and as needed     Patient Education       Heart Healthy Diet   General   With a heart healthy food plan, you will learn to make better food choices. This diet may help you lower your blood cholesterol level, manage your blood pressure, and lower your risk for heart problems. Smaller portions may also be helpful.  Sodium is a type of mineral found in many foods. It helps keep the balance of fluids in your body. Too much sodium can raise your blood pressure. It can also make you take on extra water. This is called edema. Pay careful attention to how much salt or sodium is in your food. You may need to avoid salt or eat foods with less sodium.  Cholesterol is a fat-like, waxy substance in your blood. It is normal to have some cholesterol in your blood because your body makes it. You also get extra cholesterol from all animal products. These are foods like meats, eggs, and dairy products. Too much cholesterol in your blood can block or damage your blood vessels. This can lead to a heart attack or stroke.  Fats in your food have calories which give energy. Not all fats are bad. Some fats are healthy, like the fat found in fish, nuts, and olive oil. These are called unsaturated fats. They help manage body functions and lower cholesterol levels. Learn about the best fats to use in your diet and where to use them. Eating too much fat may make you more likely to weigh more than is healthy. This raises your risk of many heart problems.  Fiber is found in plants. Meat and dairy products do not have fiber in them. Fiber can help you lower your unhealthy cholesterol level. You may need more water as you eat more fiber so you do not get  hard stools.  What lifestyle changes are needed?   Eat a healthy diet and workout often. Try to use as many calories as you take in each day.  What changes to diet are needed?   · Eat oily fish at least 2 times a week. These are fish like tuna, salmon, and mackerel.  · Limit sodium to no more than 2,300 mg of sodium per day. This is about 1 teaspoon (5 grams) of table salt. Use little or no salt when making food. Try other spices or seasoning instead.  · Limit how much cholesterol you eat to less than 300 mg per day. You can do this by having lean meats. Also eat lots of fruits, vegetables, and fat-free and low-fat dairy products.  · Limit how much trans fats you eat. Trans fats are found in many processed foods like stick margarine, shortening, and some fried foods. Also, lower how much hydrogenated fats you eat. They are used to make pastries, biscuits, cookies, crackers, chips, and many snack foods.  · Have no more than 1 drink per day of beer, wine, and mixed drinks (alcohol).  Who should use this diet?   A heart healthy diet is good for everyone.  What foods are good to eat?   · Grains: Try to eat 6 to 8 servings of whole grain, high fiber foods each day. These are whole grain bread, cereals, brown rice, or pasta.  · Fruits and vegetables: Eat 4 to 5 servings each day. Try to pick many kinds and colors. Try to eat more that are fresh or frozen. Look for low sodium or salt-free if you choose canned. Rinse canned items before cooking or eating. Dried peas, beans, and lentils are also good.  · Dairy: Choose low fat (1%) or fat-free milk. Eat nonfat or low-fat products.  · Protein: Try to eat more low fat or lean meats like chicken and turkey. Eat less red meat and eat more fish, eggs, egg whites, and beans instead.  · Fats: Use good fats found in fish, nuts, and avocados. Try using olive oil, canola oil, and low-sodium and low-fat salad dressing and mayonnaise. Use corn, safflower, sunflower, and soybean  oils.  · Condiments: Use low-sodium or salt-free broths, soups, soy sauce, and condiments. Pepper, herbs, spices, vinegar, lemon or lime juices are great for seasoning. Sugar, cocoa powder, honey, syrup, and jams may be eaten in small amounts.  · Sweets: Low-fat, trans fat-free cookies, cakes, and pies; margarita crackers; animal crackers; low-fat fig bars; and nia snaps.     What foods should be limited or avoided?   · Grains: Salted breads, rolls, crackers, quick breads, self-rising flours, biscuit mixes, regular bread crumbs, instant hot cereals, commercially-prepared rice, pasta, stuffing mixes  · Fruits and vegetables: Commercially-prepared potatoes and vegetable mixes, regular canned vegetables and juices, vegetables frozen with sauce or pickled vegetables, processed fruits with added sugar or salt  · Dairy: Whole milk, malted milk, chocolate milk, buttermilk  · Protein: Smoked, cured, salted, or canned meat, fish, or poultry such as arnold and sausages  · Fats: Cut back on solid fats like butter, lard, and margarine.  · Condiments and snacks: Salted and canned peas, beans, and olives; salted snack foods; fried foods; soda, juices, or other sweetened drinks; commercially-softened water. Miso, salsa, ketchup, barbecue sauce, Worcestershire sauce, soy sauce, and teriyaki sauce are also high in salt.  · Sweets: High-fat baked goods such as muffins, donuts, pastries, commercial baked goods  Helpful tips   · When you go to a grocery store, have a list or a meal plan. Do not shop when you are hungry to avoid cravings for foods.  · You need to know about the sodium and fat content of the food you eat. Read food labels with care. They will show you how much of each is in a serving. This amount is given as a percentage of the total amount you need each day. Reading the labels will help you make healthy food choices.     · Avoid fast foods.  · Watch your portions when eating out. Split an order or bring home half for  another meal.     · Talk to a dietitian for help.  Where can I learn more?   American Academy of Family Physicians  https://familydoctor.org/diet-and-exercise-for-a-healthy-heart/   American Heart Association  https://www.heart.org/en/healthy-living/healthy-eating/eat-smart/nutrition-basics/aha-diet-and-lifestyle-recommendations   [facility]  https://www.nhs.uk/live-well/eat-well/   Last Reviewed Date   2020-03-27  Consumer Information Use and Disclaimer   This information is not specific medical advice and does not replace information you receive from your health care provider. This is only a brief summary of general information. It does NOT include all information about conditions, illnesses, injuries, tests, procedures, treatments, therapies, discharge instructions or life-style choices that may apply to you. You must talk with your health care provider for complete information about your health and treatment options. This information should not be used to decide whether or not to accept your health care providers advice, instructions or recommendations. Only your health care provider has the knowledge and training to provide advice that is right for you.  Copyright   Copyright © 2021 [employer] and its affiliates and/or licensors. All rights reserved.

## 2024-08-07 RX ORDER — FLASH GLUCOSE SENSOR
KIT MISCELLANEOUS
Qty: 6 EACH | Refills: 3 | OUTPATIENT
Start: 2024-08-07

## 2024-08-08 ENCOUNTER — APPOINTMENT (OUTPATIENT)
Dept: CARDIOLOGY | Facility: CLINIC | Age: 79
End: 2024-08-08
Payer: MEDICARE

## 2024-08-14 ENCOUNTER — OFFICE VISIT (OUTPATIENT)
Dept: WOUND CARE | Facility: CLINIC | Age: 79
End: 2024-08-14
Payer: MEDICARE

## 2024-08-14 PROCEDURE — 10140 I&D HMTMA SEROMA/FLUID COLLJ: CPT

## 2024-08-14 PROCEDURE — 99213 OFFICE O/P EST LOW 20 MIN: CPT

## 2024-08-22 DIAGNOSIS — I48.91 ATRIAL FIBRILLATION, UNSPECIFIED TYPE (MULTI): ICD-10-CM

## 2024-08-22 NOTE — TELEPHONE ENCOUNTER
Rx Refill Request Telephone Encounter    Name:  Marissa Rebolledo  :  [id number]  Medication Name:  apixaban (Eliquis) 5 mg tablet       Specific Pharmacy location:    [facility] #[id number] - [place] - [address] AT [address] & [address] Phone: [phone]   Fax: [phone]        Date of last appointment:  24  Date of next appointment:    Best number to reach patient:  [phone]        Patient states that she has three pills left.  Patient can be reached at the number above.  Thank You

## 2024-08-28 ENCOUNTER — OFFICE VISIT (OUTPATIENT)
Dept: WOUND CARE | Facility: CLINIC | Age: 79
End: 2024-08-28
Payer: MEDICARE

## 2024-08-28 PROCEDURE — 99212 OFFICE O/P EST SF 10 MIN: CPT

## 2024-09-04 ENCOUNTER — APPOINTMENT (OUTPATIENT)
Dept: CARDIOLOGY | Facility: CLINIC | Age: 79
End: 2024-09-04
Payer: MEDICARE

## 2024-09-04 VITALS
WEIGHT: 161 LBS | DIASTOLIC BLOOD PRESSURE: 82 MMHG | SYSTOLIC BLOOD PRESSURE: 131 MMHG | HEART RATE: 60 BPM | BODY MASS INDEX: 29.45 KG/M2

## 2024-09-04 DIAGNOSIS — E78.5 HYPERLIPIDEMIA, UNSPECIFIED HYPERLIPIDEMIA TYPE: ICD-10-CM

## 2024-09-04 DIAGNOSIS — I48.0 PAROXYSMAL ATRIAL FIBRILLATION (MULTI): ICD-10-CM

## 2024-09-04 DIAGNOSIS — I10 PRIMARY HYPERTENSION: ICD-10-CM

## 2024-09-04 DIAGNOSIS — I50.22 CHRONIC SYSTOLIC (CONGESTIVE) HEART FAILURE: ICD-10-CM

## 2024-09-04 PROCEDURE — 99213 OFFICE O/P EST LOW 20 MIN: CPT | Performed by: STUDENT IN AN ORGANIZED HEALTH CARE EDUCATION/TRAINING PROGRAM

## 2024-09-04 RX ORDER — MUPIROCIN 20 MG/G
OINTMENT TOPICAL
COMMUNITY
Start: 2023-09-23 | End: 2024-09-04 | Stop reason: WASHOUT

## 2024-09-04 ASSESSMENT — ENCOUNTER SYMPTOMS
PND: 0
RESPIRATORY NEGATIVE: 1
EYES NEGATIVE: 1
NEAR-SYNCOPE: 0
ORTHOPNEA: 0
DYSPNEA ON EXERTION: 0
ALLERGIC/IMMUNOLOGIC NEGATIVE: 1
CONSTITUTIONAL NEGATIVE: 1
HEMATOLOGIC/LYMPHATIC NEGATIVE: 1
GASTROINTESTINAL NEGATIVE: 1
PALPITATIONS: 0
MUSCULOSKELETAL NEGATIVE: 1
ENDOCRINE NEGATIVE: 1
PSYCHIATRIC NEGATIVE: 1
SYNCOPE: 0
NEUROLOGICAL NEGATIVE: 1

## 2024-09-04 NOTE — PROGRESS NOTES
[facility] Cardiology Outpatient Follow-up Visit     Reason for Visit: follow-up chronic systolic heart failure; paroxysmal afib.     HPI: Marissa Rebolledo is a 78 y.o.  female who presents today for follow-up chronic systolic heart failure; paroxysmal afib. Past medical history of paroxysmal afib ([id number] 6- On apixaban), cardiomyopathy with mild to moderate LV systolic dysfunction (LVEF 40% in 2022), Type II DM, Hx SARS2-COVID (12/2021- required hospitalization).      Patient presented to cardiology clinic on 11/30/2022. No active cardiac complaints at this time. No chest pains. Patient is not always able to tell when she is in afib, but does typically note generalized fatigue during such episodes. She is established with EP and considering possiblity of afib ablation (PVI). Currently tolerating apixaban without significant bleeding issues. No orthopena, PND, syncope.      Recently seen by EP October 2023 > sinus bradycardia > metoprolol was decreased. Patient returned to cardiology clinic on 11/30/2023.  No active cardiac complaints. Interval improvement in HR and generalized fatigue with down-titration of metoprolol.  Euvolemic on exam.     Marissa presented to cardiology clinic on 2/2/2024.  Over the past couple weeks patient has noted generalized fatigue and dyspnea.  Heart rate has been irregular.  EKG in office today showed atrial fibrillation with heart rate 110 bpm.  Patient notes good adherence with her cardiac medications.  Tolerating apixaban without significant bleeding issues.  Given symptomatic atrial fibrillation discussed the option of elective electrical cardioversion.  Patient agreeable to proceed.  We will schedule first available DCCV at [facility].  We also have patient follow-up with electrophysiology for reconsideration of PVI/A-fib ablation.    Patient was scheduled to undergo elective cardioversion on 2/15/2024. On day of procedure patient was found to have spontaneously  converted to sinus rhythm > DCCV cancelled. Patient returned to cardiology clinic on 3/5/2024.  Currently asymptomatic from a cardiac perspective.  Notes interval improvement in generalized fatigue and dyspnea after conversion to sinus rhythm.  Appears euvolemic on exam. Tolerating apixaban without significant bleeding issues.     Patient return to cardiology clinic on 24.  Since her last visit patient had recurrence of atrial fibrillation that has been accompanied by generalized fatigue and exertional dyspnea.  Given symptomatic atrial fibrillation recommend referral to electrophysiology for consideration of ablation; patient agreeable.  Given fatigue we will reassess LVEF with limited TTE.  Discussed option of cardioversion if symptoms poorly controlled.    Past Medical History:   - as above    Surgical History:   She has a past surgical history that includes Colostomy (2013); Knee surgery (2013); and Colectomy (12/10/2013).    Family History:   Family History   Problem Relation Name Age of Onset    Heart disease Mother      Hypertension Mother      Cancer Father      Heart disease Father       - CV disease (father); mother- no heart disease (lived to ), CHF (sister), brother- PPM; son ( age 50 2/2 throat cancer)     Allergies:  Metformin, Mirtazapine, Rosuvastatin, Simvastatin, Sulfamethoxazole-trimethoprim, Jardiance [empagliflozin], Alendronic acid, Clarithromycin, Fosamax [alendronate], Nitrofurantoin monohyd/m-cryst, and Ofloxacin     Social History:   - former smoker (Quit 40 years ago; 10 pack year hx); no alcohol use; no illicit drug use.     Prior Cardiovascular Testing (Personally Reviewed):     TTE (2022)- Mild to moderate LV systolic function decrease (LVEF 40%), mild to moderate MR, mildly elevated RVSP, global LV hypokinesis     Review of Systems:  Review of Systems   Constitutional: Positive for malaise/fatigue.   HENT: Negative.     Eyes: Negative.    Cardiovascular:   Positive for dyspnea on exertion. Negative for chest pain, near-syncope, orthopnea, palpitations, paroxysmal nocturnal dyspnea and syncope.   Respiratory: Negative.     Endocrine: Negative.    Hematologic/Lymphatic: Negative.    Skin: Negative.    Musculoskeletal: Negative.    Gastrointestinal: Negative.    Genitourinary: Negative.    Neurological: Negative.    Psychiatric/Behavioral: Negative.     Allergic/Immunologic: Negative.        Outpatient Medications:    Current Outpatient Medications:     albuterol 90 mcg/actuation inhaler, Inhale 1-2 puffs every 6 hours if needed for wheezing., Disp: 18 g, Rfl: 0    amiodarone (Pacerone) 200 mg tablet, Take 1 tablet (200 mg) by mouth once daily., Disp: 30 tablet, Rfl: 5    apixaban (Eliquis) 5 mg tablet, Take 1 tablet (5 mg) by mouth 2 times a day., Disp: 180 tablet, Rfl: 1    atorvastatin (Lipitor) 20 mg tablet, Take 1 tablet (20 mg) by mouth once daily., Disp: 90 tablet, Rfl: 1    azelastine (Astelin) 137 mcg (0.1 %) nasal spray, 1 spray by Does not apply route twice a day., Disp: , Rfl:     esomeprazole (NexIUM) 40 mg DR capsule, Take 1 capsule (40 mg) by mouth once daily in the morning. Take before meals., Disp: 90 capsule, Rfl: 1    flash glucose sensor kit (FreeStyle Aneesh 2 Sensor) kit, Use as instructed 1 device every 14 days, Disp: 6 each, Rfl: 3    FreeStyle Aneesh reader (FreeStyle Aneesh 2 [place]) misc, Use as instructed, Disp: 1 each, Rfl: 0    glipiZIDE (Glucotrol) 5 mg tablet, Take 1 tablet (5 mg) by mouth 2 times a day before meals., Disp: 180 tablet, Rfl: 1    insulin lispro (HumaLOG KwikPen Insulin) 100 unit/mL injection, Inject 3 times daily using sliding scale: 0-150 no units, 151-200 2 units, 201-250 4 units, 251-300 6 units, 301-350 8 units, 351-400 10 units, above 400 call Dr. Ben Bishop 30 units daily, Disp: 15 mL, Rfl: 3    levothyroxine (Synthroid, Levoxyl) 50 mcg tablet, Take 1 tablet (50 mcg) by mouth once daily., Disp: 90 tablet, Rfl: 1     lisinopril 40 mg tablet, TAKE 1 TABLET(40 MG) BY MOUTH EVERY DAY, Disp: 90 tablet, Rfl: 1    metFORMIN  mg 24 hr tablet, Take 2 tablets (1,000 mg) by mouth 2 times daily (morning and late afternoon)., Disp: 120 tablet, Rfl: 5    metoprolol succinate XL (Toprol-XL) 100 mg 24 hr tablet, Take 1 tablet (100 mg) by mouth every 12 hours. Please monitor your heart rate and blood pressure before taking second dose. Avoid taking if your blood pressure or heart rate are too low, Disp: 60 tablet, Rfl: 0    spironolactone (Aldactone) 25 mg tablet, Take 1 tablet (25 mg) by mouth once daily., Disp: 90 tablet, Rfl: 1    venlafaxine XR (Effexor-XR) 150 mg 24 hr capsule, Take 1 capsule (150 mg) by mouth once daily at bedtime., Disp: 30 capsule, Rfl: 2     Last Recorded Vitals  /82 (BP Location: Left arm, Patient Position: Sitting, BP Cuff Size: Adult)   Pulse 60   Wt 73 kg (161 lb)   LMP  (LMP Unknown)   BMI 29.45 kg/m²     Physical Exam:    Physical Exam  Constitutional:       General: She is not in acute distress.  HENT:      Head: Normocephalic.      Mouth/Throat:      Mouth: Mucous membranes are moist.   Eyes:      Extraocular Movements: Extraocular movements intact.      Conjunctiva/sclera: Conjunctivae normal.   Neck:      Vascular: No JVD.   Cardiovascular:      Rate and Rhythm: Normal rate. Rhythm irregularly irregular.      Pulses: Normal pulses.      Heart sounds: No murmur heard.  Pulmonary:      Effort: Pulmonary effort is normal. No respiratory distress.      Breath sounds: Normal breath sounds.   Abdominal:      General: There is no distension.   Musculoskeletal:         General: No swelling.   Skin:     General: Skin is warm and dry.   Neurological:      General: No focal deficit present.      Mental Status: She is alert.      Cranial Nerves: No cranial nerve deficit.      Motor: No weakness.   Psychiatric:         Mood and Affect: Mood normal.         Behavior: Behavior normal.          Lab/Radiology/Diagnostic Review:    Labs    Lab Results   Component Value Date    GLUCOSE 133 (H) 06/27/2024    CALCIUM 9.4 06/27/2024     06/27/2024    K 4.3 06/27/2024    CO2 26 06/27/2024     06/27/2024    BUN 12 06/27/2024    CREATININE 0.91 06/27/2024       Lab Results   Component Value Date    WBC 8.3 06/27/2024    HGB 12.7 06/27/2024    HCT 40.0 06/27/2024    MCV 84 06/27/2024     06/27/2024       Lab Results   Component Value Date    CHOL 122 06/24/2024    CHOL 148 01/31/2024    CHOL 150 05/01/2023     Lab Results   Component Value Date    HDL 32.6 06/24/2024    HDL 39.2 01/31/2024    HDL 49.4 05/01/2023     Lab Results   Component Value Date    LDLCALC 47 06/24/2024    LDLCALC 64 01/31/2024     Lab Results   Component Value Date    TRIG 213 (H) 06/24/2024    TRIG 224 (H) 01/31/2024    TRIG 117 05/01/2023       Lab Results   Component Value Date     (H) 06/27/2024     (H) 11/03/2022       Lab Results   Component Value Date    TSH 0.78 06/27/2024       Assessment:   78 y.o.  female who presents today for follow-up chronic systolic heart failure; paroxysmal afib. Past medical history of paroxysmal afib ([id number] 6- On apixaban), cardiomyopathy with mild to moderate LV systolic dysfunction (LVEF 40%), Type II DM, Hx SARS2-COVID (12/2021- required hospitalization).     Patient was scheduled to undergo elective cardioversion on 2/15/2024. On day of procedure patient was found to have spontaneously converted to sinus rhythm > DCCV cancelled. Patient returned to cardiology clinic on 3/5/2024.  Currently asymptomatic from a cardiac perspective.  Notes interval improvement in generalized fatigue and dyspnea after conversion to sinus rhythm.  Appears euvolemic on exam. Tolerating apixaban without significant bleeding issues.     Patient return to cardiology clinic on 9/4/24.  Since her last visit patient had recurrence of atrial fibrillation that has been accompanied  by generalized fatigue and exertional dyspnea.  Given symptomatic atrial fibrillation recommend referral to electrophysiology for consideration of ablation; patient agreeable.  Given fatigue we will reassess LVEF with limited TTE.  Discussed option of cardioversion if symptoms poorly controlled.    Overall Plan:  1. atrial fibrillation ([id number] 6)  - continue apixaban, per EP continue beta blockade; follow-up with Dr. Tucker from EP for re-consideration of ablation      2. 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 DLD (goal LDL < 100 mg/dl)  - continue atorvastatin     4. Type II DM- continue metformin; if Empagliflozin not cost-prohibitive can re-start in near future    Disposition- Return to cardiology clinic in 3-6 months    Thank you for your visit today. Please contact our office with any questions.     Abhilash Vieira MD

## 2024-09-12 DIAGNOSIS — I10 HYPERTENSION, UNSPECIFIED TYPE: ICD-10-CM

## 2024-09-12 RX ORDER — METOPROLOL SUCCINATE 100 MG/1
100 TABLET, EXTENDED RELEASE ORAL EVERY 12 HOURS
Qty: 180 TABLET | Refills: 1 | Status: SHIPPED | OUTPATIENT
Start: 2024-09-12 | End: 2025-03-11

## 2024-09-12 NOTE — TELEPHONE ENCOUNTER
Requested Prescriptions     Pending Prescriptions Disp Refills    metoprolol succinate XL (Toprol-XL) 100 mg 24 hr tablet 180 tablet 1     Sig: Take 1 tablet (100 mg) by mouth every 12 hours. Please monitor your heart rate and blood pressure before taking second dose. Avoid taking if your blood pressure or heart rate are too low

## 2024-09-12 NOTE — TELEPHONE ENCOUNTER
Rx Refill Request Telephone Encounter    Name:  Marissa Rebolledo  :  [id number]  Medication Name:  metoprolol succinate XL (Toprol-XL) 100 mg 24 hr tablet       Specific Pharmacy location:    [id number] - [place] - [address] AT [address] Phone: [phone]   Fax: [phone]        Date of last appointment:  24  Date of next appointment:    Best number to reach patient:  [phone]        Thank You

## 2024-09-23 ENCOUNTER — HOSPITAL ENCOUNTER (OUTPATIENT)
Dept: CARDIOLOGY | Facility: CLINIC | Age: 79
Discharge: HOME | End: 2024-09-23
Payer: MEDICARE

## 2024-09-23 VITALS
HEIGHT: 62 IN | DIASTOLIC BLOOD PRESSURE: 82 MMHG | SYSTOLIC BLOOD PRESSURE: 131 MMHG | WEIGHT: 161 LBS | BODY MASS INDEX: 29.63 KG/M2

## 2024-09-23 DIAGNOSIS — I48.91 ATRIAL FIBRILLATION, UNSPECIFIED TYPE (MULTI): ICD-10-CM

## 2024-09-23 DIAGNOSIS — R06.02 SHORTNESS OF BREATH ON EXERTION: ICD-10-CM

## 2024-09-23 LAB
AORTIC VALVE MEAN GRADIENT: 2.6 MMHG
AORTIC VALVE PEAK VELOCITY: 1.05 M/S
AV PEAK GRADIENT: 4.4 MMHG
AVA (PEAK VEL): 2.74 CM2
AVA (VTI): 2.71 CM2
EJECTION FRACTION APICAL 4 CHAMBER: 40.1
EJECTION FRACTION: 38 %
LEFT ATRIUM VOLUME AREA LENGTH INDEX BSA: 31.5 ML/M2
LEFT VENTRICLE INTERNAL DIMENSION DIASTOLE: 4.72 CM (ref 3.5–6)
LEFT VENTRICULAR OUTFLOW TRACT DIAMETER: 2.01 CM
MITRAL VALVE E/A RATIO: 139.77
RIGHT VENTRICLE FREE WALL PEAK S': 0.7 CM/S
RIGHT VENTRICLE PEAK SYSTOLIC PRESSURE: 26.1 MMHG
TRICUSPID ANNULAR PLANE SYSTOLIC EXCURSION: 1.7 CM

## 2024-09-23 PROCEDURE — 93306 TTE W/DOPPLER COMPLETE: CPT | Performed by: INTERNAL MEDICINE

## 2024-09-23 PROCEDURE — 93306 TTE W/DOPPLER COMPLETE: CPT

## 2024-09-30 ENCOUNTER — TELEPHONE (OUTPATIENT)
Dept: PRIMARY CARE | Facility: CLINIC | Age: 79
End: 2024-09-30
Payer: MEDICARE

## 2024-09-30 DIAGNOSIS — Z79.4 TYPE 2 DIABETES MELLITUS WITHOUT COMPLICATION, WITH LONG-TERM CURRENT USE OF INSULIN (MULTI): ICD-10-CM

## 2024-09-30 DIAGNOSIS — E11.9 TYPE 2 DIABETES MELLITUS WITHOUT COMPLICATION, WITH LONG-TERM CURRENT USE OF INSULIN (MULTI): ICD-10-CM

## 2024-09-30 RX ORDER — FLASH GLUCOSE SENSOR
KIT MISCELLANEOUS
Qty: 6 EACH | Refills: 3 | Status: CANCELLED | OUTPATIENT
Start: 2024-09-30

## 2024-09-30 RX ORDER — FLASH GLUCOSE SENSOR
KIT MISCELLANEOUS
Qty: 6 EACH | Refills: 3 | Status: SHIPPED | OUTPATIENT
Start: 2024-09-30

## 2024-09-30 NOTE — TELEPHONE ENCOUNTER
Pt last OV 6/24/2024    Requested Prescriptions     Pending Prescriptions Disp Refills    flash glucose sensor kit (FreeStyle Aneesh 2 Sensor) kit 6 each 3     Sig: Use as instructed 1 device every 14 days

## 2024-10-07 ENCOUNTER — TELEPHONE (OUTPATIENT)
Dept: CARDIOLOGY | Facility: CLINIC | Age: 79
End: 2024-10-07
Payer: MEDICARE

## 2024-10-12 ASSESSMENT — ENCOUNTER SYMPTOMS: DYSPNEA ON EXERTION: 1

## 2024-10-15 ENCOUNTER — TELEMEDICINE (OUTPATIENT)
Dept: CARDIOLOGY | Facility: CLINIC | Age: 79
End: 2024-10-15
Payer: MEDICARE

## 2024-10-15 DIAGNOSIS — I10 HYPERTENSION: ICD-10-CM

## 2024-10-15 DIAGNOSIS — I50.22 CHRONIC SYSTOLIC (CONGESTIVE) HEART FAILURE: ICD-10-CM

## 2024-10-15 DIAGNOSIS — I48.0 PAROXYSMAL ATRIAL FIBRILLATION (MULTI): Primary | ICD-10-CM

## 2024-10-15 DIAGNOSIS — E78.5 HYPERLIPIDEMIA: ICD-10-CM

## 2024-10-15 PROBLEM — E11.9 TYPE 2 DIABETES MELLITUS WITHOUT COMPLICATIONS (MULTI): Status: RESOLVED | Noted: 2022-11-08 | Resolved: 2024-10-15

## 2024-10-15 PROCEDURE — 99215 OFFICE O/P EST HI 40 MIN: CPT | Performed by: STUDENT IN AN ORGANIZED HEALTH CARE EDUCATION/TRAINING PROGRAM

## 2024-10-15 PROCEDURE — 1123F ACP DISCUSS/DSCN MKR DOCD: CPT | Performed by: STUDENT IN AN ORGANIZED HEALTH CARE EDUCATION/TRAINING PROGRAM

## 2024-10-15 NOTE — PROGRESS NOTES
"    Cardiac Electrophysiology Office Visit     Referred by Dr. Winters ref. provider found for   No chief complaint on file.     HPI:  Marissa Rebolledo is a 78 y.o. year old female patient with h/o COVID (Dec 2021 - Hospitalization), New DX of DM, CHF (HFrEF - 40%), atrial fibrillation  presenting today for follow up    PMHx/PSHx: As above  Tobacco quit 40 years - 1ppd for 10 years , alcohol - denies, caffeine use 1-2 cups/coffee day , drug use - denies  FamHx: Father \" Heart disease\"; Mother - D@92, no heart disease, Sister - Recent Dx of CHF, Brother - PPM; Son - D@50 2/2 Throat CA.    Objective  Allergies   Allergen Reactions    Metformin Headache and GI Upset     Abdominal pain    Mirtazapine Hives    Rosuvastatin Myalgia     Muscle spasm/weakness    Simvastatin Myalgia     Muscle spasms/weakness  Leg cramps     Sulfamethoxazole-Trimethoprim Hives    Jardiance [Empagliflozin] Other    Alendronic Acid Other    Clarithromycin Other     weak    Fosamax [Alendronate] Other     weak    Nitrofurantoin Monohyd/M-Cryst Other     weak    Ofloxacin Other     weakness        Current Outpatient Medications   Medication Instructions    albuterol 90 mcg/actuation inhaler 1-2 puffs, inhalation, Every 6 hours PRN    amiodarone (PACERONE) 200 mg, oral, Daily    apixaban (ELIQUIS) 5 mg, oral, 2 times daily    atorvastatin (LIPITOR) 20 mg, oral, Daily    azelastine (Astelin) 137 mcg (0.1 %) nasal spray 1 spray, Does not apply, 2 times daily    esomeprazole (NEXIUM) 40 mg, oral, Daily before breakfast    flash glucose sensor kit (FreeStyle Aneesh 2 Sensor) kit Use as instructed 1 device every 14 days    FreeStyle Aneesh reader (FreeStyle Aneesh 2 [place]) misc Use as instructed    glipiZIDE (GLUCOTROL) 5 mg, oral, 2 times daily before meals    insulin lispro (HumaLOG KwikPen Insulin) 100 unit/mL injection Inject 3 times daily using sliding scale: 0-150 no units, 151-200 2 units, 201-250 4 units, 251-300 6 units, 301-350 8 units, 351-400 10 " units, above 400 call Dr. Ben Bishop 30 units daily    levothyroxine (SYNTHROID, LEVOXYL) 50 mcg, oral, Daily    lisinopril 40 mg, oral, Daily    metFORMIN XR (GLUCOPHAGE-XR) 1,000 mg, oral, 2 times daily (morning and late afternoon)    metoprolol succinate XL (TOPROL-XL) 100 mg, oral, Every 12 hours, Please monitor your heart rate and blood pressure before taking second dose. Avoid taking if your blood pressure or heart rate are too low    spironolactone (ALDACTONE) 25 mg, oral, Daily    venlafaxine XR (EFFEXOR-XR) 150 mg, oral, Nightly         Visit Vitals  LMP  (LMP Unknown)   OB Status Postmenopausal   Smoking Status Former        Physical Exam  Vitals reviewed: Virtual visit, exam limited.   Constitutional:       General: She is not in acute distress.     Appearance: Normal appearance.   Neurological:      Mental Status: She is alert.           My Interpretation of Reviewed Study(s):  Echo (Oct 2022): LVEF 40%, global hypokinesis, likely Tachycardia induced CM  Echo (September 2024): Moderately decreased LV function with EF of 35 to 40% mild in left atrium and right atrium no pericardial effusion    Assessment/Plan   #Paroxysmal atrial fibrillation  AF Dx History: Dec 2022; h/o Cardioversion: No; AAD Use: None; Anticoagulation use: Apixaban 5mg BID (current); h/o Ablation: Attempted but not proceeded due to Equipment failure; [id number] Score: 6  Patient was previously planned for a ablation procedure however had COVID.  Since then patient has had increased fatigue and still have persistent atrial fibrillation.  Since patient is currently already on amiodarone at this point  (prior cardioversion attempts were canceled due to patient being in sinus rhythm) ablative strategy is a  Strategy.  I discussed in detail again with the patient the concern about long-term atrial fibrillation with her cardiomyopathy which is likely tach induced in the past strategy to maintain LV function or try improve it would be to  get patient back into regular rhythm.  c/w AC: Apixaban 5mg BID  C/w Amiodarone 200mg daily  Planned for RFA for AF    #Pre-procedure Planning  Procedure Type: PeAF RFA - EnSite  Planned Date:  11/6/24 @ [facility]  Anesthesia Needed: General Anesthesia  Medication and Labwork Instructions:  Anticoagulation: Apixaban 5mg BID, Does it need to be held: Hold Morning of procedure  Antiplatelet agents: None  SGLT2 Inhibitors:  If on any stop 3 days prior  GLP1 Weekly shots:  If on any stop 8 days prior  Other medication changes needed for procedure: Hold Morning of procedure    Contrast allergy? No    Imaging needed? No  Blood work ordered: CBC, BMP, and Type and Screen ordered within 21 days of procedure (encourage within 1week)      #Sinus bradycardia  Pt does not reports increased fatigue, dizziness or LOC  Metoprolol XL 100mg Daily (will likely need to decrease post ablation in SR)    #HTN  c/w Lisinopril 40mg Daily  c/w Spironolactone 25mg Daily  If BP still elevated may need to consider addition of HCTZ for BP control    Return to Clinic: Patient should return to the EP Clinic in 5-6 weeks post-procedure    Hank Tucker MD [facility]  Cardiac Electrophysiology  [email or web address]    **Disclaimer: This note was dictated by speech recognition, and every effort has been made to prevent any error in transcription, however minor errors may be present**

## 2024-10-15 NOTE — LETTER
"October 15, 2024     Abhilash Vieira MD  [address]  [address]  [facility] [address]    Patient: Marissa Rebolledo   YOB: 1945   Date of Visit: 10/15/2024       Dear Dr. Abhilash Vieira MD:    Thank you for referring Marissa Rebolledo to me for evaluation. Below are my notes for this consultation.  If you have questions, please do not hesitate to call me. I look forward to following your patient along with you.       Sincerely,     Hank Tucker MD      CC: No Recipients  ______________________________________________________________________________________        Cardiac Electrophysiology Office Visit     Referred by Dr. Winters ref. provider found for   No chief complaint on file.     HPI:  Marissa Rebolledo is a 78 y.o. year old female patient with h/o COVID (Dec 2021 - Hospitalization), New DX of DM, CHF (HFrEF - 40%), atrial fibrillation  presenting today for follow up    PMHx/PSHx: As above  Tobacco quit 40 years - 1ppd for 10 years , alcohol - denies, caffeine use 1-2 cups/coffee day , drug use - denies  FamHx: Father \" Heart disease\"; Mother - D@92, no heart disease, Sister - Recent Dx of CHF, Brother - PPM; Son - D@50 2/2 Throat CA.    Objective  Allergies   Allergen Reactions   • Metformin Headache and GI Upset     Abdominal pain   • Mirtazapine Hives   • Rosuvastatin Myalgia     Muscle spasm/weakness   • Simvastatin Myalgia     Muscle spasms/weakness  Leg cramps    • Sulfamethoxazole-Trimethoprim Hives   • Jardiance [Empagliflozin] Other   • Alendronic Acid Other   • Clarithromycin Other     weak   • Fosamax [Alendronate] Other     weak   • Nitrofurantoin Monohyd/M-Cryst Other     weak   • Ofloxacin Other     weakness        Current Outpatient Medications   Medication Instructions   • albuterol 90 mcg/actuation inhaler 1-2 puffs, inhalation, Every 6 hours PRN   • amiodarone (PACERONE) 200 mg, oral, Daily   • apixaban (ELIQUIS) 5 mg, oral, 2 times daily   • atorvastatin (LIPITOR) 20 mg, " oral, Daily   • azelastine (Astelin) 137 mcg (0.1 %) nasal spray 1 spray, Does not apply, 2 times daily   • esomeprazole (NEXIUM) 40 mg, oral, Daily before breakfast   • flash glucose sensor kit (FreeStyle Aneesh 2 Sensor) kit Use as instructed 1 device every 14 days   • FreeStyle Aneesh reader (FreeStyle Aneesh 2 [place]) misc Use as instructed   • glipiZIDE (GLUCOTROL) 5 mg, oral, 2 times daily before meals   • insulin lispro (HumaLOG KwikPen Insulin) 100 unit/mL injection Inject 3 times daily using sliding scale: 0-150 no units, 151-200 2 units, 201-250 4 units, 251-300 6 units, 301-350 8 units, 351-400 10 units, above 400 call Dr. Ben Bishop 30 units daily   • levothyroxine (SYNTHROID, LEVOXYL) 50 mcg, oral, Daily   • lisinopril 40 mg, oral, Daily   • metFORMIN XR (GLUCOPHAGE-XR) 1,000 mg, oral, 2 times daily (morning and late afternoon)   • metoprolol succinate XL (TOPROL-XL) 100 mg, oral, Every 12 hours, Please monitor your heart rate and blood pressure before taking second dose. Avoid taking if your blood pressure or heart rate are too low   • spironolactone (ALDACTONE) 25 mg, oral, Daily   • venlafaxine XR (EFFEXOR-XR) 150 mg, oral, Nightly         Visit Vitals  LMP  (LMP Unknown)   OB Status Postmenopausal   Smoking Status Former        Physical Exam  Vitals reviewed: Virtual visit, exam limited.   Constitutional:       General: She is not in acute distress.     Appearance: Normal appearance.   Neurological:      Mental Status: She is alert.           My Interpretation of Reviewed Study(s):  Echo (Oct 2022): LVEF 40%, global hypokinesis, likely Tachycardia induced CM  Echo (September 2024): Moderately decreased LV function with EF of 35 to 40% mild in left atrium and right atrium no pericardial effusion    Assessment/Plan   #Paroxysmal atrial fibrillation  AF Dx History: Dec 2022; h/o Cardioversion: No; AAD Use: None; Anticoagulation use: Apixaban 5mg BID (current); h/o Ablation: Attempted but not proceeded due  to Equipment failure; [id number] Score: 6  Patient was previously planned for a ablation procedure however had COVID.  Since then patient has had increased fatigue and still have persistent atrial fibrillation.  Since patient is currently already on amiodarone at this point  (prior cardioversion attempts were canceled due to patient being in sinus rhythm) ablative strategy is a  Strategy.  I discussed in detail again with the patient the concern about long-term atrial fibrillation with her cardiomyopathy which is likely tach induced in the past strategy to maintain LV function or try improve it would be to get patient back into regular rhythm.  c/w AC: Apixaban 5mg BID  C/w Amiodarone 200mg daily  Planned for RFA for AF    #Pre-procedure Planning  Procedure Type: PeAF RFA - EnSite  Planned Date:  11/6/24 @ [facility]  Anesthesia Needed: General Anesthesia  Medication and Labwork Instructions:  Anticoagulation: Apixaban 5mg BID, Does it need to be held: Hold Morning of procedure  Antiplatelet agents: None  SGLT2 Inhibitors:  If on any stop 3 days prior  GLP1 Weekly shots:  If on any stop 8 days prior  Other medication changes needed for procedure: Hold Morning of procedure    Contrast allergy? No    Imaging needed? No  Blood work ordered: CBC, BMP, and Type and Screen ordered within 21 days of procedure (encourage within 1week)      #Sinus bradycardia  Pt does not reports increased fatigue, dizziness or LOC  Metoprolol XL 100mg Daily (will likely need to decrease post ablation in SR)    #HTN  c/w Lisinopril 40mg Daily  c/w Spironolactone 25mg Daily  If BP still elevated may need to consider addition of HCTZ for BP control    Return to Clinic: Patient should return to the EP Clinic in 5-6 weeks post-procedure    Hank Tucker MD [facility]  Cardiac Electrophysiology  [email or web address]    **Disclaimer: This note was dictated by speech recognition, and every effort has been made to prevent any error in  transcription, however minor errors may be present**

## 2024-10-28 ENCOUNTER — LAB (OUTPATIENT)
Dept: LAB | Facility: LAB | Age: 79
End: 2024-10-28
Payer: MEDICARE

## 2024-10-28 DIAGNOSIS — F41.9 ANXIETY: ICD-10-CM

## 2024-10-28 DIAGNOSIS — I48.0 PAROXYSMAL ATRIAL FIBRILLATION (MULTI): ICD-10-CM

## 2024-10-28 LAB
ABO GROUP (TYPE) IN BLOOD: NORMAL
ANION GAP SERPL CALC-SCNC: 15 MMOL/L (ref 10–20)
ANTIBODY SCREEN: NORMAL
BUN SERPL-MCNC: 13 MG/DL (ref 6–23)
CALCIUM SERPL-MCNC: 9.5 MG/DL (ref 8.6–10.3)
CHLORIDE SERPL-SCNC: 103 MMOL/L (ref 98–107)
CO2 SERPL-SCNC: 28 MMOL/L (ref 21–32)
CREAT SERPL-MCNC: 1.07 MG/DL (ref 0.5–1.05)
EGFRCR SERPLBLD CKD-EPI 2021: 54 ML/MIN/1.73M*2
ERYTHROCYTE [DISTWIDTH] IN BLOOD BY AUTOMATED COUNT: 17.5 % (ref 11.5–14.5)
GLUCOSE SERPL-MCNC: 106 MG/DL (ref 74–99)
HCT VFR BLD AUTO: 43.4 % (ref 36–46)
HGB BLD-MCNC: 13.2 G/DL (ref 12–16)
INR PPP: 1.7 (ref 0.9–1.1)
MCH RBC QN AUTO: 25.3 PG (ref 26–34)
MCHC RBC AUTO-ENTMCNC: 30.4 G/DL (ref 32–36)
MCV RBC AUTO: 83 FL (ref 80–100)
NRBC BLD-RTO: 0 /100 WBCS (ref 0–0)
PLATELET # BLD AUTO: 246 X10*3/UL (ref 150–450)
POTASSIUM SERPL-SCNC: 4.9 MMOL/L (ref 3.5–5.3)
PROTHROMBIN TIME: 19.1 SECONDS (ref 9.8–12.8)
RBC # BLD AUTO: 5.22 X10*6/UL (ref 4–5.2)
RH FACTOR (ANTIGEN D): NORMAL
SODIUM SERPL-SCNC: 141 MMOL/L (ref 136–145)
WBC # BLD AUTO: 8.4 X10*3/UL (ref 4.4–11.3)

## 2024-10-28 PROCEDURE — 36415 COLL VENOUS BLD VENIPUNCTURE: CPT

## 2024-10-28 PROCEDURE — 85610 PROTHROMBIN TIME: CPT

## 2024-10-28 PROCEDURE — 85027 COMPLETE CBC AUTOMATED: CPT

## 2024-10-28 PROCEDURE — 80048 BASIC METABOLIC PNL TOTAL CA: CPT

## 2024-10-28 RX ORDER — VENLAFAXINE HYDROCHLORIDE 150 MG/1
150 CAPSULE, EXTENDED RELEASE ORAL NIGHTLY
Qty: 30 CAPSULE | Refills: 0 | Status: SHIPPED | OUTPATIENT
Start: 2024-10-28 | End: 2024-11-27

## 2024-11-06 ENCOUNTER — HOSPITAL ENCOUNTER (OUTPATIENT)
Facility: HOSPITAL | Age: 79
Discharge: HOME | End: 2024-11-07
Attending: STUDENT IN AN ORGANIZED HEALTH CARE EDUCATION/TRAINING PROGRAM | Admitting: STUDENT IN AN ORGANIZED HEALTH CARE EDUCATION/TRAINING PROGRAM
Payer: MEDICARE

## 2024-11-06 ENCOUNTER — APPOINTMENT (OUTPATIENT)
Dept: CARDIOLOGY | Facility: HOSPITAL | Age: 79
End: 2024-11-06
Payer: MEDICARE

## 2024-11-06 ENCOUNTER — HOSPITAL ENCOUNTER (OUTPATIENT)
Dept: CARDIOLOGY | Facility: HOSPITAL | Age: 79
Discharge: HOME | End: 2024-11-06
Payer: MEDICARE

## 2024-11-06 ENCOUNTER — ANESTHESIA (OUTPATIENT)
Dept: CARDIOLOGY | Facility: HOSPITAL | Age: 79
End: 2024-11-06
Payer: MEDICARE

## 2024-11-06 ENCOUNTER — ANESTHESIA EVENT (OUTPATIENT)
Dept: CARDIOLOGY | Facility: HOSPITAL | Age: 79
End: 2024-11-06
Payer: MEDICARE

## 2024-11-06 DIAGNOSIS — E78.5 HYPERLIPIDEMIA: ICD-10-CM

## 2024-11-06 DIAGNOSIS — I48.0 PAROXYSMAL ATRIAL FIBRILLATION (MULTI): Primary | ICD-10-CM

## 2024-11-06 DIAGNOSIS — I10 HYPERTENSION, UNSPECIFIED TYPE: ICD-10-CM

## 2024-11-06 DIAGNOSIS — I50.22 CHRONIC SYSTOLIC (CONGESTIVE) HEART FAILURE: ICD-10-CM

## 2024-11-06 DIAGNOSIS — I10 HYPERTENSION: ICD-10-CM

## 2024-11-06 DIAGNOSIS — Z98.890 S/P ABLATION OPERATION FOR ARRHYTHMIA: ICD-10-CM

## 2024-11-06 DIAGNOSIS — J44.9 COPD (CHRONIC OBSTRUCTIVE PULMONARY DISEASE) (MULTI): ICD-10-CM

## 2024-11-06 DIAGNOSIS — Z86.79 S/P ABLATION OPERATION FOR ARRHYTHMIA: ICD-10-CM

## 2024-11-06 LAB
GLUCOSE BLD MANUAL STRIP-MCNC: 190 MG/DL (ref 74–99)
GLUCOSE BLD MANUAL STRIP-MCNC: 222 MG/DL (ref 74–99)
GLUCOSE BLD MANUAL STRIP-MCNC: 285 MG/DL (ref 74–99)

## 2024-11-06 PROCEDURE — 93656 COMPRE EP EVAL ABLTJ ATR FIB: CPT | Performed by: STUDENT IN AN ORGANIZED HEALTH CARE EDUCATION/TRAINING PROGRAM

## 2024-11-06 PROCEDURE — 3700000001 HC GENERAL ANESTHESIA TIME - INITIAL BASE CHARGE: Performed by: STUDENT IN AN ORGANIZED HEALTH CARE EDUCATION/TRAINING PROGRAM

## 2024-11-06 PROCEDURE — C1730 CATH, EP, 19 OR FEW ELECT: HCPCS | Performed by: STUDENT IN AN ORGANIZED HEALTH CARE EDUCATION/TRAINING PROGRAM

## 2024-11-06 PROCEDURE — 93005 ELECTROCARDIOGRAM TRACING: CPT

## 2024-11-06 PROCEDURE — 3700000002 HC GENERAL ANESTHESIA TIME - EACH INCREMENTAL 1 MINUTE: Performed by: STUDENT IN AN ORGANIZED HEALTH CARE EDUCATION/TRAINING PROGRAM

## 2024-11-06 PROCEDURE — 7100000001 HC RECOVERY ROOM TIME - INITIAL BASE CHARGE: Performed by: STUDENT IN AN ORGANIZED HEALTH CARE EDUCATION/TRAINING PROGRAM

## 2024-11-06 PROCEDURE — 2500000004 HC RX 250 GENERAL PHARMACY W/ HCPCS (ALT 636 FOR OP/ED): Performed by: NURSE PRACTITIONER

## 2024-11-06 PROCEDURE — 76937 US GUIDE VASCULAR ACCESS: CPT | Performed by: STUDENT IN AN ORGANIZED HEALTH CARE EDUCATION/TRAINING PROGRAM

## 2024-11-06 PROCEDURE — 93655 ICAR CATH ABLTJ DSCRT ARRHYT: CPT | Performed by: STUDENT IN AN ORGANIZED HEALTH CARE EDUCATION/TRAINING PROGRAM

## 2024-11-06 PROCEDURE — A93656 PR EPHYS EVL TRNSPTL TX ATRIAL FIB ISOLAT PULM VEIN: Performed by: NURSE ANESTHETIST, CERTIFIED REGISTERED

## 2024-11-06 PROCEDURE — 7100000011 HC EXTENDED STAY RECOVERY HOURLY - NURSING UNIT

## 2024-11-06 PROCEDURE — RXMED WILLOW AMBULATORY MEDICATION CHARGE

## 2024-11-06 PROCEDURE — 93010 ELECTROCARDIOGRAM REPORT: CPT | Performed by: STUDENT IN AN ORGANIZED HEALTH CARE EDUCATION/TRAINING PROGRAM

## 2024-11-06 PROCEDURE — 82947 ASSAY GLUCOSE BLOOD QUANT: CPT

## 2024-11-06 PROCEDURE — 7100000002 HC RECOVERY ROOM TIME - EACH INCREMENTAL 1 MINUTE: Performed by: STUDENT IN AN ORGANIZED HEALTH CARE EDUCATION/TRAINING PROGRAM

## 2024-11-06 PROCEDURE — C1759 CATH, INTRA ECHOCARDIOGRAPHY: HCPCS | Performed by: STUDENT IN AN ORGANIZED HEALTH CARE EDUCATION/TRAINING PROGRAM

## 2024-11-06 PROCEDURE — 7100000010 HC PHASE TWO TIME - EACH INCREMENTAL 1 MINUTE: Performed by: STUDENT IN AN ORGANIZED HEALTH CARE EDUCATION/TRAINING PROGRAM

## 2024-11-06 PROCEDURE — 2780000003 HC OR 278 NO HCPCS: Performed by: STUDENT IN AN ORGANIZED HEALTH CARE EDUCATION/TRAINING PROGRAM

## 2024-11-06 PROCEDURE — 7100000009 HC PHASE TWO TIME - INITIAL BASE CHARGE: Performed by: STUDENT IN AN ORGANIZED HEALTH CARE EDUCATION/TRAINING PROGRAM

## 2024-11-06 PROCEDURE — C1751 CATH, INF, PER/CENT/MIDLINE: HCPCS | Performed by: STUDENT IN AN ORGANIZED HEALTH CARE EDUCATION/TRAINING PROGRAM

## 2024-11-06 PROCEDURE — 2500000001 HC RX 250 WO HCPCS SELF ADMINISTERED DRUGS (ALT 637 FOR MEDICARE OP): Performed by: NURSE PRACTITIONER

## 2024-11-06 PROCEDURE — 2500000004 HC RX 250 GENERAL PHARMACY W/ HCPCS (ALT 636 FOR OP/ED): Performed by: STUDENT IN AN ORGANIZED HEALTH CARE EDUCATION/TRAINING PROGRAM

## 2024-11-06 PROCEDURE — G0269 OCCLUSIVE DEVICE IN VEIN ART: HCPCS | Performed by: STUDENT IN AN ORGANIZED HEALTH CARE EDUCATION/TRAINING PROGRAM

## 2024-11-06 PROCEDURE — 99100 ANES PT EXTEME AGE<1 YR&>70: CPT | Performed by: ANESTHESIOLOGY

## 2024-11-06 PROCEDURE — C1760 CLOSURE DEV, VASC: HCPCS | Performed by: STUDENT IN AN ORGANIZED HEALTH CARE EDUCATION/TRAINING PROGRAM

## 2024-11-06 PROCEDURE — A93656 PR EPHYS EVL TRNSPTL TX ATRIAL FIB ISOLAT PULM VEIN: Performed by: ANESTHESIOLOGY

## 2024-11-06 PROCEDURE — 2500000004 HC RX 250 GENERAL PHARMACY W/ HCPCS (ALT 636 FOR OP/ED)

## 2024-11-06 PROCEDURE — 2720000007 HC OR 272 NO HCPCS: Performed by: STUDENT IN AN ORGANIZED HEALTH CARE EDUCATION/TRAINING PROGRAM

## 2024-11-06 PROCEDURE — C1766 INTRO/SHEATH,STRBLE,NON-PEEL: HCPCS | Performed by: STUDENT IN AN ORGANIZED HEALTH CARE EDUCATION/TRAINING PROGRAM

## 2024-11-06 PROCEDURE — 36620 INSERTION CATHETER ARTERY: CPT | Performed by: ANESTHESIOLOGY

## 2024-11-06 PROCEDURE — 92960 CARDIOVERSION ELECTRIC EXT: CPT | Performed by: STUDENT IN AN ORGANIZED HEALTH CARE EDUCATION/TRAINING PROGRAM

## 2024-11-06 PROCEDURE — 2500000002 HC RX 250 W HCPCS SELF ADMINISTERED DRUGS (ALT 637 FOR MEDICARE OP, ALT 636 FOR OP/ED): Performed by: NURSE PRACTITIONER

## 2024-11-06 RX ORDER — DEXTROSE 50 % IN WATER (D50W) INTRAVENOUS SYRINGE
25
Status: DISCONTINUED | OUTPATIENT
Start: 2024-11-06 | End: 2024-11-07 | Stop reason: HOSPADM

## 2024-11-06 RX ORDER — ATORVASTATIN CALCIUM 20 MG/1
20 TABLET, FILM COATED ORAL DAILY
Status: DISCONTINUED | OUTPATIENT
Start: 2024-11-06 | End: 2024-11-07 | Stop reason: HOSPADM

## 2024-11-06 RX ORDER — METOPROLOL SUCCINATE 25 MG/1
25 TABLET, EXTENDED RELEASE ORAL DAILY
Status: DISCONTINUED | OUTPATIENT
Start: 2024-11-07 | End: 2024-11-07 | Stop reason: HOSPADM

## 2024-11-06 RX ORDER — SODIUM CHLORIDE 9 MG/ML
INJECTION, SOLUTION INTRAVENOUS CONTINUOUS PRN
Status: DISCONTINUED | OUTPATIENT
Start: 2024-11-06 | End: 2024-11-06 | Stop reason: HOSPADM

## 2024-11-06 RX ORDER — SODIUM CHLORIDE, SODIUM LACTATE, POTASSIUM CHLORIDE, CALCIUM CHLORIDE 600; 310; 30; 20 MG/100ML; MG/100ML; MG/100ML; MG/100ML
100 INJECTION, SOLUTION INTRAVENOUS CONTINUOUS
Status: DISCONTINUED | OUTPATIENT
Start: 2024-11-06 | End: 2024-11-06

## 2024-11-06 RX ORDER — PROPOFOL 10 MG/ML
INJECTION, EMULSION INTRAVENOUS AS NEEDED
Status: DISCONTINUED | OUTPATIENT
Start: 2024-11-06 | End: 2024-11-06

## 2024-11-06 RX ORDER — HEPARIN SODIUM 1000 [USP'U]/ML
INJECTION, SOLUTION INTRAVENOUS; SUBCUTANEOUS AS NEEDED
Status: DISCONTINUED | OUTPATIENT
Start: 2024-11-06 | End: 2024-11-06 | Stop reason: HOSPADM

## 2024-11-06 RX ORDER — ACETAMINOPHEN 325 MG/1
975 TABLET ORAL ONCE
Status: DISCONTINUED | OUTPATIENT
Start: 2024-11-06 | End: 2024-11-07 | Stop reason: HOSPADM

## 2024-11-06 RX ORDER — FENTANYL CITRATE 50 UG/ML
INJECTION, SOLUTION INTRAMUSCULAR; INTRAVENOUS AS NEEDED
Status: DISCONTINUED | OUTPATIENT
Start: 2024-11-06 | End: 2024-11-06

## 2024-11-06 RX ORDER — MORPHINE SULFATE 2 MG/ML
2 INJECTION, SOLUTION INTRAMUSCULAR; INTRAVENOUS EVERY 5 MIN PRN
Status: DISCONTINUED | OUTPATIENT
Start: 2024-11-06 | End: 2024-11-07 | Stop reason: HOSPADM

## 2024-11-06 RX ORDER — HYDRALAZINE HYDROCHLORIDE 20 MG/ML
10 INJECTION INTRAMUSCULAR; INTRAVENOUS EVERY 30 MIN PRN
Status: DISCONTINUED | OUTPATIENT
Start: 2024-11-06 | End: 2024-11-07 | Stop reason: HOSPADM

## 2024-11-06 RX ORDER — ALBUTEROL SULFATE 90 UG/1
1-2 INHALANT RESPIRATORY (INHALATION) EVERY 2 HOUR PRN
Status: DISCONTINUED | OUTPATIENT
Start: 2024-11-06 | End: 2024-11-07 | Stop reason: HOSPADM

## 2024-11-06 RX ORDER — LIDOCAINE HCL/PF 100 MG/5ML
SYRINGE (ML) INTRAVENOUS AS NEEDED
Status: DISCONTINUED | OUTPATIENT
Start: 2024-11-06 | End: 2024-11-06

## 2024-11-06 RX ORDER — PHENYLEPHRINE 10 MG/250 ML(40 MCG/ML)IN 0.9 % SOD.CHLORIDE INTRAVENOUS
CONTINUOUS PRN
Status: DISCONTINUED | OUTPATIENT
Start: 2024-11-06 | End: 2024-11-06

## 2024-11-06 RX ORDER — FAMOTIDINE 10 MG/ML
20 INJECTION INTRAVENOUS ONCE
Status: DISCONTINUED | OUTPATIENT
Start: 2024-11-06 | End: 2024-11-07 | Stop reason: HOSPADM

## 2024-11-06 RX ORDER — ROCURONIUM BROMIDE 10 MG/ML
INJECTION, SOLUTION INTRAVENOUS AS NEEDED
Status: DISCONTINUED | OUTPATIENT
Start: 2024-11-06 | End: 2024-11-06

## 2024-11-06 RX ORDER — HYDROMORPHONE HYDROCHLORIDE 1 MG/ML
1 INJECTION, SOLUTION INTRAMUSCULAR; INTRAVENOUS; SUBCUTANEOUS EVERY 5 MIN PRN
Status: DISCONTINUED | OUTPATIENT
Start: 2024-11-06 | End: 2024-11-07 | Stop reason: HOSPADM

## 2024-11-06 RX ORDER — AMIODARONE HYDROCHLORIDE 200 MG/1
200 TABLET ORAL DAILY
Status: DISCONTINUED | OUTPATIENT
Start: 2024-11-06 | End: 2024-11-07 | Stop reason: HOSPADM

## 2024-11-06 RX ORDER — PROTAMINE SULFATE 10 MG/ML
INJECTION, SOLUTION INTRAVENOUS AS NEEDED
Status: DISCONTINUED | OUTPATIENT
Start: 2024-11-06 | End: 2024-11-06

## 2024-11-06 RX ORDER — HEPARIN SODIUM 200 [USP'U]/100ML
INJECTION, SOLUTION INTRAVENOUS CONTINUOUS PRN
Status: DISCONTINUED | OUTPATIENT
Start: 2024-11-06 | End: 2024-11-06 | Stop reason: HOSPADM

## 2024-11-06 RX ORDER — DEXTROSE 50 % IN WATER (D50W) INTRAVENOUS SYRINGE
12.5
Status: DISCONTINUED | OUTPATIENT
Start: 2024-11-06 | End: 2024-11-07 | Stop reason: HOSPADM

## 2024-11-06 RX ORDER — VENLAFAXINE HYDROCHLORIDE 75 MG/1
150 CAPSULE, EXTENDED RELEASE ORAL NIGHTLY
Status: DISCONTINUED | OUTPATIENT
Start: 2024-11-06 | End: 2024-11-07 | Stop reason: HOSPADM

## 2024-11-06 RX ORDER — LABETALOL HYDROCHLORIDE 5 MG/ML
10 INJECTION, SOLUTION INTRAVENOUS ONCE AS NEEDED
Status: DISCONTINUED | OUTPATIENT
Start: 2024-11-06 | End: 2024-11-07 | Stop reason: HOSPADM

## 2024-11-06 RX ORDER — PANTOPRAZOLE SODIUM 40 MG/1
40 TABLET, DELAYED RELEASE ORAL
Status: DISCONTINUED | OUTPATIENT
Start: 2024-11-07 | End: 2024-11-07 | Stop reason: HOSPADM

## 2024-11-06 RX ORDER — ONDANSETRON HYDROCHLORIDE 2 MG/ML
INJECTION, SOLUTION INTRAVENOUS AS NEEDED
Status: DISCONTINUED | OUTPATIENT
Start: 2024-11-06 | End: 2024-11-06

## 2024-11-06 RX ORDER — ONDANSETRON HYDROCHLORIDE 2 MG/ML
4 INJECTION, SOLUTION INTRAVENOUS ONCE AS NEEDED
Status: DISCONTINUED | OUTPATIENT
Start: 2024-11-06 | End: 2024-11-07 | Stop reason: HOSPADM

## 2024-11-06 RX ORDER — SPIRONOLACTONE 25 MG/1
25 TABLET ORAL DAILY
Status: DISCONTINUED | OUTPATIENT
Start: 2024-11-06 | End: 2024-11-07 | Stop reason: HOSPADM

## 2024-11-06 RX ORDER — AZELASTINE 1 MG/ML
1 SPRAY, METERED NASAL 2 TIMES DAILY
Status: DISCONTINUED | OUTPATIENT
Start: 2024-11-06 | End: 2024-11-07 | Stop reason: HOSPADM

## 2024-11-06 RX ORDER — COLCHICINE 0.6 MG/1
0.6 TABLET ORAL DAILY
Qty: 7 TABLET | Refills: 0 | Status: SHIPPED | OUTPATIENT
Start: 2024-11-06 | End: 2024-11-14

## 2024-11-06 RX ORDER — PHENYLEPHRINE HCL IN 0.9% NACL 1 MG/10 ML
SYRINGE (ML) INTRAVENOUS AS NEEDED
Status: DISCONTINUED | OUTPATIENT
Start: 2024-11-06 | End: 2024-11-06

## 2024-11-06 RX ORDER — LEVOTHYROXINE SODIUM 50 UG/1
50 TABLET ORAL DAILY
Status: DISCONTINUED | OUTPATIENT
Start: 2024-11-06 | End: 2024-11-07 | Stop reason: HOSPADM

## 2024-11-06 RX ORDER — MIDAZOLAM HYDROCHLORIDE 1 MG/ML
1 INJECTION, SOLUTION INTRAMUSCULAR; INTRAVENOUS ONCE AS NEEDED
Status: DISCONTINUED | OUTPATIENT
Start: 2024-11-06 | End: 2024-11-07 | Stop reason: HOSPADM

## 2024-11-06 RX ORDER — PANTOPRAZOLE SODIUM 40 MG/1
40 TABLET, DELAYED RELEASE ORAL DAILY
Qty: 30 TABLET | Refills: 0 | Status: SHIPPED | OUTPATIENT
Start: 2024-11-06 | End: 2024-11-07 | Stop reason: HOSPADM

## 2024-11-06 RX ORDER — HYDRALAZINE HYDROCHLORIDE 20 MG/ML
5 INJECTION INTRAMUSCULAR; INTRAVENOUS ONCE
Status: COMPLETED | OUTPATIENT
Start: 2024-11-06 | End: 2024-11-06

## 2024-11-06 RX ORDER — LISINOPRIL 40 MG/1
40 TABLET ORAL DAILY
Status: DISCONTINUED | OUTPATIENT
Start: 2024-11-07 | End: 2024-11-07 | Stop reason: HOSPADM

## 2024-11-06 RX ORDER — ALBUTEROL SULFATE 0.83 MG/ML
2.5 SOLUTION RESPIRATORY (INHALATION) ONCE AS NEEDED
Status: DISCONTINUED | OUTPATIENT
Start: 2024-11-06 | End: 2024-11-07 | Stop reason: HOSPADM

## 2024-11-06 RX ORDER — METOPROLOL TARTRATE 1 MG/ML
5 INJECTION, SOLUTION INTRAVENOUS ONCE
Status: DISCONTINUED | OUTPATIENT
Start: 2024-11-06 | End: 2024-11-07 | Stop reason: HOSPADM

## 2024-11-06 RX ORDER — SCOLOPAMINE TRANSDERMAL SYSTEM 1 MG/1
1 PATCH, EXTENDED RELEASE TRANSDERMAL ONCE
Status: DISCONTINUED | OUTPATIENT
Start: 2024-11-06 | End: 2024-11-07 | Stop reason: HOSPADM

## 2024-11-06 RX ORDER — LISINOPRIL 20 MG/1
20 TABLET ORAL ONCE
Status: COMPLETED | OUTPATIENT
Start: 2024-11-06 | End: 2024-11-06

## 2024-11-06 RX ORDER — INSULIN LISPRO 100 [IU]/ML
0-5 INJECTION, SOLUTION INTRAVENOUS; SUBCUTANEOUS
Status: DISCONTINUED | OUTPATIENT
Start: 2024-11-06 | End: 2024-11-07 | Stop reason: HOSPADM

## 2024-11-06 RX ORDER — DIPHENHYDRAMINE HYDROCHLORIDE 50 MG/ML
25 INJECTION INTRAMUSCULAR; INTRAVENOUS ONCE AS NEEDED
Status: DISCONTINUED | OUTPATIENT
Start: 2024-11-06 | End: 2024-11-07 | Stop reason: HOSPADM

## 2024-11-06 RX ORDER — ALBUTEROL SULFATE 90 UG/1
1-2 INHALANT RESPIRATORY (INHALATION) EVERY 6 HOURS PRN
Status: DISCONTINUED | OUTPATIENT
Start: 2024-11-06 | End: 2024-11-06

## 2024-11-06 RX ORDER — ACETAMINOPHEN 325 MG/1
650 TABLET ORAL EVERY 4 HOURS PRN
Status: DISCONTINUED | OUTPATIENT
Start: 2024-11-06 | End: 2024-11-07 | Stop reason: HOSPADM

## 2024-11-06 SDOH — ECONOMIC STABILITY: FOOD INSECURITY: HOW HARD IS IT FOR YOU TO PAY FOR THE VERY BASICS LIKE FOOD, HOUSING, MEDICAL CARE, AND HEATING?: NOT VERY HARD

## 2024-11-06 SDOH — ECONOMIC STABILITY: INCOME INSECURITY: IN THE PAST 12 MONTHS HAS THE ELECTRIC, GAS, OIL, OR WATER COMPANY THREATENED TO SHUT OFF SERVICES IN YOUR HOME?: NO

## 2024-11-06 SDOH — SOCIAL STABILITY: SOCIAL INSECURITY
WITHIN THE LAST YEAR, HAVE YOU BEEN KICKED, HIT, SLAPPED, OR OTHERWISE PHYSICALLY HURT BY YOUR PARTNER OR EX-PARTNER?: NO

## 2024-11-06 SDOH — SOCIAL STABILITY: SOCIAL INSECURITY: WITHIN THE LAST YEAR, HAVE YOU BEEN HUMILIATED OR EMOTIONALLY ABUSED IN OTHER WAYS BY YOUR PARTNER OR EX-PARTNER?: NO

## 2024-11-06 SDOH — SOCIAL STABILITY: SOCIAL INSECURITY: WERE YOU ABLE TO COMPLETE ALL THE BEHAVIORAL HEALTH SCREENINGS?: YES

## 2024-11-06 SDOH — ECONOMIC STABILITY: HOUSING INSECURITY: IN THE PAST 12 MONTHS, HOW MANY TIMES HAVE YOU MOVED WHERE YOU WERE LIVING?: 1

## 2024-11-06 SDOH — SOCIAL STABILITY: SOCIAL INSECURITY
WITHIN THE LAST YEAR, HAVE YOU BEEN RAPED OR FORCED TO HAVE ANY KIND OF SEXUAL ACTIVITY BY YOUR PARTNER OR EX-PARTNER?: NO

## 2024-11-06 SDOH — ECONOMIC STABILITY: HOUSING INSECURITY: IN THE LAST 12 MONTHS, WAS THERE A TIME WHEN YOU WERE NOT ABLE TO PAY THE MORTGAGE OR RENT ON TIME?: NO

## 2024-11-06 SDOH — SOCIAL STABILITY: SOCIAL INSECURITY: HAVE YOU HAD THOUGHTS OF HARMING ANYONE ELSE?: NO

## 2024-11-06 SDOH — ECONOMIC STABILITY: TRANSPORTATION INSECURITY: IN THE PAST 12 MONTHS, HAS LACK OF TRANSPORTATION KEPT YOU FROM MEDICAL APPOINTMENTS OR FROM GETTING MEDICATIONS?: NO

## 2024-11-06 SDOH — ECONOMIC STABILITY: FOOD INSECURITY: WITHIN THE PAST 12 MONTHS, YOU WORRIED THAT YOUR FOOD WOULD RUN OUT BEFORE YOU GOT THE MONEY TO BUY MORE.: NEVER TRUE

## 2024-11-06 SDOH — SOCIAL STABILITY: SOCIAL INSECURITY: ARE YOU OR HAVE YOU BEEN THREATENED OR ABUSED PHYSICALLY, EMOTIONALLY, OR SEXUALLY BY ANYONE?: NO

## 2024-11-06 SDOH — ECONOMIC STABILITY: HOUSING INSECURITY: AT ANY TIME IN THE PAST 12 MONTHS, WERE YOU HOMELESS OR LIVING IN A SHELTER (INCLUDING NOW)?: NO

## 2024-11-06 SDOH — ECONOMIC STABILITY: FOOD INSECURITY: WITHIN THE PAST 12 MONTHS, THE FOOD YOU BOUGHT JUST DIDN'T LAST AND YOU DIDN'T HAVE MONEY TO GET MORE.: NEVER TRUE

## 2024-11-06 SDOH — SOCIAL STABILITY: SOCIAL INSECURITY: DO YOU FEEL ANYONE HAS EXPLOITED OR TAKEN ADVANTAGE OF YOU FINANCIALLY OR OF YOUR PERSONAL PROPERTY?: NO

## 2024-11-06 SDOH — SOCIAL STABILITY: SOCIAL INSECURITY: HAS ANYONE EVER THREATENED TO HURT YOUR FAMILY OR YOUR PETS?: NO

## 2024-11-06 SDOH — SOCIAL STABILITY: SOCIAL INSECURITY: WITHIN THE LAST YEAR, HAVE YOU BEEN AFRAID OF YOUR PARTNER OR EX-PARTNER?: NO

## 2024-11-06 SDOH — HEALTH STABILITY: MENTAL HEALTH: CURRENT SMOKER: 0

## 2024-11-06 SDOH — SOCIAL STABILITY: SOCIAL INSECURITY: HAVE YOU HAD ANY THOUGHTS OF HARMING ANYONE ELSE?: NO

## 2024-11-06 SDOH — SOCIAL STABILITY: SOCIAL INSECURITY: DOES ANYONE TRY TO KEEP YOU FROM HAVING/CONTACTING OTHER FRIENDS OR DOING THINGS OUTSIDE YOUR HOME?: NO

## 2024-11-06 SDOH — SOCIAL STABILITY: SOCIAL INSECURITY: ARE THERE ANY APPARENT SIGNS OF INJURIES/BEHAVIORS THAT COULD BE RELATED TO ABUSE/NEGLECT?: NO

## 2024-11-06 SDOH — SOCIAL STABILITY: SOCIAL INSECURITY: DO YOU FEEL UNSAFE GOING BACK TO THE PLACE WHERE YOU ARE LIVING?: NO

## 2024-11-06 SDOH — SOCIAL STABILITY: SOCIAL INSECURITY: ABUSE: ADULT

## 2024-11-06 ASSESSMENT — ACTIVITIES OF DAILY LIVING (ADL)
WALKS IN HOME: INDEPENDENT
ADEQUATE_TO_COMPLETE_ADL: YES
FEEDING YOURSELF: INDEPENDENT
TOILETING: INDEPENDENT
JUDGMENT_ADEQUATE_SAFELY_COMPLETE_DAILY_ACTIVITIES: YES
DRESSING YOURSELF: INDEPENDENT
GROOMING: INDEPENDENT
BATHING: INDEPENDENT
HEARING - RIGHT EAR: FUNCTIONAL
PATIENT'S MEMORY ADEQUATE TO SAFELY COMPLETE DAILY ACTIVITIES?: YES
HEARING - LEFT EAR: FUNCTIONAL
LACK_OF_TRANSPORTATION: NO

## 2024-11-06 ASSESSMENT — COGNITIVE AND FUNCTIONAL STATUS - GENERAL
STANDING UP FROM CHAIR USING ARMS: A LITTLE
DAILY ACTIVITIY SCORE: 23
MOBILITY SCORE: 20
MOVING TO AND FROM BED TO CHAIR: A LITTLE
DRESSING REGULAR LOWER BODY CLOTHING: A LITTLE
WALKING IN HOSPITAL ROOM: A LITTLE
CLIMB 3 TO 5 STEPS WITH RAILING: A LITTLE
PATIENT BASELINE BEDBOUND: NO

## 2024-11-06 ASSESSMENT — PAIN - FUNCTIONAL ASSESSMENT
PAIN_FUNCTIONAL_ASSESSMENT: 0-10

## 2024-11-06 ASSESSMENT — PAIN SCALES - GENERAL
PAINLEVEL_OUTOF10: 0 - NO PAIN
PAIN_LEVEL: 0
PAINLEVEL_OUTOF10: 0 - NO PAIN

## 2024-11-06 ASSESSMENT — LIFESTYLE VARIABLES
HOW OFTEN DO YOU HAVE A DRINK CONTAINING ALCOHOL: NEVER
SKIP TO QUESTIONS 9-10: 1
HOW MANY STANDARD DRINKS CONTAINING ALCOHOL DO YOU HAVE ON A TYPICAL DAY: PATIENT DOES NOT DRINK
AUDIT-C TOTAL SCORE: 0
HOW OFTEN DO YOU HAVE 6 OR MORE DRINKS ON ONE OCCASION: NEVER
AUDIT-C TOTAL SCORE: 0

## 2024-11-06 ASSESSMENT — COLUMBIA-SUICIDE SEVERITY RATING SCALE - C-SSRS
6. HAVE YOU EVER DONE ANYTHING, STARTED TO DO ANYTHING, OR PREPARED TO DO ANYTHING TO END YOUR LIFE?: NO
2. HAVE YOU ACTUALLY HAD ANY THOUGHTS OF KILLING YOURSELF?: NO
1. IN THE PAST MONTH, HAVE YOU WISHED YOU WERE DEAD OR WISHED YOU COULD GO TO SLEEP AND NOT WAKE UP?: NO

## 2024-11-06 ASSESSMENT — PATIENT HEALTH QUESTIONNAIRE - PHQ9
1. LITTLE INTEREST OR PLEASURE IN DOING THINGS: NOT AT ALL
SUM OF ALL RESPONSES TO PHQ9 QUESTIONS 1 & 2: 0
2. FEELING DOWN, DEPRESSED OR HOPELESS: NOT AT ALL

## 2024-11-06 NOTE — H&P
History and Physical   Pre Surgical Review (< 30 days)      History & Physical Reviewed    I have reviewed the History and Physical dated:  10/15/24   History and Physical reviewed and relevant findings noted. Patient examined to review pertinent physical  findings.: No significant changes   Home Medications Reviewed: see medication list below   Allergies Reviewed: no changes noted      Home Medications  Current Outpatient Medications   Medication Instructions    albuterol 90 mcg/actuation inhaler 1-2 puffs, inhalation, Every 6 hours PRN    amiodarone (PACERONE) 200 mg, oral, Daily    apixaban (ELIQUIS) 5 mg, oral, 2 times daily    atorvastatin (LIPITOR) 20 mg, oral, Daily    azelastine (Astelin) 137 mcg (0.1 %) nasal spray 1 spray, Does not apply, 2 times daily    esomeprazole (NEXIUM) 40 mg, oral, Daily before breakfast    flash glucose sensor kit (FreeStyle Aneesh 2 Sensor) kit Use as instructed 1 device every 14 days    FreeStyle Aneesh reader (FreeStyle Aneesh 2 [place]) misc Use as instructed    glipiZIDE (GLUCOTROL) 5 mg, oral, 2 times daily before meals    insulin lispro (HumaLOG KwikPen Insulin) 100 unit/mL injection Inject 3 times daily using sliding scale: 0-150 no units, 151-200 2 units, 201-250 4 units, 251-300 6 units, 301-350 8 units, 351-400 10 units, above 400 call Dr. Ben Bishop 30 units daily    levothyroxine (SYNTHROID, LEVOXYL) 50 mcg, oral, Daily    lisinopril 40 mg, oral, Daily    metFORMIN XR (GLUCOPHAGE-XR) 1,000 mg, oral, 2 times daily (morning and late afternoon)    metoprolol succinate XL (TOPROL-XL) 100 mg, oral, Every 12 hours, Please monitor your heart rate and blood pressure before taking second dose. Avoid taking if your blood pressure or heart rate are too low    spironolactone (ALDACTONE) 25 mg, oral, Daily    venlafaxine XR (EFFEXOR-XR) 150 mg, oral, Nightly        Allergies  Allergies   Allergen Reactions    Metformin Headache and GI Upset     Abdominal pain    Mirtazapine  Hives    Rosuvastatin Myalgia     Muscle spasm/weakness    Simvastatin Myalgia     Muscle spasms/weakness  Leg cramps     Sulfamethoxazole-Trimethoprim Hives    Jardiance [Empagliflozin] Other    Alendronic Acid Other    Clarithromycin Other     weak    Fosamax [Alendronate] Other     weak    Nitrofurantoin Monohyd/M-Cryst Other     weak    Ofloxacin Other     weakness       Physical Exam  Physical Exam  Constitutional:       General: She is not in acute distress.  Cardiovascular:      Rate and Rhythm: Rhythm irregular.      Comments: + pulses all extremities.  Pulmonary:      Effort: Pulmonary effort is normal. No respiratory distress.      Comments: Clear bilaterally.  Abdominal:      General: Bowel sounds are normal.   Skin:     General: Skin is warm and dry.   Neurological:      General: No focal deficit present.      Mental Status: She is alert and oriented to person, place, and time.   Psychiatric:         Mood and Affect: Mood normal.         Behavior: Behavior normal.        Airway/Sedation Assessment     Assessment by anesthesia See anesthesia airway/sedation assessment     ·  Mouth Opening OK See anesthesia airway/sedation assessment      Neck Flexibility OK See anesthesia airway/sedation assessment      Loose Teeth See anesthesia airway/sedation assessment   ·  Oropharyngeal Classification See anesthesia airway/sedation assessment   ·  ASA PS Classification ASA III - Patient with severe systemic disease       Sedation Plan See anesthesia airway/sedation assessment     Risks, benefits, and alternatives discussed with patient.     ERAS (Enhanced Recovery After Surgery):  ·  ERAS Patient: No      Consent:   COVID-19 Consent:  ·  COVID-19 Risk Consent Surgeon has reviewed key risks related to the risk of jack COVID-19 and if they contract COVID-19 what the risks are.     Evie Pickens, APRN-CNP

## 2024-11-06 NOTE — Clinical Note
Accessed site: right femoral vein.   Ultrasound guidance was used. 18 gauge access needle, second 0.035 J wire advanced

## 2024-11-06 NOTE — Clinical Note
Sheath was removed in the right femoral vein. Site closed by Perclose. First perclose suture broke, second perclose used and also broke, third perclose used

## 2024-11-06 NOTE — NURSING NOTE
1600: patient transported to room; report called and patient was able to talk to Dr Tucker prior to leaving recovery; patient left safely via wheelchair with her belongings. Bilateral groin were clean, dry and intact, no hematoma. Patient ambulated to and from the bathroom with no complaints of lightheadedness or dizziness.

## 2024-11-06 NOTE — DISCHARGE INSTRUCTIONS
DISCHARGE INSTRUCTIONS     FOR SUDDEN AND SEVERE CHEST PAIN, SHORTNESS OF BREATH, EXCESSIVE BLEEDING, SIGNS OF STROKE, OR CHANGES IN MENTAL STATUS YOU SHOULD CALL 911 IMMEDIATELY.     If your provider has prescribed aspirin and/or clopidogrel (Plavix), or prasugrel (Effient), or ticagrelor (Brilinta), DO NOT STOP THESE MEDICATIONS for any reason without talking to your cardiologist first. If any of these were prescribed, you must take them every day without missing a single dose. If you are getting low on these medications, contact your provider immediately for a refill.     FOR NEXT 24 HOURS  - Upon discharge, you should return home and rest for the remainder of the day and evening. You do not have to stay on bed rest but should not be very active.  It is recommended a responsible adult be with you for the first 24 hours after the procedure.    - No driving for 24 hours after procedure. Please arrange for someone to drive you home from the hospital today.     - Do not drive, operate machinery, or use power tools for 24 hours after your procedure.     - Do not make any legal decisions for 24 hours after your procedure.     - Do not drink alcoholic beverages for 24 hours after your procedure.    WOUND CARE   *FOR FEMORAL (LEG) ACCESS*  ·      Avoid heavy lifting (over 10 pounds) for 7 days, squatting or excessive bending for 7 days, and strenuous exercise for 7 days.  ·      No submerged bathing, swimming, or hot tubs for the next 7 days, or until fully healed.  ·      Avoid sexual activity for 3-4 days until any groin discomfort has ceased.    - The transparent dressing should be removed from the site 24 hours after the procedure.  Wash the site gently with soap and water. Rinse well and pat dry. Keep the area clean and dry. You may apply a Band-Aid to the site. Avoid lotions, ointments, or powders until fully healed.     - You may shower the day after your procedure.      - It is normal to notice a small  bruise around the puncture site and/or a small grape sized or smaller lump. Any large bruising or large lump warrants a call to the office.     - If bleeding should occur, lay down and apply pressure to the affected area for 10 minutes.  If the bleeding stops notify your physician.  If there is a large amount of bleeding or spurting of blood CALL 911 immediately.  DO NOT drive yourself to the hospital.    - You may experience some tenderness, bruising or minimal inflammation.  If you have any concerns, you may contact the Cath Lab or if any of these symptoms become excessive, contact your cardiologist or go to the emergency room.     OTHER INSTRUCTIONS  - You may take acetaminophen (Tylenol) as directed for discomfort.  If pain is not relieved with acetaminophen (Tylenol), contact your doctor.    - If you notice or experience any of the following, you should notify your doctor or seek medical attention  Chest pain or discomfort  Change in mental status or weakness in extremities.  Dizziness, light headedness, or feeling faint.  Change in the site where the procedure was performed, such as bleeding or an increased area of bruising or swelling.  Tingling, numbness, pain, or coolness in the leg/arm beyond the site where the procedure was performed.  Signs of infection (i.e. shaking chills, temperature > 100 degrees Fahrenheit, warmth, redness) in the leg/arm area where the procedure was performed.  Changes in urination   Bloody or black stools  Vomiting blood  Severe nose bleeds  Any excessive bleeding    - Please follow up with your primary care physician in one week for home oxygen.    - Please stop metoprolol.    - Please start colchicine 0.6 mg daily for 7 days.    - Please follow up with Dr. Tucker as scheduled or sooner if needed, [phone].

## 2024-11-06 NOTE — POST-PROCEDURE NOTE
Physician Transition of Care Summary  Cardiac Electrophysiology Lab/OR    Procedure Date: 11/6/2024  Attending:    Brett Tucker - Primary    Resident/Fellow/Other Assistant: Surgeons and Role:  * No surgeons found with a matching role *    Indications:   Pre-op Diagnosis      * Paroxysmal atrial fibrillation (Multi) [I48.0]     * Hypertension [I10]     * Hyperlipidemia [E78.5]     * Chronic systolic (congestive) heart failure [I50.22]    Post-procedure diagnosis:   Post-op Diagnosis     * Paroxysmal atrial fibrillation (Multi) [I48.0]     * Hypertension [I10]     * Hyperlipidemia [E78.5]     * Chronic systolic (congestive) heart failure [I50.22]    Procedure(s):   Ablation A-Fib Persistant (21901)  32901 - [place] COMPRE EP EVAL ABLTJ ATR FIB PULM VEIN ISOLATION      Summary:  Acutely successful radiofrequency ablation for Atrial fibrillation - Pulmonary vein isolation  Acutely successful radiofrequency ablation for Typical atrial flutter - CTI ablation  Vascular closure: RFV: Perclose x2; LFV: Vascade MVP x1    Recommendation:  Patient to be admitted under extended observation overnight. Tentatively patient will be discharged tomorrow following bed rest and subsequent ambulation, provided the recovery parameters are appropriate.   Resume AC Apixaban 5mg BID @ 1800 on 11/6/24. Please DO NOT hold blood thinner unless emergency without asking your doctor.  Bedrest for 2 hours post sheath removal  Start Protonix 40mg daily x30days  Start Colchicine 0.6mg Daily x7 days  Decrease Toprol XL to 25mg Daily. Resume rest of home medications    Patient Instructions:  No driving, alcohol or making legal decisions for 24 hours.  Remove band-aid/tegaderm in 1 day.  No heavy lifting, strenuous exercise for 1 week  Please call our office if you notice any groin discharge or swelling or fever.   For cardiology electrophysiology emergencies (such as severe heart racing, bleeding, severe groin pain/swelling, high fever, wound discharge,  "stroke symptoms, or recent severe chest pain) call [facility]: [phone]    For full procedure note/study review please go to \"Chart review\" --> \"Cardiology\" tab --> Electrophysiology procedure for 11/6/2024    Complications:   None    Stents/Implants:   Implants       No implant documentation for this case.            Anticoagulation/Antiplatelet Plan:   See above in recommendations - Resume Apixaban 5mg BID     Estimated Blood Loss:   * No values recorded between 5/31/2024  2:17 PM and 5/31/2024  7:50 PM *    Anesthesia: General Anesthesia Staff: Anesthesiologist: Gerardo Ferguson MD  CRNA: TAD Watts-CRNA, KAVITHA  SRNA: Margret Dailey    Any Specimen(s) Removed:   No specimens collected during this procedure.    Disposition:   Admit for extended observation, with anticipated discharge 1/7/24    Hank Tucker MD [facility]  Cardiac Electrophysiology    **Disclaimer: This note was dictated by speech recognition, and every effort has been made to prevent any error in transcription, however minor errors may be present**    "

## 2024-11-06 NOTE — ANESTHESIA PROCEDURE NOTES
Arterial Line:    Date/Time: 11/6/2024 9:14 AM    Staffing  Performed: CRNA   Authorized by: Gerardo Ferguson MD    Performed by: Margret Dailey    An arterial line was placed. Procedure performed using ultrasound guidance.in the OR for the following indication(s): continuous blood pressure monitoring and blood sampling needed.    A 20 gauge (size), 8 cm (length), Arrow (type) catheter was placed into the Right brachial artery, secured by Tegaderm,   Seldinger technique not used.  Events:  patient tolerated procedure well with no complications.      Additional notes:  Radial artery was attempted first, but unable to advance wire. Brachial artery with ultrasound used on second attempt with success.

## 2024-11-06 NOTE — Clinical Note
Accessed site: left femoral vein.   Ultrasound guidance was used. 18 gauge access needle, 0.035 J wire advanced

## 2024-11-06 NOTE — ANESTHESIA PREPROCEDURE EVALUATION
Patient: Marissa Rebolledo    Procedure Information       Date/Time: 11/06/24 0830    Procedure: Ablation A-Fib Persistant (52478) - Admit obs overnight, 8:00AM    Location: [facility] / [facility]    Providers: Hank Tucker MD            Relevant Problems   Anesthesia   (-) Difficult intubation   (-) PONV (postoperative nausea and vomiting)      Cardiac   (+) Atrial fibrillation (Multi)   (+) Hyperlipidemia   (+) Hypertension   (+) Nonrheumatic mitral (valve) insufficiency   (+) Paroxysmal atrial fibrillation (Multi)      Neuro   (+) Anxiety      GI   (+) GERD (gastroesophageal reflux disease)      Endocrine   (+) Diabetes mellitus, type 2 (Multi)   (+) Hypothyroidism      Hematology   (+) Iron deficiency anemia       Clinical information reviewed:   Tobacco  Allergies  Meds   Med Hx  Surg Hx   Fam Hx  Soc Hx        NPO Detail:  NPO/Void Status  Date of Last Liquid: 11/05/24  Time of Last Liquid: 2000  Date of Last Solid: 11/05/24  Time of Last Solid: 2000         Physical Exam    Airway  Mallampati: I  TM distance: >3 FB  Neck ROM: full     Cardiovascular - normal exam  Rhythm: regular  Rate: normal     Dental   (+) lower dentures, upper dentures     Pulmonary - normal exam     Abdominal            Anesthesia Plan    History of general anesthesia?: yes  History of complications of general anesthesia?: no    ASA 3     general     The patient is not a current smoker.  Education provided regarding risk of obstructive sleep apnea.  intravenous induction   Postoperative administration of opioids is intended.  Trial extubation is planned.  Anesthetic plan and risks discussed with patient.    Plan discussed with CRNA, CAA and attending.

## 2024-11-06 NOTE — Clinical Note
Accessed site: right femoral vein.   Ultrasound guidance was used. 18 gauge access needle, 0.035 J wire advanced

## 2024-11-06 NOTE — ANESTHESIA PROCEDURE NOTES
Airway  Date/Time: 11/6/2024 8:52 AM  Urgency: elective    Airway not difficult    Staffing  Performed: RONALDO   Authorized by: Gerardo Ferguson MD    Performed by: Margret Dailey  Patient location during procedure: OR    Indications and Patient Condition  Indications for airway management: anesthesia and airway protection  Preoxygenated: yes  Mask difficulty assessment: 2 - vent by mask + OA or adjuvant +/- NMBA  Planned trial extubation    Final Airway Details  Final airway type: endotracheal airway      Successful airway: ETT  Cuffed: yes   Successful intubation technique: direct laryngoscopy  Facilitating devices/methods: intubating stylet  Endotracheal tube insertion site: oral  Blade: Kelsey  Blade size: #3  ETT size (mm): 7.0  Cormack-Lehane Classification: grade IIa - partial view of glottis  Placement verified by: chest auscultation and capnometry   Cuff volume (mL): 8  Measured from: lips  ETT to lips (cm): 21  Number of attempts at approach: 1

## 2024-11-06 NOTE — ANESTHESIA POSTPROCEDURE EVALUATION
Patient: Marissa Rebolledo    Procedure Summary       Date: 11/06/24 Room / Location: [facility] / [facility]    Anesthesia Start: 0844 Anesthesia Stop: 1208    Procedure: Ablation A-Fib Persistant (26978) Diagnosis:       Paroxysmal atrial fibrillation (Multi)      Hypertension      Hyperlipidemia      Chronic systolic (congestive) heart failure      (Paroxysmal atrial fibrillation (Multi) [I48.0])      (Hypertension [I10])      (Hyperlipidemia [E78.5])      (Chronic systolic (congestive) heart failure [I50.22])    Providers: Hank Tucker MD Responsible Provider: Gerardo Ferguson MD    Anesthesia Type: general ASA Status: 3            Anesthesia Type: general    Vitals Value Taken Time   /80 11/06/24 1209   Temp 36 °C (96.8 °F) 11/06/24 1205   Pulse 60 11/06/24 1208   Resp 16 11/06/24 1205   SpO2 100 % 11/06/24 1208   Vitals shown include unfiled device data.    Anesthesia Post Evaluation    Patient location during evaluation: PACU  Patient participation: complete - patient participated  Level of consciousness: sleepy but conscious  Pain score: 0  Pain management: adequate  Airway patency: patent  Cardiovascular status: acceptable  Respiratory status: acceptable and face mask  Hydration status: acceptable  Postoperative Nausea and Vomiting: none        There were no known notable events for this encounter.

## 2024-11-06 NOTE — Clinical Note
Sheath was removed in the left femoral vein. Site closed by Perclose. Perclose failed, will use vascade

## 2024-11-07 ENCOUNTER — APPOINTMENT (OUTPATIENT)
Dept: CARDIOLOGY | Facility: HOSPITAL | Age: 79
End: 2024-11-07
Payer: MEDICARE

## 2024-11-07 ENCOUNTER — PHARMACY VISIT (OUTPATIENT)
Dept: PHARMACY | Facility: CLINIC | Age: 79
End: 2024-11-07
Payer: COMMERCIAL

## 2024-11-07 VITALS
BODY MASS INDEX: 29.94 KG/M2 | DIASTOLIC BLOOD PRESSURE: 60 MMHG | OXYGEN SATURATION: 95 % | SYSTOLIC BLOOD PRESSURE: 121 MMHG | WEIGHT: 162.7 LBS | HEIGHT: 62 IN | TEMPERATURE: 97.9 F | HEART RATE: 56 BPM | RESPIRATION RATE: 18 BRPM

## 2024-11-07 PROBLEM — J44.9 COPD (CHRONIC OBSTRUCTIVE PULMONARY DISEASE) (MULTI): Status: ACTIVE | Noted: 2024-11-07

## 2024-11-07 PROBLEM — I48.91 ATRIAL FIBRILLATION (MULTI): Status: RESOLVED | Noted: 2024-07-16 | Resolved: 2024-11-07

## 2024-11-07 LAB
ATRIAL RATE: 56 BPM
GLUCOSE BLD MANUAL STRIP-MCNC: 162 MG/DL (ref 74–99)
GLUCOSE BLD MANUAL STRIP-MCNC: 205 MG/DL (ref 74–99)
P AXIS: 67 DEGREES
P OFFSET: 157 MS
P ONSET: 105 MS
PR INTERVAL: 206 MS
Q ONSET: 208 MS
QRS COUNT: 9 BEATS
QRS DURATION: 94 MS
QT INTERVAL: 504 MS
QTC CALCULATION(BAZETT): 486 MS
QTC FREDERICIA: 493 MS
R AXIS: -9 DEGREES
T AXIS: 14 DEGREES
T OFFSET: 460 MS
VENTRICULAR RATE: 56 BPM

## 2024-11-07 PROCEDURE — 2500000002 HC RX 250 W HCPCS SELF ADMINISTERED DRUGS (ALT 637 FOR MEDICARE OP, ALT 636 FOR OP/ED): Performed by: NURSE PRACTITIONER

## 2024-11-07 PROCEDURE — 7100000011 HC EXTENDED STAY RECOVERY HOURLY - NURSING UNIT

## 2024-11-07 PROCEDURE — 99238 HOSP IP/OBS DSCHRG MGMT 30/<: CPT | Performed by: NURSE PRACTITIONER

## 2024-11-07 PROCEDURE — 93005 ELECTROCARDIOGRAM TRACING: CPT

## 2024-11-07 PROCEDURE — 82947 ASSAY GLUCOSE BLOOD QUANT: CPT

## 2024-11-07 PROCEDURE — 93010 ELECTROCARDIOGRAM REPORT: CPT | Performed by: STUDENT IN AN ORGANIZED HEALTH CARE EDUCATION/TRAINING PROGRAM

## 2024-11-07 PROCEDURE — 2500000001 HC RX 250 WO HCPCS SELF ADMINISTERED DRUGS (ALT 637 FOR MEDICARE OP): Performed by: NURSE PRACTITIONER

## 2024-11-07 ASSESSMENT — PAIN - FUNCTIONAL ASSESSMENT
PAIN_FUNCTIONAL_ASSESSMENT: 0-10
PAIN_FUNCTIONAL_ASSESSMENT: 0-10

## 2024-11-07 ASSESSMENT — PAIN SCALES - GENERAL
PAINLEVEL_OUTOF10: 0 - NO PAIN
PAINLEVEL_OUTOF10: 3

## 2024-11-07 ASSESSMENT — PAIN DESCRIPTION - DESCRIPTORS: DESCRIPTORS: ACHING

## 2024-11-07 NOTE — CARE PLAN
The patient's goals for the shift include      The clinical goals for the shift include Remain in NSR

## 2024-11-07 NOTE — NURSING NOTE
1400 reviewed dc paperwork with pt and daughter. Answered all questions.   1430 ange with DME confirmed O2 prescription and provided next step instructions. Tank at bedside.   1445 pt left unit via wheelchair with o2 tank on 2L NC in stable condition with all belongings.

## 2024-11-07 NOTE — DISCHARGE SUMMARY
Discharge Diagnosis  Paroxysmal atrial fibrillation (Multi)    Issues Requiring Follow-Up  Patient to follow up as outpatient with Dr. Tucker - scheduled 12/3/24    Test Results Pending At Discharge  Pending Labs       No current pending labs.          Hospital Course   This is a 78 year old female with a PMH significant for DM, HLD, HTN, chronic systolic heart failure and atrial fibrillation.  The patient was diagnosed with atrial fibrillation in December 2022.  Recently she has had an increase in fatigue.  The patient presented to [facility] yesterday, 11/6/24 for RFA of atrial fibrillation with Dr. Tucker.    PMH:  DM, HLD, HTN, chronic systolic heart failure, atrial fibrillation, iron deficiency anemia, GERD, hypothyroidism, CKD  PSH:  colostomy, knee surgery, partial colectomy  Family history:  Mother - heart disease, HTN.  Father - CA, heart disease.  Social history:  Former smoker, caffeine use, denies alcohol and illicit drug use    11/7/24:  s/p RFA of atrial fibrillation with Dr. Tucker 11/6/24 (see procedure note for details).  Upon exam this AM patient is lying in bed in no apparent distress without complaints.  VSS.  Patient remains in sinus rhythm on telemetry.  Bilateral groin sites covered with DRSG D+I, no hematoma, no ecchymosis, no erythema.  Patient denies any chest pain, shortness of breath, back pain, abdominal pain, numbness/tingling, nausea/vomiting, fever/chills.  Patient will be discharged home today.    Atrial fibrillation s/p RFA  - discharge home today  - patient to follow up as outpatient - scheduled 12/3/24  - continue apixaban (Eliquis) 5 mg BID  - continue amiodarone 200 mg daily  - metoprolol succinate discontinued due to bradycardia  - colchicine 0.7 mg daily for 7 days  - continue home esomeprazole 40 mg  - continue rest of home medications    Above patient and plan discussed with Dr. Tucker.    Pertinent Physical Exam At Time of Discharge  Physical Exam  Constitutional:       General: She is  not in acute distress.  Cardiovascular:      Rate and Rhythm: Bradycardia present.      Comments: + pulses all extremities.  Pulmonary:      Effort: Pulmonary effort is normal. No respiratory distress.      Comments: Clear bilaterally.  Abdominal:      General: Bowel sounds are normal.   Skin:     General: Skin is warm and dry.      Comments: Bilateral groin sites covered with DRSG D+I, no hematoma, no ecchymosis, no erythema.   Neurological:      General: No focal deficit present.      Mental Status: She is alert and oriented to person, place, and time.   Psychiatric:         Mood and Affect: Mood normal.         Behavior: Behavior normal.       Home Medications     Medication List      START taking these medications     colchicine 0.6 mg tablet; Take 1 tablet (0.6 mg) by mouth once daily for   7 days.     CONTINUE taking these medications     albuterol 90 mcg/actuation inhaler; Inhale 1-2 puffs every 6 hours if   needed for wheezing.   amiodarone 200 mg tablet; Commonly known as: Pacerone; Take 1 tablet   (200 mg) by mouth once daily.   apixaban 5 mg tablet; Commonly known as: Eliquis; Take 1 tablet (5 mg)   by mouth 2 times a day.   atorvastatin 20 mg tablet; Commonly known as: Lipitor; Take 1 tablet (20   mg) by mouth once daily.   azelastine 137 mcg (0.1 %) nasal spray; Commonly known as: Astelin   esomeprazole 40 mg DR capsule; Commonly known as: NexIUM; Take 1 capsule   (40 mg) by mouth once daily in the morning. Take before meals.   FreeStyle Aneesh 2 [place] misc; Generic drug: flash glucose scanning   reader; Use as instructed   FreeStyle Aneesh 2 Sensor kit; Generic drug: flash glucose sensor kit;   Use as instructed 1 device every 14 days   glipiZIDE 5 mg tablet; Commonly known as: Glucotrol; Take 1 tablet (5   mg) by mouth 2 times a day before meals.   insulin lispro 100 unit/mL injection; Commonly known as: HumaLOG KwikPen   Insulin; Inject 3 times daily using sliding scale: 0-150 no units, 151-200   2  units, 201-250 4 units, 251-300 6 units, 301-350 8 units, 351-400 10   units, above 400 call Dr. Ben Bishop 30 units daily   levothyroxine 50 mcg tablet; Commonly known as: Synthroid, Levoxyl; Take   1 tablet (50 mcg) by mouth once daily.   lisinopril 40 mg tablet; TAKE 1 TABLET(40 MG) BY MOUTH EVERY DAY   metFORMIN  mg 24 hr tablet; Commonly known as: Glucophage-XR; Take   2 tablets (1,000 mg) by mouth 2 times daily (morning and late afternoon).   spironolactone 25 mg tablet; Commonly known as: Aldactone; Take 1 tablet   (25 mg) by mouth once daily.   venlafaxine  mg 24 hr capsule; Commonly known as: Effexor-XR; Take   1 capsule (150 mg) by mouth once daily at bedtime.     STOP taking these medications     metoprolol succinate  mg 24 hr tablet; Commonly known as:   Toprol-XL       Outpatient Follow-Up  Future Appointments   Date Time Provider Department Center   12/3/2024  2:00 PM Hank Tucker MD [id number] [place]   3/10/2025 10:30 AM Abhilash Vieira MD [id number] [place]       Evie Pickens, TAD-CNP

## 2024-11-07 NOTE — NURSING NOTE
1305 pt is 95% on room air at rest.  Pt is 76% on room air while ambulating.  Pt is 94% ambulating on 2L NC.

## 2024-11-08 LAB
ATRIAL RATE: 300 BPM
ATRIAL RATE: 54 BPM
P AXIS: 85 DEGREES
P OFFSET: 175 MS
P ONSET: 123 MS
PR INTERVAL: 200 MS
Q ONSET: 211 MS
Q ONSET: 223 MS
QRS COUNT: 15 BEATS
QRS COUNT: 9 BEATS
QRS DURATION: 90 MS
QRS DURATION: 94 MS
QT INTERVAL: 384 MS
QT INTERVAL: 510 MS
QTC CALCULATION(BAZETT): 474 MS
QTC CALCULATION(BAZETT): 483 MS
QTC FREDERICIA: 442 MS
QTC FREDERICIA: 492 MS
R AXIS: -21 DEGREES
R AXIS: 9 DEGREES
T AXIS: 39 DEGREES
T AXIS: 87 DEGREES
T OFFSET: 403 MS
T OFFSET: 478 MS
VENTRICULAR RATE: 54 BPM
VENTRICULAR RATE: 92 BPM

## 2024-11-11 ENCOUNTER — OFFICE VISIT (OUTPATIENT)
Dept: PRIMARY CARE | Facility: CLINIC | Age: 79
End: 2024-11-11
Payer: MEDICARE

## 2024-11-11 ENCOUNTER — APPOINTMENT (OUTPATIENT)
Dept: CARDIOLOGY | Facility: CLINIC | Age: 79
End: 2024-11-11
Payer: MEDICARE

## 2024-11-11 ENCOUNTER — TELEPHONE (OUTPATIENT)
Dept: CARDIOLOGY | Facility: CLINIC | Age: 79
End: 2024-11-11

## 2024-11-11 VITALS
OXYGEN SATURATION: 96 % | RESPIRATION RATE: 16 BRPM | HEART RATE: 87 BPM | TEMPERATURE: 97.7 F | WEIGHT: 162 LBS | DIASTOLIC BLOOD PRESSURE: 84 MMHG | SYSTOLIC BLOOD PRESSURE: 136 MMHG | BODY MASS INDEX: 29.63 KG/M2

## 2024-11-11 DIAGNOSIS — Z86.79 S/P ABLATION OPERATION FOR ARRHYTHMIA: ICD-10-CM

## 2024-11-11 DIAGNOSIS — Z98.890 S/P ABLATION OPERATION FOR ARRHYTHMIA: ICD-10-CM

## 2024-11-11 DIAGNOSIS — I48.0 PAROXYSMAL ATRIAL FIBRILLATION (MULTI): ICD-10-CM

## 2024-11-11 DIAGNOSIS — R09.02 HYPOXEMIA: ICD-10-CM

## 2024-11-11 DIAGNOSIS — I10 BENIGN ESSENTIAL HYPERTENSION: ICD-10-CM

## 2024-11-11 DIAGNOSIS — Z00.00 ENCOUNTER FOR HEALTH MAINTENANCE EXAMINATION: ICD-10-CM

## 2024-11-11 DIAGNOSIS — I49.8 SINUS ARRHYTHMIA: Primary | ICD-10-CM

## 2024-11-11 PROCEDURE — 99496 TRANSJ CARE MGMT HIGH F2F 7D: CPT | Performed by: FAMILY MEDICINE

## 2024-11-11 PROCEDURE — 93000 ELECTROCARDIOGRAM COMPLETE: CPT | Performed by: FAMILY MEDICINE

## 2024-11-11 RX ORDER — METOPROLOL SUCCINATE 25 MG/1
12.5 TABLET, EXTENDED RELEASE ORAL DAILY
Qty: 45 TABLET | Refills: 3 | Status: SHIPPED | OUTPATIENT
Start: 2024-11-11 | End: 2024-11-12 | Stop reason: DRUGHIGH

## 2024-11-11 NOTE — PROGRESS NOTES
Subjective   Patient ID: Marissa Rebolledo is a 78 y.o. female who presents for Follow-up (Ablation done 11/5. She was discharged Thursday evening. Everything went as planned- no complications. They did want her to take a walk before she left and her O2 was low so they made her wear oxygen for when she is walking. ) and Rapid Heart Rate (She is taking colchicine for 7 days which she read it could cause increased heart rate but she was also told it could be because of being off metoprolol. ).  HPI  Patient comes in today for follow-up from recent hospitalization for cardiac ablation of her A-fib.  She was hospitalized 11/6 to 11/7 at [facility] by Dr. Tucker.  She was discharged on continuous O2 at 2 L per nasal cannula that she has with her today but she is not wearing.  She states that her pulse ox has been continuously above 90 without the oxygen.  She states she got the oxygen because before she left the hospital when she was walking the hallways her oxygen was low.  Patient has also noticed that they had stopped her metoprolol following the procedure and since that happened her heart rate seems to be climbing upwards and it has been above 100 several times and she is concerned about the atrial fib returning.  An EKG today shows a mild sinus arrhythmia with heart rate of 82.  She states that she had called the cardiologist office today and expressed her concerns and they are putting her back on 12.5 mg of metoprolol succinate daily.  Patient reports having a lot of side effects with the colchicine she was given for 7 days after the ablation.  She apparently took it for the last 3 days but did not take 1 today so far.    Review of Systems   All other systems reviewed and are negative.      Objective   Vitals:  /84   Pulse 87   Temp 36.5 °C (97.7 °F)   Resp 16   Wt 73.5 kg (162 lb)   LMP  (LMP Unknown)   SpO2 96%   BMI 29.63 kg/m²     Physical Exam  Vitals reviewed.   Constitutional:       Appearance:  She is well-developed.   HENT:      Head: Normocephalic and atraumatic.      Right Ear: Tympanic membrane, ear canal and external ear normal.      Left Ear: Tympanic membrane, ear canal and external ear normal.      Nose: Nose normal.      Mouth/Throat:      Mouth: Mucous membranes are moist.      Pharynx: Oropharynx is clear.   Eyes:      Extraocular Movements: Extraocular movements intact.      Conjunctiva/sclera: Conjunctivae normal.      Pupils: Pupils are equal, round, and reactive to light.   Cardiovascular:      Rate and Rhythm: Normal rate. Rhythm irregular.      Heart sounds: Normal heart sounds. No murmur heard.  Pulmonary:      Effort: Pulmonary effort is normal.      Breath sounds: Normal breath sounds. No wheezing.   Abdominal:      General: Abdomen is flat. Bowel sounds are normal.      Palpations: Abdomen is soft.   Musculoskeletal:         General: Normal range of motion.   Skin:     General: Skin is warm and dry.   Neurological:      General: No focal deficit present.      Mental Status: She is alert and oriented to person, place, and time. Mental status is at baseline.   Psychiatric:         Mood and Affect: Mood normal.         Behavior: Behavior normal.         Thought Content: Thought content normal.         Judgment: Judgment normal.       Assessment/Plan   Problem List Items Addressed This Visit       Paroxysmal atrial fibrillation (Multi)    Relevant Medications    metoprolol succinate XL (Toprol-XL) 25 mg 24 hr tablet    S/P ablation operation for arrhythmia    Overview     Ablation for atrial fib 11/6/2024 [facility] Dr. Tucker         Relevant Medications    metoprolol succinate XL (Toprol-XL) 25 mg 24 hr tablet    Other Relevant Orders    ECG 12 Lead (Completed)    Sinus arrhythmia - Primary    Overview     Noted on EKG 11/11/2024         Relevant Medications    metoprolol succinate XL (Toprol-XL) 25 mg 24 hr tablet    Other Relevant Orders    ECG 12 Lead (Completed)    Hypoxemia     Overview     On continuous O2 per nasal cannula at 2 L 11/11/2024          Other Visit Diagnoses       Encounter for health maintenance examination        Benign essential hypertension            Continue all current meds.  RTC in one month for recheck, sooner should problems arise.  Continue O2 at home for now and continue to monitor appeared to be a pulse ox.  If it remains above 90 she is to contact me.  HOLLEY colchicine  Follow-up with cardiology Dr. Tucker as scheduled.  Medication list reconciled.  Thank you for visiting today!      Professional services: Some of this note was completed using Dragon voice technology and sometimes the software misinterprets words. This may include unintended errors with respect to translation of words, typographical errors or grammar errors which may not have been identified prior to finalization of the chart note. Please take this into account when reading the note. Thank you.       Chris Contreras DO 11/12/24 7:16 AM

## 2024-11-11 NOTE — TELEPHONE ENCOUNTER
"Pt called today, she had afib ablation last wed. Toprol was dc'd at the hospital due to bradycardia. She is taking amio 200 daily, eliquis 5 bid and colchicine was started for 7 days. She said that her HR is higher now, since ablation. At rest in 80s, goes up to  with exertion and feels jittery. She thinks the colchicine is causing the \"rapid HR\".     Message sent to Dr Tucker. Ok to stop colchicine, can bring back toprol but at smaller dose--12.5 once daily,.    Notified. Pt may continue colchicine, it was only going to taken for 7 days. Pended to Dr Tucker.       "

## 2024-11-12 PROBLEM — I49.8 SINUS ARRHYTHMIA: Status: ACTIVE | Noted: 2024-11-12

## 2024-11-12 PROBLEM — R09.02 HYPOXEMIA: Status: ACTIVE | Noted: 2024-11-12

## 2024-11-12 LAB
ATRIAL RATE: 300 BPM
ATRIAL RATE: 54 BPM
ATRIAL RATE: 56 BPM
P AXIS: 67 DEGREES
P AXIS: 85 DEGREES
P OFFSET: 157 MS
P OFFSET: 175 MS
P ONSET: 105 MS
P ONSET: 123 MS
PR INTERVAL: 200 MS
PR INTERVAL: 206 MS
Q ONSET: 208 MS
Q ONSET: 211 MS
Q ONSET: 223 MS
QRS COUNT: 15 BEATS
QRS COUNT: 9 BEATS
QRS COUNT: 9 BEATS
QRS DURATION: 90 MS
QRS DURATION: 94 MS
QRS DURATION: 94 MS
QT INTERVAL: 384 MS
QT INTERVAL: 504 MS
QT INTERVAL: 510 MS
QTC CALCULATION(BAZETT): 474 MS
QTC CALCULATION(BAZETT): 483 MS
QTC CALCULATION(BAZETT): 486 MS
QTC FREDERICIA: 442 MS
QTC FREDERICIA: 492 MS
QTC FREDERICIA: 493 MS
R AXIS: -21 DEGREES
R AXIS: -9 DEGREES
R AXIS: 9 DEGREES
T AXIS: 14 DEGREES
T AXIS: 39 DEGREES
T AXIS: 87 DEGREES
T OFFSET: 403 MS
T OFFSET: 460 MS
T OFFSET: 478 MS
VENTRICULAR RATE: 54 BPM
VENTRICULAR RATE: 56 BPM
VENTRICULAR RATE: 92 BPM

## 2024-11-12 RX ORDER — METOPROLOL SUCCINATE 25 MG/1
12.5 TABLET, EXTENDED RELEASE ORAL DAILY
Qty: 45 TABLET | Refills: 3 | Status: SHIPPED | OUTPATIENT
Start: 2024-11-12

## 2024-11-15 LAB
ATRIAL RATE: 54 BPM
P AXIS: 65 DEGREES
P OFFSET: 152 MS
P ONSET: 105 MS
PR INTERVAL: 206 MS
Q ONSET: 208 MS
QRS COUNT: 9 BEATS
QRS DURATION: 92 MS
QT INTERVAL: 482 MS
QTC CALCULATION(BAZETT): 457 MS
QTC FREDERICIA: 465 MS
R AXIS: -3 DEGREES
T AXIS: 35 DEGREES
T OFFSET: 449 MS
VENTRICULAR RATE: 54 BPM

## 2024-12-03 ENCOUNTER — APPOINTMENT (OUTPATIENT)
Dept: CARDIOLOGY | Facility: CLINIC | Age: 79
End: 2024-12-03
Payer: MEDICARE

## 2024-12-09 ENCOUNTER — OFFICE VISIT (OUTPATIENT)
Dept: CARDIOLOGY | Facility: CLINIC | Age: 79
End: 2024-12-09
Payer: MEDICARE

## 2024-12-09 ENCOUNTER — ANCILLARY PROCEDURE (OUTPATIENT)
Dept: CARDIOLOGY | Facility: CLINIC | Age: 79
End: 2024-12-09
Payer: MEDICARE

## 2024-12-09 VITALS
HEART RATE: 62 BPM | SYSTOLIC BLOOD PRESSURE: 152 MMHG | BODY MASS INDEX: 29.81 KG/M2 | WEIGHT: 163 LBS | RESPIRATION RATE: 16 BRPM | DIASTOLIC BLOOD PRESSURE: 80 MMHG | OXYGEN SATURATION: 98 %

## 2024-12-09 DIAGNOSIS — Z98.890 S/P ABLATION OPERATION FOR ARRHYTHMIA: ICD-10-CM

## 2024-12-09 DIAGNOSIS — Z86.79 S/P ABLATION OPERATION FOR ARRHYTHMIA: ICD-10-CM

## 2024-12-09 DIAGNOSIS — I10 PRIMARY HYPERTENSION: ICD-10-CM

## 2024-12-09 DIAGNOSIS — I48.91 ATRIAL FIBRILLATION, UNSPECIFIED TYPE (MULTI): ICD-10-CM

## 2024-12-09 DIAGNOSIS — I48.0 PAROXYSMAL ATRIAL FIBRILLATION (MULTI): Primary | ICD-10-CM

## 2024-12-09 DIAGNOSIS — I49.8 SINUS ARRHYTHMIA: ICD-10-CM

## 2024-12-09 PROCEDURE — G2211 COMPLEX E/M VISIT ADD ON: HCPCS | Performed by: STUDENT IN AN ORGANIZED HEALTH CARE EDUCATION/TRAINING PROGRAM

## 2024-12-09 PROCEDURE — 3079F DIAST BP 80-89 MM HG: CPT | Performed by: STUDENT IN AN ORGANIZED HEALTH CARE EDUCATION/TRAINING PROGRAM

## 2024-12-09 PROCEDURE — 93005 ELECTROCARDIOGRAM TRACING: CPT

## 2024-12-09 PROCEDURE — 93010 ELECTROCARDIOGRAM REPORT: CPT | Performed by: STUDENT IN AN ORGANIZED HEALTH CARE EDUCATION/TRAINING PROGRAM

## 2024-12-09 PROCEDURE — 1123F ACP DISCUSS/DSCN MKR DOCD: CPT | Performed by: STUDENT IN AN ORGANIZED HEALTH CARE EDUCATION/TRAINING PROGRAM

## 2024-12-09 PROCEDURE — 99214 OFFICE O/P EST MOD 30 MIN: CPT | Performed by: STUDENT IN AN ORGANIZED HEALTH CARE EDUCATION/TRAINING PROGRAM

## 2024-12-09 PROCEDURE — 1159F MED LIST DOCD IN RCRD: CPT | Performed by: STUDENT IN AN ORGANIZED HEALTH CARE EDUCATION/TRAINING PROGRAM

## 2024-12-09 PROCEDURE — 3077F SYST BP >= 140 MM HG: CPT | Performed by: STUDENT IN AN ORGANIZED HEALTH CARE EDUCATION/TRAINING PROGRAM

## 2024-12-09 RX ORDER — METOPROLOL SUCCINATE 25 MG/1
12.5 TABLET, EXTENDED RELEASE ORAL 2 TIMES DAILY
Qty: 45 TABLET | Refills: 3 | Status: SHIPPED | OUTPATIENT
Start: 2024-12-09 | End: 2024-12-11 | Stop reason: DRUGHIGH

## 2024-12-09 RX ORDER — AMIODARONE HYDROCHLORIDE 200 MG/1
100 TABLET ORAL DAILY
Qty: 15 TABLET | Refills: 0 | Status: SHIPPED | OUTPATIENT
Start: 2024-12-09 | End: 2025-01-08

## 2024-12-09 NOTE — PROGRESS NOTES
"    Cardiac Electrophysiology Office Visit     Referred by Dr. Winters ref. provider found for   Chief Complaint   Patient presents with    Follow-up    Atrial Fibrillation      HPI:  Marissa Rebolledo is a 78 y.o. year old female patient with h/o COVID (Dec 2021 - Hospitalization), New DX of DM, CHF (HFrEF - 40%), atrial fibrillation  presenting today for follow up    PMHx/PSHx: As above  Tobacco quit 40 years - 1ppd for 10 years , alcohol - denies, caffeine use 1-2 cups/coffee day , drug use - denies  FamHx: Father \" Heart disease\"; Mother - D@92, no heart disease, Sister - Recent Dx of CHF, Brother - PPM; Son - D@50 2/2 Throat CA.    Objective  Allergies   Allergen Reactions    Metformin Headache and GI Upset     Abdominal pain    Mirtazapine Hives    Rosuvastatin Myalgia     Muscle spasm/weakness    Simvastatin Myalgia     Muscle spasms/weakness  Leg cramps     Sulfamethoxazole-Trimethoprim Hives    Colchicine Other     Did not feel well with it    Jardiance [Empagliflozin] Other    Alendronic Acid Other    Clarithromycin Other     weak    Fosamax [Alendronate] Other     weak    Nitrofurantoin Monohyd/M-Cryst Other     weak    Ofloxacin Other     weakness        Current Outpatient Medications   Medication Instructions    albuterol 90 mcg/actuation inhaler 1-2 puffs, inhalation, Every 6 hours PRN    amiodarone (PACERONE) 200 mg, oral, Daily    apixaban (ELIQUIS) 5 mg, oral, 2 times daily    atorvastatin (LIPITOR) 20 mg, oral, Daily    azelastine (Astelin) 137 mcg (0.1 %) nasal spray 1 spray, 2 times daily    esomeprazole (NEXIUM) 40 mg, oral, Daily before breakfast    flash glucose sensor kit (FreeStyle Aneesh 2 Sensor) kit Use as instructed 1 device every 14 days    FreeStyle Aneesh reader (FreeStyle Aneesh 2 [place]) misc Use as instructed    glipiZIDE (GLUCOTROL) 5 mg, oral, 2 times daily before meals    insulin lispro (HumaLOG KwikPen Insulin) 100 unit/mL injection Inject 3 times daily using sliding scale: 0-150 no " units, 151-200 2 units, 201-250 4 units, 251-300 6 units, 301-350 8 units, 351-400 10 units, above 400 call Dr. Ben Bishop 30 units daily    levothyroxine (SYNTHROID, LEVOXYL) 50 mcg, oral, Daily    lisinopril 40 mg, oral, Daily    metFORMIN XR (GLUCOPHAGE-XR) 1,000 mg, oral, 2 times daily (morning and late afternoon)    metoprolol succinate XL (TOPROL-XL) 12.5 mg, oral, Daily, Do not crush or chew.    spironolactone (ALDACTONE) 25 mg, oral, Daily    venlafaxine XR (EFFEXOR-XR) 150 mg, oral, Nightly         Visit Vitals  /80   Pulse 62   Resp 16   Wt 73.9 kg (163 lb)   LMP  (LMP Unknown)   SpO2 98%   BMI 29.81 kg/m²   OB Status Postmenopausal   Smoking Status Former   BSA 1.8 m²        Physical Exam  Vitals reviewed.   Constitutional:       Appearance: Normal appearance.   HENT:      Head: Normocephalic and atraumatic.   Eyes:      Pupils: Pupils are equal, round, and reactive to light.   Cardiovascular:      Rate and Rhythm: Normal rate and regular rhythm.      Pulses: Normal pulses.      Heart sounds: Normal heart sounds. No murmur heard.  Pulmonary:      Effort: Pulmonary effort is normal. No respiratory distress.      Breath sounds: Normal breath sounds. No wheezing.   Abdominal:      Tenderness: There is no abdominal tenderness.   Musculoskeletal:      Comments: Bilateral groin exams without any signs of ecchymosis, hematoma or bruit.   Skin:     General: Skin is warm and dry.   Neurological:      General: No focal deficit present.      Mental Status: She is alert.           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  Since then patient has had increased fatigue and still have persistent atrial fibrillation.  Since patient is currently already on amiodarone at this point  (prior cardioversion attempts were canceled due to patient being in sinus rhythm) ablative strategy is a  Strategy.  I discussed in detail again with the patient the concern about long-term atrial fibrillation with her cardiomyopathy which is likely tach induced in the past strategy to maintain LV function or try improve it would be to get patient back into regular rhythm.  c/w AC: Apixaban 5mg BID  Decrease Amiodarone to 100mg daily - Then stop on 1/15/25  Increase Toprol XL to 12.5mg BID (Pt had some BB withdrawal with increased anxiety givne reduction from prior dose to 100mg daily)    #HTN  c/w Lisinopril 40mg Daily  c/w Spironolactone 25mg Daily  If BP still elevated may need to consider addition of HCTZ for BP control    Return to Clinic: Patient should return to the EP Clinic in 3 months    Hank Tucker MD [facility]  Cardiac Electrophysiology  [email or web address]    **Disclaimer: This note was dictated by speech recognition, and every effort has been made to prevent any error in transcription, however minor errors may be present**

## 2024-12-11 ENCOUNTER — APPOINTMENT (OUTPATIENT)
Dept: PRIMARY CARE | Facility: CLINIC | Age: 79
End: 2024-12-11
Payer: MEDICARE

## 2024-12-11 VITALS
BODY MASS INDEX: 30 KG/M2 | RESPIRATION RATE: 16 BRPM | TEMPERATURE: 97.3 F | HEART RATE: 56 BPM | SYSTOLIC BLOOD PRESSURE: 136 MMHG | WEIGHT: 164 LBS | DIASTOLIC BLOOD PRESSURE: 86 MMHG | OXYGEN SATURATION: 97 %

## 2024-12-11 DIAGNOSIS — Z86.79 S/P ABLATION OPERATION FOR ARRHYTHMIA: ICD-10-CM

## 2024-12-11 DIAGNOSIS — E11.9 TYPE 2 DIABETES MELLITUS WITHOUT COMPLICATION, WITH LONG-TERM CURRENT USE OF INSULIN (MULTI): Primary | ICD-10-CM

## 2024-12-11 DIAGNOSIS — Z79.4 TYPE 2 DIABETES MELLITUS WITHOUT COMPLICATION, WITH LONG-TERM CURRENT USE OF INSULIN (MULTI): Primary | ICD-10-CM

## 2024-12-11 DIAGNOSIS — S09.90XS HEADACHES DUE TO OLD HEAD INJURY: ICD-10-CM

## 2024-12-11 DIAGNOSIS — G44.309 HEADACHES DUE TO OLD HEAD INJURY: ICD-10-CM

## 2024-12-11 DIAGNOSIS — R51.9 NONINTRACTABLE HEADACHE, UNSPECIFIED CHRONICITY PATTERN, UNSPECIFIED HEADACHE TYPE: ICD-10-CM

## 2024-12-11 DIAGNOSIS — I48.0 PAROXYSMAL ATRIAL FIBRILLATION (MULTI): ICD-10-CM

## 2024-12-11 DIAGNOSIS — E03.9 HYPOTHYROIDISM (ACQUIRED): ICD-10-CM

## 2024-12-11 DIAGNOSIS — I10 HYPERTENSION, UNSPECIFIED TYPE: ICD-10-CM

## 2024-12-11 DIAGNOSIS — E78.2 HYPERLIPIDEMIA, MIXED: ICD-10-CM

## 2024-12-11 DIAGNOSIS — I10 HYPERTENSION, ESSENTIAL, BENIGN: ICD-10-CM

## 2024-12-11 DIAGNOSIS — Z98.890 S/P ABLATION OPERATION FOR ARRHYTHMIA: ICD-10-CM

## 2024-12-11 DIAGNOSIS — E55.9 VITAMIN D DEFICIENCY: ICD-10-CM

## 2024-12-11 DIAGNOSIS — E53.8 VITAMIN B12 DEFICIENCY: ICD-10-CM

## 2024-12-11 PROCEDURE — 99214 OFFICE O/P EST MOD 30 MIN: CPT | Performed by: FAMILY MEDICINE

## 2024-12-11 RX ORDER — METOPROLOL SUCCINATE 25 MG/1
12.5 TABLET, EXTENDED RELEASE ORAL 2 TIMES DAILY
Qty: 45 TABLET | Refills: 3 | Status: SHIPPED | OUTPATIENT
Start: 2024-12-11

## 2024-12-11 RX ORDER — BUTALBITAL, ACETAMINOPHEN AND CAFFEINE 50; 325; 40 MG/1; MG/1; MG/1
1-2 TABLET ORAL EVERY 4 HOURS PRN
Qty: 30 TABLET | Refills: 0 | Status: SHIPPED | OUTPATIENT
Start: 2024-12-11 | End: 2025-01-10

## 2024-12-11 RX ORDER — SPIRONOLACTONE 50 MG/1
50 TABLET, FILM COATED ORAL DAILY
Qty: 90 TABLET | Refills: 1 | Status: SHIPPED | OUTPATIENT
Start: 2024-12-11 | End: 2025-06-09

## 2024-12-11 RX ORDER — METOPROLOL SUCCINATE 25 MG/1
25 TABLET, EXTENDED RELEASE ORAL 2 TIMES DAILY
Qty: 45 TABLET | Refills: 3 | Status: SHIPPED | OUTPATIENT
Start: 2024-12-11 | End: 2024-12-11

## 2024-12-11 ASSESSMENT — ENCOUNTER SYMPTOMS
DEPRESSION: 0
OCCASIONAL FEELINGS OF UNSTEADINESS: 0
LOSS OF SENSATION IN FEET: 0

## 2024-12-11 ASSESSMENT — PATIENT HEALTH QUESTIONNAIRE - PHQ9
1. LITTLE INTEREST OR PLEASURE IN DOING THINGS: NOT AT ALL
1. LITTLE INTEREST OR PLEASURE IN DOING THINGS: NOT AT ALL
2. FEELING DOWN, DEPRESSED OR HOPELESS: NOT AT ALL
SUM OF ALL RESPONSES TO PHQ9 QUESTIONS 1 AND 2: 0
SUM OF ALL RESPONSES TO PHQ9 QUESTIONS 1 AND 2: 0
2. FEELING DOWN, DEPRESSED OR HOPELESS: NOT AT ALL

## 2024-12-11 NOTE — Clinical Note
Closure device placed in the left femoral vein. Site closed by Tegaderm. [Time Spent: ___ minutes] : I have spent [unfilled] minutes of time on the encounter which excludes teaching and separately reported services.

## 2024-12-11 NOTE — PROGRESS NOTES
Subjective   Patient ID: Marissa Rebolledo is a 79 y.o. female who presents for Follow-up (6 mo med fu. No questions, concerns, or complaints. //And fu from last OV. Cardiologist changed amiodarone to just half a pill. Metoprolol is 11 Units pill in am and 11 Units pill in pm. /Discuss cgm for coverage at pharmacy- needs recent office note. ).    HPI  Patient presents today for 6-month checkup and refill of meds. She currently is without complaints. She states that she is taking her medicines as directed and is not having any side effects from them.  The continuous glucose monitor is working well for patient.  She continues to take her insulin and diabetic medications as directed.  She is due for lab work which is ordered today.    She had cardioversion of her atrial fibrillation and states during her most recent visit with cardiology that she is still in sinus rhythm.    She states she is under a lot of stress financially with upcoming property taxes and house insurance due and trying to stretch her income to cover those.    Her blood pressure she claims is good at home but it is high here today.  Her heart rate is less than 60.  Will increase her spironolactone to 50 mg daily and recheck again in 1 month.    Review of Systems   All other systems reviewed and are negative.      Objective   Vitals:  /86   Pulse 56   Temp 36.3 °C (97.3 °F)   Resp 16   Wt 74.4 kg (164 lb)   LMP  (LMP Unknown)   SpO2 97%   BMI 30.00 kg/m²     Physical Exam  Vitals reviewed.   Constitutional:       Appearance: She is well-developed.   HENT:      Head: Normocephalic and atraumatic.      Right Ear: Tympanic membrane, ear canal and external ear normal.      Left Ear: Tympanic membrane, ear canal and external ear normal.      Nose: Nose normal.      Mouth/Throat:      Mouth: Mucous membranes are moist.      Pharynx: Oropharynx is clear.   Eyes:      Extraocular Movements: Extraocular movements intact.      Conjunctiva/sclera:  Conjunctivae normal.      Pupils: Pupils are equal, round, and reactive to light.   Cardiovascular:      Rate and Rhythm: Regular rhythm. Bradycardia present.      Heart sounds: Normal heart sounds. No murmur heard.  Pulmonary:      Effort: Pulmonary effort is normal.      Breath sounds: Normal breath sounds. No wheezing.   Abdominal:      General: Abdomen is flat. Bowel sounds are normal.      Palpations: Abdomen is soft.   Musculoskeletal:         General: Normal range of motion.   Skin:     General: Skin is warm and dry.   Neurological:      General: No focal deficit present.      Mental Status: She is alert and oriented to person, place, and time. Mental status is at baseline.   Psychiatric:         Mood and Affect: Mood normal.         Behavior: Behavior normal.         Thought Content: Thought content normal.         Judgment: Judgment normal.       Assessment/Plan   Problem List Items Addressed This Visit       Diabetes mellitus, type 2 (Multi) - Primary    Overview     We will be checking hemoglobin A1c today         Relevant Orders    Albumin-Creatinine Ratio, Urine Random    Hemoglobin A1C    Hypertension    Relevant Medications    spironolactone (Aldactone) 50 mg tablet    Vitamin D deficiency    Relevant Orders    Vitamin D 25-Hydroxy,Total (for eval of Vitamin D levels)    Paroxysmal atrial fibrillation (Multi)    Relevant Medications    metoprolol succinate XL (Toprol-XL) 25 mg 24 hr tablet    S/P ablation operation for arrhythmia    Overview     Ablation for atrial fib 11/6/2024 [facility] Dr. Tucker         Relevant Medications    metoprolol succinate XL (Toprol-XL) 25 mg 24 hr tablet     Other Visit Diagnoses       Vitamin B12 deficiency        Relevant Orders    CBC    Vitamin B12    Hypothyroidism (acquired)        Relevant Orders    TSH with reflex to Free T4 if abnormal    Hyperlipidemia, mixed        Relevant Orders    Lipid Panel    Hypertension, essential, benign        Relevant  Orders    Comprehensive Metabolic Panel    Headaches due to old head injury        Nonintractable headache, unspecified chronicity pattern, unspecified headache type        Relevant Medications    butalbital-acetaminophen-caff -40 mg tablet        Will have her try Fioricet for headaches.  Increase spironolactone to 50 mg daily.  Continue metoprolol succinate 12.5 mg twice daily per cardiology  Continue CGM.  Will contact patient with lab results when available.  Take new dose of medication as directed.  Continue other medications as directed.  RTC in one month for recheck, sooner should problems arise.  Medication list reconciled.  Thank you for visiting today!      Professional services: Some of this note was completed using Dragon voice technology and sometimes the software misinterprets words. This may include unintended errors with respect to translation of words, typographical errors or grammar errors which may not have been identified prior to finalization of the chart note. Please take this into account when reading the note. Thank you.       Chris Contreras,  12/22/24 12:19 PM

## 2024-12-14 LAB
ATRIAL RATE: 62 BPM
P AXIS: 73 DEGREES
P OFFSET: 176 MS
P ONSET: 131 MS
PR INTERVAL: 188 MS
Q ONSET: 225 MS
QRS COUNT: 10 BEATS
QRS DURATION: 92 MS
QT INTERVAL: 474 MS
QTC CALCULATION(BAZETT): 481 MS
QTC FREDERICIA: 479 MS
R AXIS: -15 DEGREES
T AXIS: 55 DEGREES
T OFFSET: 462 MS
VENTRICULAR RATE: 62 BPM

## 2024-12-27 ENCOUNTER — TELEPHONE (OUTPATIENT)
Dept: PRIMARY CARE | Facility: CLINIC | Age: 79
End: 2024-12-27
Payer: MEDICARE

## 2024-12-27 DIAGNOSIS — E11.9 TYPE 2 DIABETES MELLITUS WITHOUT COMPLICATION, WITHOUT LONG-TERM CURRENT USE OF INSULIN (MULTI): ICD-10-CM

## 2024-12-27 DIAGNOSIS — E78.5 HYPERLIPIDEMIA, UNSPECIFIED HYPERLIPIDEMIA TYPE: ICD-10-CM

## 2024-12-27 RX ORDER — ATORVASTATIN CALCIUM 20 MG/1
20 TABLET, FILM COATED ORAL DAILY
Qty: 90 TABLET | Refills: 1 | Status: SHIPPED | OUTPATIENT
Start: 2024-12-27 | End: 2025-06-25

## 2024-12-27 RX ORDER — GLIPIZIDE 5 MG/1
5 TABLET ORAL
Qty: 180 TABLET | Refills: 1 | Status: SHIPPED | OUTPATIENT
Start: 2024-12-27 | End: 2025-06-25

## 2024-12-27 NOTE — TELEPHONE ENCOUNTER
Patient is calling questioning the status of her TextDigger 2 system.  Patient states that the pharmacy is requesting a peer to peer.  Patient can be reached at [phone].          Thank You

## 2024-12-27 NOTE — TELEPHONE ENCOUNTER
Rx Refill Request Telephone Encounter    Name:  Marissa Rebolledo  :  [id number]  Medication Name:    atorvastatin (Lipitor) 20 mg tablet   apixaban (Eliquis) 5 mg tablet   glipiZIDE (Glucotrol) 5 mg tablet     Specific Pharmacy location:    [id number] - [place], [place] - [address] AT [address] & [address] Phone: [phone]   Fax: [phone]        Date of last appointment:  24  Date of next appointment:  25  Best number to reach patient:  [phone]      Patient states that she is out of these medications.  Patient can be reached at the number above.  Thank You

## 2024-12-27 NOTE — TELEPHONE ENCOUNTER
Eliquis filled 8/22 for 90 day and 1 refill. Thank you.     Requested Prescriptions     Pending Prescriptions Disp Refills    atorvastatin (Lipitor) 20 mg tablet 90 tablet 1     Sig: Take 1 tablet (20 mg) by mouth once daily.    glipiZIDE (Glucotrol) 5 mg tablet 180 tablet 1     Sig: Take 1 tablet (5 mg) by mouth 2 times a day before meals.

## 2024-12-31 ENCOUNTER — TELEPHONE (OUTPATIENT)
Dept: PRIMARY CARE | Facility: CLINIC | Age: 79
End: 2024-12-31
Payer: MEDICARE

## 2024-12-31 NOTE — TELEPHONE ENCOUNTER
Spoke with pharmacy because pt called stating she needed a peer to peer review for her Aneesh 2 sensor..  Pharmacy was called to check on prior auth that was initiated 12/27.   They stated that they needed a new order either for Aneesh 2 Plus or for the Aneesh 3 because the 2 is getting phased out and insurance will no longer cover.

## 2025-01-02 DIAGNOSIS — Z79.4 TYPE 2 DIABETES MELLITUS WITHOUT COMPLICATION, WITH LONG-TERM CURRENT USE OF INSULIN (MULTI): Primary | ICD-10-CM

## 2025-01-02 DIAGNOSIS — E11.9 TYPE 2 DIABETES MELLITUS WITHOUT COMPLICATION, WITH LONG-TERM CURRENT USE OF INSULIN (MULTI): Primary | ICD-10-CM

## 2025-01-02 RX ORDER — BLOOD-GLUCOSE SENSOR
EACH MISCELLANEOUS
Qty: 9 EACH | Refills: 1 | Status: SHIPPED | OUTPATIENT
Start: 2025-01-02

## 2025-01-02 RX ORDER — BLOOD-GLUCOSE,RECEIVER,CONT
EACH MISCELLANEOUS
Qty: 1 EACH | Refills: 0 | Status: SHIPPED | OUTPATIENT
Start: 2025-01-02

## 2025-01-06 ENCOUNTER — LAB (OUTPATIENT)
Dept: LAB | Facility: LAB | Age: 80
End: 2025-01-06
Payer: MEDICARE

## 2025-01-06 DIAGNOSIS — E53.8 VITAMIN B12 DEFICIENCY: ICD-10-CM

## 2025-01-06 DIAGNOSIS — E78.2 HYPERLIPIDEMIA, MIXED: ICD-10-CM

## 2025-01-06 DIAGNOSIS — E11.9 TYPE 2 DIABETES MELLITUS WITHOUT COMPLICATION, WITH LONG-TERM CURRENT USE OF INSULIN (MULTI): ICD-10-CM

## 2025-01-06 DIAGNOSIS — Z79.4 TYPE 2 DIABETES MELLITUS WITHOUT COMPLICATION, WITH LONG-TERM CURRENT USE OF INSULIN (MULTI): ICD-10-CM

## 2025-01-06 DIAGNOSIS — E55.9 VITAMIN D DEFICIENCY: ICD-10-CM

## 2025-01-06 DIAGNOSIS — E03.9 HYPOTHYROIDISM (ACQUIRED): ICD-10-CM

## 2025-01-06 DIAGNOSIS — I48.91 ATRIAL FIBRILLATION, UNSPECIFIED TYPE (MULTI): ICD-10-CM

## 2025-01-06 DIAGNOSIS — I10 HYPERTENSION, ESSENTIAL, BENIGN: ICD-10-CM

## 2025-01-06 LAB
25(OH)D3 SERPL-MCNC: 27 NG/ML (ref 30–100)
ALBUMIN SERPL BCP-MCNC: 4 G/DL (ref 3.4–5)
ALP SERPL-CCNC: 80 U/L (ref 33–136)
ALT SERPL W P-5'-P-CCNC: 28 U/L (ref 7–45)
ANION GAP SERPL CALC-SCNC: 13 MMOL/L (ref 10–20)
AST SERPL W P-5'-P-CCNC: 25 U/L (ref 9–39)
BILIRUB SERPL-MCNC: 0.4 MG/DL (ref 0–1.2)
BUN SERPL-MCNC: 14 MG/DL (ref 6–23)
CALCIUM SERPL-MCNC: 9 MG/DL (ref 8.6–10.3)
CHLORIDE SERPL-SCNC: 103 MMOL/L (ref 98–107)
CHOLEST SERPL-MCNC: 144 MG/DL (ref 0–199)
CHOLESTEROL/HDL RATIO: 3.2
CO2 SERPL-SCNC: 30 MMOL/L (ref 21–32)
CREAT SERPL-MCNC: 1.02 MG/DL (ref 0.5–1.05)
CREAT UR-MCNC: 148.5 MG/DL (ref 20–320)
EGFRCR SERPLBLD CKD-EPI 2021: 56 ML/MIN/1.73M*2
ERYTHROCYTE [DISTWIDTH] IN BLOOD BY AUTOMATED COUNT: 14.6 % (ref 11.5–14.5)
GLUCOSE SERPL-MCNC: 165 MG/DL (ref 74–99)
HCT VFR BLD AUTO: 40.3 % (ref 36–46)
HDLC SERPL-MCNC: 44.6 MG/DL
HGB BLD-MCNC: 12.9 G/DL (ref 12–16)
LDLC SERPL CALC-MCNC: 73 MG/DL
MCH RBC QN AUTO: 27.4 PG (ref 26–34)
MCHC RBC AUTO-ENTMCNC: 32 G/DL (ref 32–36)
MCV RBC AUTO: 86 FL (ref 80–100)
MICROALBUMIN UR-MCNC: 26.1 MG/L
MICROALBUMIN/CREAT UR: 17.6 UG/MG CREAT
NON HDL CHOLESTEROL: 99 MG/DL (ref 0–149)
NRBC BLD-RTO: 0 /100 WBCS (ref 0–0)
PLATELET # BLD AUTO: 198 X10*3/UL (ref 150–450)
POTASSIUM SERPL-SCNC: 4.8 MMOL/L (ref 3.5–5.3)
PROT SERPL-MCNC: 6.6 G/DL (ref 6.4–8.2)
RBC # BLD AUTO: 4.7 X10*6/UL (ref 4–5.2)
SODIUM SERPL-SCNC: 141 MMOL/L (ref 136–145)
TRIGL SERPL-MCNC: 132 MG/DL (ref 0–149)
TSH SERPL-ACNC: 2.48 MIU/L (ref 0.44–3.98)
VIT B12 SERPL-MCNC: 379 PG/ML (ref 211–911)
VLDL: 26 MG/DL (ref 0–40)
WBC # BLD AUTO: 7.1 X10*3/UL (ref 4.4–11.3)

## 2025-01-06 PROCEDURE — 82570 ASSAY OF URINE CREATININE: CPT

## 2025-01-06 PROCEDURE — 82306 VITAMIN D 25 HYDROXY: CPT

## 2025-01-06 PROCEDURE — 82043 UR ALBUMIN QUANTITATIVE: CPT

## 2025-01-06 PROCEDURE — 83036 HEMOGLOBIN GLYCOSYLATED A1C: CPT

## 2025-01-06 PROCEDURE — 85027 COMPLETE CBC AUTOMATED: CPT

## 2025-01-06 PROCEDURE — 80053 COMPREHEN METABOLIC PANEL: CPT

## 2025-01-06 PROCEDURE — 84443 ASSAY THYROID STIM HORMONE: CPT

## 2025-01-06 PROCEDURE — 82607 VITAMIN B-12: CPT

## 2025-01-06 PROCEDURE — 80061 LIPID PANEL: CPT

## 2025-01-07 LAB
EST. AVERAGE GLUCOSE BLD GHB EST-MCNC: 180 MG/DL
HBA1C MFR BLD: 7.9 %

## 2025-01-09 NOTE — RESULT ENCOUNTER NOTE
Champ Ann,    Your sugar is still on the high side at 7.9.  We would like to see it less than 7.  I would like you to increase your glipizide 5 mg to 2 pills twice a day.  Will recheck it again in May 2025.    Your vitamin D level is low at 27 it should be between 30 and 100 the closer to 50 the better. It is an important vitamin for your heart, lungs, immune system and bones among other things in your body. Would recommend over the counter vitamin D3 2000 units daily to get it into and keep it in the normal range and recheck in May 2025.     Your B12 level was slightly low at 379.  Vitamin B-12 level should be above 400. If less than 400 can have both neurologic and/or psychological symptoms.  Would recommend over-the-counter vitamin B-12  1000 units daily to keep the level above 400. Recheck in May 2025.     The rest of your labs are good.  We will recheck them again in a year.    Have a Great Day and Happy New Year!!!    Dr. Contreras from the

## 2025-01-13 ENCOUNTER — APPOINTMENT (OUTPATIENT)
Dept: PRIMARY CARE | Facility: CLINIC | Age: 80
End: 2025-01-13
Payer: MEDICARE

## 2025-01-13 VITALS
BODY MASS INDEX: 29.45 KG/M2 | HEART RATE: 71 BPM | SYSTOLIC BLOOD PRESSURE: 110 MMHG | OXYGEN SATURATION: 96 % | TEMPERATURE: 97.2 F | RESPIRATION RATE: 20 BRPM | WEIGHT: 161 LBS | DIASTOLIC BLOOD PRESSURE: 70 MMHG

## 2025-01-13 DIAGNOSIS — Z86.79 S/P ABLATION OPERATION FOR ARRHYTHMIA: ICD-10-CM

## 2025-01-13 DIAGNOSIS — I48.0 PAROXYSMAL ATRIAL FIBRILLATION (MULTI): ICD-10-CM

## 2025-01-13 DIAGNOSIS — K21.9 GASTROESOPHAGEAL REFLUX DISEASE, UNSPECIFIED WHETHER ESOPHAGITIS PRESENT: ICD-10-CM

## 2025-01-13 DIAGNOSIS — E11.9 TYPE 2 DIABETES MELLITUS WITHOUT COMPLICATION, WITH LONG-TERM CURRENT USE OF INSULIN (MULTI): Primary | ICD-10-CM

## 2025-01-13 DIAGNOSIS — Z79.4 TYPE 2 DIABETES MELLITUS WITHOUT COMPLICATION, WITH LONG-TERM CURRENT USE OF INSULIN (MULTI): Primary | ICD-10-CM

## 2025-01-13 DIAGNOSIS — Z79.4 LONG TERM (CURRENT) USE OF INSULIN (MULTI): ICD-10-CM

## 2025-01-13 DIAGNOSIS — Z98.890 S/P ABLATION OPERATION FOR ARRHYTHMIA: ICD-10-CM

## 2025-01-13 DIAGNOSIS — E03.9 HYPOTHYROIDISM, UNSPECIFIED TYPE: ICD-10-CM

## 2025-01-13 PROBLEM — I11.0 HYPERTENSIVE HEART DISEASE WITH HEART FAILURE: Status: RESOLVED | Noted: 2022-11-08 | Resolved: 2025-01-13

## 2025-01-13 PROBLEM — I50.22 CHRONIC SYSTOLIC (CONGESTIVE) HEART FAILURE: Status: RESOLVED | Noted: 2022-11-08 | Resolved: 2025-01-13

## 2025-01-13 PROCEDURE — 99214 OFFICE O/P EST MOD 30 MIN: CPT | Performed by: FAMILY MEDICINE

## 2025-01-13 RX ORDER — LEVOTHYROXINE SODIUM 50 UG/1
50 TABLET ORAL DAILY
Qty: 90 TABLET | Refills: 1 | Status: SHIPPED | OUTPATIENT
Start: 2025-01-13 | End: 2025-07-12

## 2025-01-13 RX ORDER — ESOMEPRAZOLE MAGNESIUM 40 MG/1
40 CAPSULE, DELAYED RELEASE ORAL
Qty: 90 CAPSULE | Refills: 1 | Status: SHIPPED | OUTPATIENT
Start: 2025-01-13 | End: 2025-07-12

## 2025-01-13 RX ORDER — BLOOD-GLUCOSE,RECEIVER,CONT
EACH MISCELLANEOUS
Qty: 1 EACH | Refills: 0 | Status: SHIPPED | OUTPATIENT
Start: 2025-01-13

## 2025-01-13 RX ORDER — BLOOD-GLUCOSE SENSOR
1 EACH MISCELLANEOUS 2 TIMES DAILY
Qty: 4 EACH | Refills: 3 | Status: SHIPPED | OUTPATIENT
Start: 2025-01-13

## 2025-01-13 ASSESSMENT — ENCOUNTER SYMPTOMS
LOSS OF SENSATION IN FEET: 0
OCCASIONAL FEELINGS OF UNSTEADINESS: 0
DEPRESSION: 0

## 2025-01-13 NOTE — PROGRESS NOTES
Subjective   Patient ID: Marissa Rebolledo is a 79 y.o. female who presents for Follow-up (Med fu. She received notice from the pharmacy she would be getting a Decom but she had this before and it was difficult to put on. She has not picked it up yet. She also still gets SOB. ).    HPI  Patient comes in today for follow-up on her recent lab work.  She is also having issues with the CGM.  She is not sure whether she should be on the freestyle nickie or the Dexcom 7.    Patient also has been having issues with her nose being stuffy in the eyes grade he had a headache.  She had this couple days ago and then yesterday as well but today she is feeling okay.    Review of Systems   All other systems reviewed and are negative.      Objective   Vitals:  /70   Pulse 71   Temp 36.2 °C (97.2 °F)   Resp 20   Wt 73 kg (161 lb)   LMP  (LMP Unknown)   SpO2 96%   BMI 29.45 kg/m²     Physical Exam  Vitals reviewed.   Constitutional:       Appearance: She is well-developed.   HENT:      Head: Normocephalic and atraumatic.      Right Ear: Tympanic membrane, ear canal and external ear normal.      Left Ear: Tympanic membrane, ear canal and external ear normal.      Nose: Nose normal.      Mouth/Throat:      Mouth: Mucous membranes are moist.      Pharynx: Oropharynx is clear.   Eyes:      Extraocular Movements: Extraocular movements intact.      Conjunctiva/sclera: Conjunctivae normal.      Pupils: Pupils are equal, round, and reactive to light.   Cardiovascular:      Rate and Rhythm: Normal rate and regular rhythm.      Heart sounds: Normal heart sounds. No murmur heard.  Pulmonary:      Effort: Pulmonary effort is normal.      Breath sounds: Normal breath sounds. No wheezing.   Abdominal:      General: Abdomen is flat. Bowel sounds are normal.      Palpations: Abdomen is soft.   Musculoskeletal:         General: Normal range of motion.   Skin:     General: Skin is warm and dry.   Neurological:      General: No focal deficit  present.      Mental Status: She is alert and oriented to person, place, and time. Mental status is at baseline.   Psychiatric:         Mood and Affect: Mood normal.         Behavior: Behavior normal.         Thought Content: Thought content normal.         Judgment: Judgment normal.         Assessment/Plan   Problem List Items Addressed This Visit       Diabetes mellitus, type 2 (Multi) - Primary    Overview     Continue use of freestyle aneesh 3+         GERD (gastroesophageal reflux disease)    Relevant Medications    esomeprazole (NexIUM) 40 mg DR capsule    Other Relevant Orders    Follow Up In [facility] - PCP - Established    Hypothyroidism    Relevant Medications    levothyroxine (Synthroid, Levoxyl) 50 mcg tablet    Other Relevant Orders    Follow Up In [facility] - PCP - Established    S/P ablation operation for arrhythmia    Overview     Ablation for atrial fib 11/6/2024 [facility] Dr. Tucker          Other Visit Diagnoses       Long term (current) use of insulin (Multi)        Relevant Medications    blood-glucose meter,continuous (FreeStyle Aneesh 3 [place]) misc    blood-glucose sensor (FreeStyle Aneesh 3 Plus Sensor) device    Other Relevant Orders    Follow Up In [facility] - PCP - Established    Paroxysmal atrial fibrillation (Multi)            Return for Medicare wellness exam in June  Return to clinic sooner if blood sugars not under good control  Medication list reconciled.  Thank you for visiting today!      Professional services: Some of this note was completed using Dragon voice technology and sometimes the software misinterprets words. This may include unintended errors with respect to translation of words, typographical errors or grammar errors which may not have been identified prior to finalization of the chart note. Please take this into account when reading the note. Thank you.       Chris Contreras DO 01/16/25 7:08 AM        Chris Contreras,  01/23/25 7:00 AM

## 2025-01-13 NOTE — PATIENT INSTRUCTIONS
Your sugar is still on the high side at 7.9.  We would like to see it less than 7.  I would like you to increase your glipizide 5 mg to 2 pills twice a day.  Will recheck it again in May 2025.     Your vitamin D level is low at 27 it should be between 30 and 100 the closer to 50 the better. It is an important vitamin for your heart, lungs, immune system and bones among other things in your body. Would recommend over the counter vitamin D3 2000 units daily to get it into and keep it in the normal range and recheck in May 2025.     Your B12 level was slightly low at 379.  Vitamin B-12 level should be above 400. If less than 400 can have both neurologic and/or psychological symptoms.  Would recommend over-the-counter vitamin B-12  1000 units daily to keep the level above 400. Recheck in May 2025.     The rest of your labs are good.  We will recheck them again in a year.     Have a Great Day and Happy New Year!!!     Dr. Contreras

## 2025-01-18 DIAGNOSIS — I42.9 CARDIOMYOPATHY, UNSPECIFIED TYPE (MULTI): ICD-10-CM

## 2025-01-18 DIAGNOSIS — I10 HYPERTENSION, UNSPECIFIED TYPE: ICD-10-CM

## 2025-01-20 RX ORDER — LISINOPRIL 40 MG/1
40 TABLET ORAL DAILY
Qty: 90 TABLET | Refills: 2 | Status: SHIPPED | OUTPATIENT
Start: 2025-01-20

## 2025-01-24 ENCOUNTER — TELEPHONE (OUTPATIENT)
Dept: PRIMARY CARE | Facility: CLINIC | Age: 80
End: 2025-01-24
Payer: MEDICARE

## 2025-01-24 NOTE — TELEPHONE ENCOUNTER
Patient is calling in to check the status on her Aneesh 3 freestyle prior authorization.  Patient can be reached at [phone].        Thank You

## 2025-01-27 DIAGNOSIS — I48.91 ATRIAL FIBRILLATION, UNSPECIFIED TYPE (MULTI): ICD-10-CM

## 2025-01-28 NOTE — TELEPHONE ENCOUNTER
Patient is calling in stating that she still does not have her system and stating that the pharmacy still does not have what they need.  Patient can be reached at [phone].        Thank You

## 2025-02-04 ENCOUNTER — TELEPHONE (OUTPATIENT)
Dept: PRIMARY CARE | Facility: CLINIC | Age: 80
End: 2025-02-04
Payer: MEDICARE

## 2025-02-04 DIAGNOSIS — Z79.4 LONG TERM (CURRENT) USE OF INSULIN (MULTI): ICD-10-CM

## 2025-02-04 RX ORDER — BLOOD-GLUCOSE,RECEIVER,CONT
EACH MISCELLANEOUS
Qty: 1 EACH | Refills: 0 | Status: CANCELLED | OUTPATIENT
Start: 2025-02-04

## 2025-02-04 RX ORDER — BLOOD-GLUCOSE,RECEIVER,CONT
EACH MISCELLANEOUS
Qty: 1 EACH | Refills: 0 | Status: SHIPPED | OUTPATIENT
Start: 2025-02-04

## 2025-02-04 RX ORDER — BLOOD-GLUCOSE,RECEIVER,CONT
EACH MISCELLANEOUS
Qty: 1 EACH | Refills: 0 | Status: SHIPPED | OUTPATIENT
Start: 2025-02-04 | End: 2025-02-04 | Stop reason: SDUPTHER

## 2025-02-04 NOTE — TELEPHONE ENCOUNTER
Pharmacy states they did not receive Rx for aneesh 3 reader. Please resend. Thank you.     Requested Prescriptions     Pending Prescriptions Disp Refills    blood-glucose meter,continuous (FreeStyle Aneesh 3 [place]) misc 1 each 0     Sig: Use as instructed

## 2025-02-05 DIAGNOSIS — Z79.4 LONG TERM (CURRENT) USE OF INSULIN (MULTI): ICD-10-CM

## 2025-02-05 RX ORDER — BLOOD-GLUCOSE SENSOR
1 EACH MISCELLANEOUS 2 TIMES DAILY
Qty: 6 EACH | Refills: 1 | Status: SHIPPED | OUTPATIENT
Start: 2025-02-05

## 2025-02-05 RX ORDER — BLOOD-GLUCOSE SENSOR
EACH MISCELLANEOUS
Qty: 4 EACH | Refills: 3 | OUTPATIENT
Start: 2025-02-05

## 2025-02-26 DIAGNOSIS — F41.9 ANXIETY: ICD-10-CM

## 2025-02-26 NOTE — TELEPHONE ENCOUNTER
Pt last OV 1/13/2025    Requested Prescriptions     Pending Prescriptions Disp Refills    venlafaxine XR (Effexor-XR) 150 mg 24 hr capsule 90 capsule 1     Sig: Take 1 capsule (150 mg) by mouth once daily at bedtime.

## 2025-02-27 RX ORDER — VENLAFAXINE HYDROCHLORIDE 150 MG/1
150 CAPSULE, EXTENDED RELEASE ORAL NIGHTLY
Qty: 90 CAPSULE | Refills: 1 | Status: SHIPPED | OUTPATIENT
Start: 2025-02-27 | End: 2025-08-26

## 2025-03-10 ENCOUNTER — APPOINTMENT (OUTPATIENT)
Dept: CARDIOLOGY | Facility: CLINIC | Age: 80
End: 2025-03-10
Payer: MEDICARE

## 2025-03-10 VITALS
HEART RATE: 78 BPM | OXYGEN SATURATION: 94 % | DIASTOLIC BLOOD PRESSURE: 76 MMHG | BODY MASS INDEX: 29.45 KG/M2 | SYSTOLIC BLOOD PRESSURE: 110 MMHG | WEIGHT: 161 LBS

## 2025-03-10 DIAGNOSIS — E78.5 HYPERLIPIDEMIA, UNSPECIFIED HYPERLIPIDEMIA TYPE: ICD-10-CM

## 2025-03-10 DIAGNOSIS — Z86.79 S/P ABLATION OPERATION FOR ARRHYTHMIA: Primary | ICD-10-CM

## 2025-03-10 DIAGNOSIS — Z98.890 S/P ABLATION OPERATION FOR ARRHYTHMIA: Primary | ICD-10-CM

## 2025-03-10 DIAGNOSIS — I10 PRIMARY HYPERTENSION: ICD-10-CM

## 2025-03-10 DIAGNOSIS — I42.9 CARDIOMYOPATHY, UNSPECIFIED TYPE (MULTI): ICD-10-CM

## 2025-03-10 PROCEDURE — 99213 OFFICE O/P EST LOW 20 MIN: CPT | Performed by: STUDENT IN AN ORGANIZED HEALTH CARE EDUCATION/TRAINING PROGRAM

## 2025-03-10 PROCEDURE — 1160F RVW MEDS BY RX/DR IN RCRD: CPT | Performed by: STUDENT IN AN ORGANIZED HEALTH CARE EDUCATION/TRAINING PROGRAM

## 2025-03-10 PROCEDURE — 1159F MED LIST DOCD IN RCRD: CPT | Performed by: STUDENT IN AN ORGANIZED HEALTH CARE EDUCATION/TRAINING PROGRAM

## 2025-03-10 PROCEDURE — 3078F DIAST BP <80 MM HG: CPT | Performed by: STUDENT IN AN ORGANIZED HEALTH CARE EDUCATION/TRAINING PROGRAM

## 2025-03-10 PROCEDURE — 1123F ACP DISCUSS/DSCN MKR DOCD: CPT | Performed by: STUDENT IN AN ORGANIZED HEALTH CARE EDUCATION/TRAINING PROGRAM

## 2025-03-10 PROCEDURE — 3074F SYST BP LT 130 MM HG: CPT | Performed by: STUDENT IN AN ORGANIZED HEALTH CARE EDUCATION/TRAINING PROGRAM

## 2025-03-10 ASSESSMENT — ENCOUNTER SYMPTOMS
SYNCOPE: 0
ALLERGIC/IMMUNOLOGIC NEGATIVE: 1
MUSCULOSKELETAL NEGATIVE: 1
NEUROLOGICAL NEGATIVE: 1
ENDOCRINE NEGATIVE: 1
EYES NEGATIVE: 1
PALPITATIONS: 0
HEMATOLOGIC/LYMPHATIC NEGATIVE: 1
RESPIRATORY NEGATIVE: 1
NEAR-SYNCOPE: 0
GASTROINTESTINAL NEGATIVE: 1
PSYCHIATRIC NEGATIVE: 1
PND: 0
ORTHOPNEA: 0

## 2025-03-10 NOTE — PATIENT INSTRUCTIONS
Thank you for your visit today. Please contact our office (via MyChart or phone) with any additional questions.     [facility] [place]    Pam, RN/Clinic Nurse for:    Dr. Dariel Morejon    [address], [address]  [place] [address]    Phone: [phone] Press Option 5 then Option 3 to speak with the Clinic Nurse (Pam)    _____    To Reach:    Billing Questions -    [phone]  Scheduling / Rescheduling -  Option 1  Refills / Medication Requests -  Option 3  General Office / [place] -  Option 4  Results -     Option 6  Medical Records -    Option 7  Repeat Options -    Option 9

## 2025-03-10 NOTE — PROGRESS NOTES
[facility] Cardiology Outpatient Follow-up Visit     Reason for Visit: follow-up chronic systolic heart failure; paroxysmal afib.     HPI: Marissa Rebolledo is a 79 y.o.  female who presents today for follow-up chronic systolic heart failure; paroxysmal afib. Past medical history of paroxysmal afib ([id number] 6- On apixaban), cardiomyopathy with mild to moderate LV systolic dysfunction (LVEF 40% in 2022), Type II DM, Hx SARS2-COVID (12/2021- required hospitalization).      Patient presented to cardiology clinic on 11/30/2022. No active cardiac complaints at this time. No chest pains. Patient is not always able to tell when she is in afib, but does typically note generalized fatigue during such episodes. She is established with EP and considering possiblity of afib ablation (PVI). Currently tolerating apixaban without significant bleeding issues. No orthopena, PND, syncope.      Recently seen by EP October 2023 > sinus bradycardia > metoprolol was decreased. Patient returned to cardiology clinic on 11/30/2023.  No active cardiac complaints. Interval improvement in HR and generalized fatigue with down-titration of metoprolol.  Euvolemic on exam.     Marissa presented to cardiology clinic on 2/2/2024.  Over the past couple weeks patient has noted generalized fatigue and dyspnea.  Heart rate has been irregular.  EKG in office today showed atrial fibrillation with heart rate 110 bpm.  Patient notes good adherence with her cardiac medications.  Tolerating apixaban without significant bleeding issues.  Given symptomatic atrial fibrillation discussed the option of elective electrical cardioversion.  Patient agreeable to proceed.  We will schedule [facility].  We also have patient follow-up with electrophysiology for reconsideration of PVI/A-fib ablation.    Patient was scheduled to undergo elective cardioversion on 2/15/2024. On day of procedure patient was found to have spontaneously  converted to sinus rhythm > DCCV cancelled. Patient returned to cardiology clinic on 3/5/2024.  Currently asymptomatic from a cardiac perspective.  Notes interval improvement in generalized fatigue and dyspnea after conversion to sinus rhythm.  Appears euvolemic on exam. Tolerating apixaban without significant bleeding issues.     Patient return to cardiology clinic on 24.  Since her last visit patient had recurrence of atrial fibrillation that has been accompanied by generalized fatigue and exertional dyspnea.  Given symptomatic atrial fibrillation recommend referral to electrophysiology for consideration of ablation; patient agreeable.  Patient deemed to be a good candidate for ablation and underwent successful radiofrequency ablation for atrial fibrillation/pulmonary vein isolation; successful radiofrequency ablation for typical atrial flutter with CTI ablation on 2024.    Marissa presented to cardiology clinic on 3/10/2025.  Patient is currently asymptomatic from a cardiovascular standpoint.  Interval improvement in dyspnea and fatigue after prior ablation.  Patient remains in sinus rhythm.  Patient appears euvolemic on exam.  Etiology of systolic dysfunction thought to be tachycardia mediated; we will repeat a limited TTE to reassess LVEF in 3 months.    Past Medical History:   - as above    Surgical History:   She has a past surgical history that includes Colostomy (2013); Knee surgery (2013); Colectomy (12/10/2013); and Cardiac electrophysiology procedure (N/A, 2024).    Family History:   Family History   Problem Relation Name Age of Onset    Heart disease Mother      Hypertension Mother      Cancer Father      Heart disease Father       - CV disease (father); mother- no heart disease (lived to 92), CHF (sister), brother- PPM; son ( age 50 2/2 throat cancer)     Allergies:  Metformin, Mirtazapine, Rosuvastatin, Simvastatin, Sulfamethoxazole-trimethoprim, Colchicine, Jardiance  [empagliflozin], Alendronic acid, Clarithromycin, Fosamax [alendronate], Nitrofurantoin monohyd/m-cryst, and Ofloxacin     Social History:   - former smoker (Quit 40 years ago; 10 pack year hx); no alcohol use; no illicit drug use.     Prior Cardiovascular Testing (Personally Reviewed):     EP ablation (11/6/2024)  Acutely successful radiofrequency ablation for Atrial fibrillation - Pulmonary vein isolation  Acutely successful radiofrequency ablation for Typical atrial flutter - CTI ablation  Vascular closure: RFV: Perclose x2; LFV: Vascade MVP x1    TTE (11/2022)- Mild to moderate LV systolic function decrease (LVEF 40%), mild to moderate MR, mildly elevated RVSP, global LV hypokinesis     Review of Systems:  Review of Systems   HENT: Negative.     Eyes: Negative.    Cardiovascular:  Negative for chest pain, near-syncope, orthopnea, palpitations, paroxysmal nocturnal dyspnea and syncope.   Respiratory: Negative.  Negative for shortness of breath.    Endocrine: Negative.    Hematologic/Lymphatic: Negative.    Skin: Negative.    Musculoskeletal: Negative.    Gastrointestinal: Negative.    Genitourinary: Negative.    Neurological: Negative.    Psychiatric/Behavioral: Negative.     Allergic/Immunologic: Negative.        Outpatient Medications:    Current Outpatient Medications:     albuterol 90 mcg/actuation inhaler, Inhale 1-2 puffs every 6 hours if needed for wheezing., Disp: 18 g, Rfl: 0    apixaban (Eliquis) 5 mg tablet, Take 1 tablet (5 mg) by mouth 2 times a day., Disp: 180 tablet, Rfl: 1    atorvastatin (Lipitor) 20 mg tablet, Take 1 tablet (20 mg) by mouth once daily., Disp: 90 tablet, Rfl: 1    azelastine (Astelin) 137 mcg (0.1 %) nasal spray, 1 spray by Does not apply route twice a day., Disp: , Rfl:     blood-glucose meter,continuous (Dexcom G7 ) misc, Use as instructed, Disp: 1 each, Rfl: 0    blood-glucose meter,continuous (FreeStyle Aneesh 3 [place]) misc, Use as instructed, Disp: 1 each, Rfl: 0     blood-glucose sensor (Dexcom G7 Sensor) device, Replace every 10 days, Disp: 9 each, Rfl: 1    blood-glucose sensor (FreeStyle Aneesh 3 Plus Sensor) device, 1 each 2 times a day. Replace every 15 days, Disp: 6 each, Rfl: 1    esomeprazole (NexIUM) 40 mg DR capsule, Take 1 capsule (40 mg) by mouth once daily in the morning. Take before meals., Disp: 90 capsule, Rfl: 1    glipiZIDE (Glucotrol) 5 mg tablet, Take 1 tablet (5 mg) by mouth 2 times a day before meals., Disp: 180 tablet, Rfl: 1    insulin lispro (HumaLOG KwikPen Insulin) 100 unit/mL injection, Inject 3 times daily using sliding scale: 0-150 no units, 151-200 2 units, 201-250 4 units, 251-300 6 units, 301-350 8 units, 351-400 10 units, above 400 call Dr. Ben Bishop 30 units daily, Disp: 15 mL, Rfl: 3    levothyroxine (Synthroid, Levoxyl) 50 mcg tablet, Take 1 tablet (50 mcg) by mouth once daily., Disp: 90 tablet, Rfl: 1    lisinopril 40 mg tablet, TAKE 1 TABLET(40 MG) BY MOUTH EVERY DAY, Disp: 90 tablet, Rfl: 2    metoprolol succinate XL (Toprol-XL) 25 mg 24 hr tablet, Take 0.5 tablets (12.5 mg) by mouth 2 times a day. Do not crush or chew., Disp: 45 tablet, Rfl: 3    spironolactone (Aldactone) 50 mg tablet, Take 1 tablet (50 mg) by mouth once daily., Disp: 90 tablet, Rfl: 1    venlafaxine XR (Effexor-XR) 150 mg 24 hr capsule, Take 1 capsule (150 mg) by mouth once daily at bedtime., Disp: 90 capsule, Rfl: 1    amiodarone (Pacerone) 200 mg tablet, Take 0.5 tablets (100 mg) by mouth once daily., Disp: 15 tablet, Rfl: 0    metFORMIN  mg 24 hr tablet, Take 2 tablets (1,000 mg) by mouth 2 times daily (morning and late afternoon)., Disp: 120 tablet, Rfl: 5     Last Recorded Vitals  /76   Pulse 78   Wt 73 kg (161 lb)   LMP  (LMP Unknown)   SpO2 94%   BMI 29.45 kg/m²     Physical Exam:    Physical Exam  Constitutional:       General: She is not in acute distress.  HENT:      Head: Normocephalic.      Mouth/Throat:      Mouth: Mucous membranes are  moist.   Eyes:      Extraocular Movements: Extraocular movements intact.      Conjunctiva/sclera: Conjunctivae normal.   Neck:      Vascular: No JVD.   Cardiovascular:      Rate and Rhythm: Normal rate. Rhythm irregularly irregular.      Pulses: Normal pulses.      Heart sounds: No murmur heard.  Pulmonary:      Effort: Pulmonary effort is normal. No respiratory distress.      Breath sounds: Normal breath sounds.   Abdominal:      General: There is no distension.   Musculoskeletal:         General: No swelling.   Skin:     General: Skin is warm and dry.   Neurological:      General: No focal deficit present.      Mental Status: She is alert.      Cranial Nerves: No cranial nerve deficit.      Motor: No weakness.   Psychiatric:         Mood and Affect: Mood normal.         Behavior: Behavior normal.         Lab/Radiology/Diagnostic Review:    Labs    Lab Results   Component Value Date    GLUCOSE 165 (H) 01/06/2025    CALCIUM 9.0 01/06/2025     01/06/2025    K 4.8 01/06/2025    CO2 30 01/06/2025     01/06/2025    BUN 14 01/06/2025    CREATININE 1.02 01/06/2025       Lab Results   Component Value Date    WBC 7.1 01/06/2025    HGB 12.9 01/06/2025    HCT 40.3 01/06/2025    MCV 86 01/06/2025     01/06/2025       Lab Results   Component Value Date    CHOL 144 01/06/2025    CHOL 122 06/24/2024    CHOL 148 01/31/2024     Lab Results   Component Value Date    HDL 44.6 01/06/2025    HDL 32.6 06/24/2024    HDL 39.2 01/31/2024     Lab Results   Component Value Date    LDLCALC 73 01/06/2025    LDLCALC 47 06/24/2024    LDLCALC 64 01/31/2024     Lab Results   Component Value Date    TRIG 132 01/06/2025    TRIG 213 (H) 06/24/2024    TRIG 224 (H) 01/31/2024       Lab Results   Component Value Date     (H) 06/27/2024     (H) 11/03/2022       Lab Results   Component Value Date    TSH 2.48 01/06/2025       Assessment:   79 y.o.  female who presents today for follow-up chronic systolic heart  failure; paroxysmal afib. Past medical history of paroxysmal afib ([id number] 6- On apixaban), cardiomyopathy with mild to moderate LV systolic dysfunction (LVEF 40%), Type II DM, Hx SARS2-COVID (12/2021- required hospitalization).     Marissa presented to cardiology clinic on 3/10/2025.  Patient is currently asymptomatic from a cardiovascular standpoint.  Interval improvement in dyspnea and fatigue after prior ablation.  Patient remains in sinus rhythm.  Patient appears euvolemic on exam.  Etiology of systolic dysfunction thought to be tachycardia mediated; we will repeat a limited TTE to reassess LVEF in 3 months.    Overall Plan:  1. atrial fibrillation ([id number] 6)  - s/p successful afib RFA PVI, aflutter ablation 11/2024  - continue apixaban, per EP continue beta blockade     2. 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 DLD (goal LDL < 100 mg/dl)  - continue atorvastatin     4. Type II DM- continue metformin; if Empagliflozin not cost-prohibitive can re-start in near future    Disposition- Return to cardiology clinic in 3-6 months    Thank you for your visit today. Please contact our office with any questions.     Abhilash Vieira MD

## 2025-03-13 ASSESSMENT — ENCOUNTER SYMPTOMS: SHORTNESS OF BREATH: 0

## 2025-03-18 ENCOUNTER — APPOINTMENT (OUTPATIENT)
Dept: CARDIOLOGY | Facility: CLINIC | Age: 80
End: 2025-03-18
Payer: MEDICARE

## 2025-03-18 VITALS
RESPIRATION RATE: 16 BRPM | SYSTOLIC BLOOD PRESSURE: 142 MMHG | OXYGEN SATURATION: 94 % | BODY MASS INDEX: 29.81 KG/M2 | DIASTOLIC BLOOD PRESSURE: 80 MMHG | WEIGHT: 163 LBS | HEART RATE: 67 BPM

## 2025-03-18 DIAGNOSIS — I48.91 ATRIAL FIBRILLATION, UNSPECIFIED TYPE (MULTI): ICD-10-CM

## 2025-03-18 PROCEDURE — 1123F ACP DISCUSS/DSCN MKR DOCD: CPT | Performed by: STUDENT IN AN ORGANIZED HEALTH CARE EDUCATION/TRAINING PROGRAM

## 2025-03-18 PROCEDURE — 3079F DIAST BP 80-89 MM HG: CPT | Performed by: STUDENT IN AN ORGANIZED HEALTH CARE EDUCATION/TRAINING PROGRAM

## 2025-03-18 PROCEDURE — 93010 ELECTROCARDIOGRAM REPORT: CPT | Performed by: STUDENT IN AN ORGANIZED HEALTH CARE EDUCATION/TRAINING PROGRAM

## 2025-03-18 PROCEDURE — 99214 OFFICE O/P EST MOD 30 MIN: CPT | Performed by: STUDENT IN AN ORGANIZED HEALTH CARE EDUCATION/TRAINING PROGRAM

## 2025-03-18 PROCEDURE — 3077F SYST BP >= 140 MM HG: CPT | Performed by: STUDENT IN AN ORGANIZED HEALTH CARE EDUCATION/TRAINING PROGRAM

## 2025-03-18 PROCEDURE — 93005 ELECTROCARDIOGRAM TRACING: CPT | Performed by: STUDENT IN AN ORGANIZED HEALTH CARE EDUCATION/TRAINING PROGRAM

## 2025-03-18 PROCEDURE — 1159F MED LIST DOCD IN RCRD: CPT | Performed by: STUDENT IN AN ORGANIZED HEALTH CARE EDUCATION/TRAINING PROGRAM

## 2025-03-18 PROCEDURE — G2211 COMPLEX E/M VISIT ADD ON: HCPCS | Performed by: STUDENT IN AN ORGANIZED HEALTH CARE EDUCATION/TRAINING PROGRAM

## 2025-03-18 PROCEDURE — 99214 OFFICE O/P EST MOD 30 MIN: CPT | Mod: 25 | Performed by: STUDENT IN AN ORGANIZED HEALTH CARE EDUCATION/TRAINING PROGRAM

## 2025-03-18 NOTE — PROGRESS NOTES
"    Cardiac Electrophysiology Office Visit     Referred by Hank Duong MD for   Chief Complaint   Patient presents with    Follow-up           Atrial Fibrillation      HPI:  Marissa Rebolledo is a 79 y.o. year old female patient with h/o COVID (Dec 2021 - Hospitalization), New DX of DM, CHF (HFrEF - 40%), atrial fibrillation  presenting today for follow up    PMHx/PSHx: As above  Tobacco quit 40 years - 1ppd for 10 years , alcohol - denies, caffeine use 1-2 cups/coffee day , drug use - denies  FamHx: Father \" Heart disease\"; Mother - D@92, no heart disease, Sister - Recent Dx of CHF, Brother - PPM; Son - D@50 2/2 Throat CA.    Objective  Allergies   Allergen Reactions    Metformin Headache and GI Upset     Abdominal pain    Mirtazapine Hives    Rosuvastatin Myalgia     Muscle spasm/weakness    Simvastatin Myalgia     Muscle spasms/weakness  Leg cramps     Sulfamethoxazole-Trimethoprim Hives    Colchicine Other     Did not feel well with it    Jardiance [Empagliflozin] Other    Alendronic Acid Other    Clarithromycin Other     weak    Fosamax [Alendronate] Other     weak    Nitrofurantoin Monohyd/M-Cryst Other     weak    Ofloxacin Other     weakness        Current Outpatient Medications   Medication Instructions    albuterol 90 mcg/actuation inhaler 1-2 puffs, inhalation, Every 6 hours PRN    amiodarone (PACERONE) 100 mg, oral, Daily    apixaban (ELIQUIS) 5 mg, oral, 2 times daily    atorvastatin (LIPITOR) 20 mg, oral, Daily    azelastine (Astelin) 137 mcg (0.1 %) nasal spray 1 spray, 2 times daily    blood-glucose meter,continuous (Dexcom G7 ) misc Use as instructed    blood-glucose meter,continuous (FreeStyle Aneesh 3 [place]) misc Use as instructed    blood-glucose sensor (Dexcom G7 Sensor) device Replace every 10 days    blood-glucose sensor (FreeStyle Aneesh 3 Plus Sensor) device 1 each, miscellaneous, 2 times daily, Replace every 15 days    esomeprazole (NEXIUM) 40 mg, oral, Daily before breakfast "    glipiZIDE (GLUCOTROL) 5 mg, oral, 2 times daily before meals    insulin lispro (HumaLOG KwikPen Insulin) 100 unit/mL injection Inject 3 times daily using sliding scale: 0-150 no units, 151-200 2 units, 201-250 4 units, 251-300 6 units, 301-350 8 units, 351-400 10 units, above 400 call Dr. Ben Bishop 30 units daily    levothyroxine (SYNTHROID, LEVOXYL) 50 mcg, oral, Daily    lisinopril 40 mg, oral, Daily    metFORMIN XR (GLUCOPHAGE-XR) 1,000 mg, oral, 2 times daily (morning and late afternoon)    metoprolol succinate XL (TOPROL-XL) 12.5 mg, oral, 2 times daily, Do not crush or chew.    spironolactone (ALDACTONE) 50 mg, oral, Daily    venlafaxine XR (EFFEXOR-XR) 150 mg, oral, Nightly         Visit Vitals  /80   Pulse 67   Resp 16   Wt 73.9 kg (163 lb)   LMP  (LMP Unknown)   SpO2 94%   BMI 29.81 kg/m²   OB Status Postmenopausal   Smoking Status Former   BSA 1.8 m²        Physical Exam  Vitals reviewed.   Constitutional:       Appearance: Normal appearance.   HENT:      Head: Normocephalic and atraumatic.   Eyes:      Pupils: Pupils are equal, round, and reactive to light.   Cardiovascular:      Rate and Rhythm: Normal rate and regular rhythm.      Pulses: Normal pulses.      Heart sounds: Normal heart sounds. No murmur heard.  Pulmonary:      Effort: Pulmonary effort is normal. No respiratory distress.      Breath sounds: Examination of the right-lower field reveals rhonchi. Examination of the left-lower field reveals rhonchi. Rhonchi present. No wheezing.   Abdominal:      Tenderness: There is no abdominal tenderness.   Musculoskeletal:      Comments: Bilateral groin exams without any signs of ecchymosis, hematoma or bruit.   Skin:     General: Skin is warm and dry.   Neurological:      General: No focal deficit present.      Mental Status: She is alert.         My Interpretation of Reviewed Study(s):  Echo (Oct 2022): LVEF 40%, global hypokinesis, likely Tachycardia induced CM  Echo (September 2024):  Moderately decreased LV function with EF of 35 to 40% mild in left atrium and right atrium no pericardial effusion    Assessment/Plan   #Paroxysmal atrial fibrillation s/p RFA (PVI Nov 2024)  #Atrial Flutter s/p RFA (CTI Nov 2024)  AF Dx History: Dec 2022; h/o Cardioversion: No; AAD Use: Amiodarone 200mg (stopped due to post ablation); Anticoagulation use: Apixaban 5mg BID (current); h/o Ablation: Yes; [id number] Score: 6  Post ablation pt feeling well, recently had URI/Cold  c/w AC: Apixaban 5mg BID  Toprol XL to 12.5mg BID (Pt had some BB withdrawal with increased anxiety given reduction from prior dose to 100mg daily)    #HTN  c/w Lisinopril 40mg Daily  c/w Spironolactone 25mg Daily  If BP still elevated may need to consider addition of HCTZ for BP control    Return to Clinic: Patient should return to the EP Clinic in 3 months    Hank Tucker MD [facility]  Cardiac Electrophysiology  [email or web address]    **Disclaimer: This note was dictated by speech recognition, and every effort has been made to prevent any error in transcription, however minor errors may be present**

## 2025-03-19 LAB
ATRIAL RATE: 67 BPM
P AXIS: 72 DEGREES
P OFFSET: 191 MS
P ONSET: 129 MS
PR INTERVAL: 194 MS
Q ONSET: 226 MS
QRS COUNT: 11 BEATS
QRS DURATION: 86 MS
QT INTERVAL: 414 MS
QTC CALCULATION(BAZETT): 437 MS
QTC FREDERICIA: 429 MS
R AXIS: -3 DEGREES
T AXIS: 75 DEGREES
T OFFSET: 433 MS
VENTRICULAR RATE: 67 BPM

## 2025-05-12 ENCOUNTER — APPOINTMENT (OUTPATIENT)
Dept: PRIMARY CARE | Facility: CLINIC | Age: 80
End: 2025-05-12
Payer: MEDICARE

## 2025-05-12 VITALS
TEMPERATURE: 97.4 F | WEIGHT: 160 LBS | DIASTOLIC BLOOD PRESSURE: 75 MMHG | RESPIRATION RATE: 16 BRPM | BODY MASS INDEX: 29.26 KG/M2 | OXYGEN SATURATION: 95 % | HEART RATE: 69 BPM | SYSTOLIC BLOOD PRESSURE: 125 MMHG

## 2025-05-12 DIAGNOSIS — Z13.228 SCREENING FOR METABOLIC DISORDER: ICD-10-CM

## 2025-05-12 DIAGNOSIS — Z86.79 S/P ABLATION OPERATION FOR ARRHYTHMIA: ICD-10-CM

## 2025-05-12 DIAGNOSIS — E11.9 TYPE 2 DIABETES MELLITUS WITHOUT COMPLICATION, WITHOUT LONG-TERM CURRENT USE OF INSULIN: ICD-10-CM

## 2025-05-12 DIAGNOSIS — I10 HYPERTENSION, UNSPECIFIED TYPE: ICD-10-CM

## 2025-05-12 DIAGNOSIS — E11.22 TYPE 2 DIABETES MELLITUS WITH STAGE 3A CHRONIC KIDNEY DISEASE, WITH LONG-TERM CURRENT USE OF INSULIN (MULTI): ICD-10-CM

## 2025-05-12 DIAGNOSIS — Z79.4 LONG TERM (CURRENT) USE OF INSULIN (MULTI): ICD-10-CM

## 2025-05-12 DIAGNOSIS — N18.31 TYPE 2 DIABETES MELLITUS WITH STAGE 3A CHRONIC KIDNEY DISEASE, WITH LONG-TERM CURRENT USE OF INSULIN (MULTI): ICD-10-CM

## 2025-05-12 DIAGNOSIS — I48.0 PAROXYSMAL ATRIAL FIBRILLATION (MULTI): ICD-10-CM

## 2025-05-12 DIAGNOSIS — K21.9 GASTROESOPHAGEAL REFLUX DISEASE, UNSPECIFIED WHETHER ESOPHAGITIS PRESENT: Primary | ICD-10-CM

## 2025-05-12 DIAGNOSIS — R73.9 HYPERGLYCEMIA: ICD-10-CM

## 2025-05-12 DIAGNOSIS — Z79.4 TYPE 2 DIABETES MELLITUS WITH STAGE 3A CHRONIC KIDNEY DISEASE, WITH LONG-TERM CURRENT USE OF INSULIN (MULTI): ICD-10-CM

## 2025-05-12 DIAGNOSIS — E03.9 HYPOTHYROIDISM, UNSPECIFIED TYPE: ICD-10-CM

## 2025-05-12 DIAGNOSIS — R53.83 FATIGUE, UNSPECIFIED TYPE: ICD-10-CM

## 2025-05-12 DIAGNOSIS — Z98.890 S/P ABLATION OPERATION FOR ARRHYTHMIA: ICD-10-CM

## 2025-05-12 DIAGNOSIS — E53.8 VITAMIN B12 DEFICIENCY: ICD-10-CM

## 2025-05-12 DIAGNOSIS — Z78.0 POST-MENOPAUSAL: ICD-10-CM

## 2025-05-12 DIAGNOSIS — E55.9 VITAMIN D DEFICIENCY: ICD-10-CM

## 2025-05-12 PROCEDURE — 1159F MED LIST DOCD IN RCRD: CPT | Performed by: FAMILY MEDICINE

## 2025-05-12 PROCEDURE — 99214 OFFICE O/P EST MOD 30 MIN: CPT | Performed by: FAMILY MEDICINE

## 2025-05-12 PROCEDURE — 3078F DIAST BP <80 MM HG: CPT | Performed by: FAMILY MEDICINE

## 2025-05-12 PROCEDURE — 3074F SYST BP LT 130 MM HG: CPT | Performed by: FAMILY MEDICINE

## 2025-05-12 RX ORDER — METOPROLOL SUCCINATE 25 MG/1
12.5 TABLET, EXTENDED RELEASE ORAL 2 TIMES DAILY
Qty: 90 TABLET | Refills: 1 | Status: SHIPPED | OUTPATIENT
Start: 2025-05-12 | End: 2025-11-08

## 2025-05-12 RX ORDER — SPIRONOLACTONE 50 MG/1
50 TABLET, FILM COATED ORAL DAILY
Qty: 90 TABLET | Refills: 1 | Status: SHIPPED | OUTPATIENT
Start: 2025-05-12 | End: 2025-11-08

## 2025-05-12 RX ORDER — GLIPIZIDE 5 MG/1
10 TABLET ORAL
Qty: 360 TABLET | Refills: 1 | Status: SHIPPED | OUTPATIENT
Start: 2025-05-12 | End: 2025-11-08

## 2025-05-12 NOTE — PROGRESS NOTES
Subjective   Patient ID: Marissa Rebolledo is a 79 y.o. female who presents for Wrist Pain (Has been going on since January. ) and Blood Sugar Problem (This morning it was 141. She had coffee with cream and an hr later it was 184. Now it is 202. She has drinking ice water to bring it down. It will drop to about , sometimes to the 50s. ).    HPI  Patient comes in today with her daughter complaining of wrist pain which she has had for a while now.  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  Unfortunately she is unable to use NSAIDs because of the Eliquis she is on for her A-fib.  She has been recommended to use Tylenol 1000 mg 3 times a day as needed.    Patient also claims that she has aching in her legs after walking for 10 to 15 minutes.    She also has noticed a wide fluctuation of her sugars.  She has a freestyle nickie 3+ sensor now which is providing good readings.  Will check to see what her hemoglobin A1c is today.    Review of Systems   All other systems reviewed and are negative.      Objective   Vitals:  /75   Pulse 69   Temp 36.3 °C (97.4 °F)   Resp 16   Wt 72.6 kg (160 lb)   LMP  (LMP Unknown)   SpO2 95%   BMI 29.26 kg/m²     Physical Exam  Vitals reviewed.   Constitutional:       Appearance: She is well-developed.   HENT:      Head: Normocephalic and atraumatic.      Right Ear: Tympanic membrane, ear canal and external ear normal.      Left Ear: Tympanic membrane, ear canal and external ear normal.      Nose: Nose normal.      Mouth/Throat:      Mouth: Mucous membranes are moist.      Pharynx: Oropharynx is clear.   Eyes:      Extraocular Movements: Extraocular movements intact.      Conjunctiva/sclera: Conjunctivae normal.      Pupils: Pupils are equal, round, and reactive to light.   Cardiovascular:      Rate and Rhythm: Normal rate and regular rhythm.      Heart sounds: Normal heart sounds. No murmur  heard.  Pulmonary:      Effort: Pulmonary effort is normal.      Breath sounds: Normal breath sounds. No wheezing.   Abdominal:      General: Abdomen is flat. Bowel sounds are normal.      Palpations: Abdomen is soft.   Musculoskeletal:         General: Tenderness (Pain in the left hand and wrist) present. Normal range of motion.   Skin:     General: Skin is warm and dry.   Neurological:      General: No focal deficit present.      Mental Status: She is alert and oriented to person, place, and time. Mental status is at baseline.   Psychiatric:         Mood and Affect: Mood normal.         Behavior: Behavior normal.         Thought Content: Thought content normal.         Judgment: Judgment normal.       CBC -  Recent Labs     05/12/25  1120 01/06/25  1025 10/28/24  1323   WBC 8.3 7.1 8.4   HGB 13.3 12.9 13.2   HCT 41.0 40.3 43.4    198 246   MCV 88.0 86 83       CMP -  Recent Labs     05/12/25  1120 01/06/25  1025 10/28/24  1323 06/27/24  2048 06/27/24  1635 01/16/23  1412 11/05/22  0609    141 141  --  136   < > 140   K 5.6* 4.8 4.9   < > 5.7*   < > 3.9    103 103  --  102   < > 104   CO2 27 30 28  --  26   < > 26   ANIONGAP 9 13 15  --  14   < > 14   BUN 15 14 13  --  12   < > 15   CREATININE 1.10* 1.02 1.07*  --  0.91   < > 0.90   EGFR 51* 56* 54*  --  65   < >  --    MG  --   --   --   --  1.71  --  1.83    < > = values in this interval not displayed.     Recent Labs     05/12/25  1120 01/06/25  1025 06/27/24  1635   ALBUMIN 4.6 4.0 4.3   ALKPHOS 76 80 68   ALT 15 28 11   AST 22 25 37   BILITOT 0.4 0.4 0.4       LIPID PANEL -   Recent Labs     01/06/25  1025 06/24/24  1222 01/31/24  1305 05/01/23  1136 02/11/21  1432 10/17/19  1242   CHOL 144 122 148 150 144 144   LDLCALC 73 47 64  --   --   --    LDLF  --   --   --  77 73 75   HDL 44.6 32.6 39.2 49.4 37.0* 37.1*   TRIG 132 213* 224* 117 168* 162*       Recent Labs     05/12/25  1120 01/06/25  1025 06/27/24  1636 06/24/24  1222 01/16/23  1412  11/03/22  1437   BNP  --   --  321*  --   --  719*   HGBA1C 8.1* 7.9*  --  8.1*   < >  --     < > = values in this interval not displayed.       Lab Results   Component Value Date    RAZFBSPQ61 535 05/12/2025       Lab Results   Component Value Date    VITD25 37 05/12/2025        Assessment/Plan   Problem List Items Addressed This Visit       Type 2 diabetes mellitus with stage 3a chronic kidney disease, with long-term current use of insulin (Multi)    Overview   Continue use of freestyle nickie 3+         Relevant Medications    glipiZIDE (Glucotrol) 5 mg tablet    Fatigue    Relevant Orders    TSH with reflex to Free T4 if abnormal (Completed)    GERD (gastroesophageal reflux disease) - Primary    Hypertension    Overview   Stable on current medicines         Relevant Medications    spironolactone (Aldactone) 50 mg tablet    Vitamin D deficiency    Relevant Orders    Vitamin D 25-Hydroxy,Total (for eval of Vitamin D levels) (Completed)    Hypothyroidism    S/P ablation operation for arrhythmia    Overview   Ablation for atrial fib 11/6/2024 [facility] Dr. [facility]         Relevant Medications    metoprolol succinate XL (Toprol-XL) 25 mg 24 hr tablet     Other Visit Diagnoses         Long term (current) use of insulin (Multi)          Paroxysmal atrial fibrillation (Multi)        Relevant Medications    metoprolol succinate XL (Toprol-XL) 25 mg 24 hr tablet      Post-menopausal        Relevant Orders    XR DEXA bone density      Vitamin B12 deficiency        Relevant Orders    CBC (Completed)    Vitamin B12 (Completed)      Screening for metabolic disorder        Relevant Orders    Comprehensive Metabolic Panel (Completed)      Hyperglycemia        Relevant Orders    Hemoglobin A1C (Completed)        Will contact patient with lab results when available.  Medication list reconciled.  Thank you for visiting today!      Professional services: Some of this note was completed using Dragon voice technology and  sometimes the software misinterprets words. This may include unintended errors with respect to translation of words, typographical errors or grammar errors which may not have been identified prior to finalization of the chart note. Please take this into account when reading the note. Thank you.       Chris Contreras DO 05/13/25 9:34 AM

## 2025-05-13 PROBLEM — E11.22 TYPE 2 DIABETES MELLITUS WITH STAGE 3A CHRONIC KIDNEY DISEASE, WITH LONG-TERM CURRENT USE OF INSULIN (MULTI): Status: ACTIVE | Noted: 2023-03-10

## 2025-05-13 PROBLEM — J44.9 COPD (CHRONIC OBSTRUCTIVE PULMONARY DISEASE) (MULTI): Status: RESOLVED | Noted: 2024-11-07 | Resolved: 2025-05-13

## 2025-05-13 PROBLEM — N18.31 TYPE 2 DIABETES MELLITUS WITH STAGE 3A CHRONIC KIDNEY DISEASE, WITH LONG-TERM CURRENT USE OF INSULIN (MULTI): Status: ACTIVE | Noted: 2023-03-10

## 2025-05-13 PROBLEM — I50.22 CHRONIC SYSTOLIC HEART FAILURE: Status: RESOLVED | Noted: 2022-11-08 | Resolved: 2025-05-13

## 2025-05-13 PROBLEM — Z79.4 TYPE 2 DIABETES MELLITUS WITH STAGE 3A CHRONIC KIDNEY DISEASE, WITH LONG-TERM CURRENT USE OF INSULIN (MULTI): Status: ACTIVE | Noted: 2023-03-10

## 2025-05-13 LAB
25(OH)D3+25(OH)D2 SERPL-MCNC: 37 NG/ML (ref 30–100)
ALBUMIN SERPL-MCNC: 4.6 G/DL (ref 3.6–5.1)
ALP SERPL-CCNC: 76 U/L (ref 37–153)
ALT SERPL-CCNC: 15 U/L (ref 6–29)
ANION GAP SERPL CALCULATED.4IONS-SCNC: 9 MMOL/L (CALC) (ref 7–17)
AST SERPL-CCNC: 22 U/L (ref 10–35)
BILIRUB SERPL-MCNC: 0.4 MG/DL (ref 0.2–1.2)
BUN SERPL-MCNC: 15 MG/DL (ref 7–25)
CALCIUM SERPL-MCNC: 9.8 MG/DL (ref 8.6–10.4)
CHLORIDE SERPL-SCNC: 101 MMOL/L (ref 98–110)
CO2 SERPL-SCNC: 27 MMOL/L (ref 20–32)
CREAT SERPL-MCNC: 1.1 MG/DL (ref 0.6–1)
EGFRCR SERPLBLD CKD-EPI 2021: 51 ML/MIN/1.73M2
ERYTHROCYTE [DISTWIDTH] IN BLOOD BY AUTOMATED COUNT: 13.2 % (ref 11–15)
EST. AVERAGE GLUCOSE BLD GHB EST-MCNC: 186 MG/DL
EST. AVERAGE GLUCOSE BLD GHB EST-SCNC: 10.3 MMOL/L
GLUCOSE SERPL-MCNC: 153 MG/DL (ref 65–139)
HBA1C MFR BLD: 8.1 %
HCT VFR BLD AUTO: 41 % (ref 35–45)
HGB BLD-MCNC: 13.3 G/DL (ref 11.7–15.5)
MCH RBC QN AUTO: 28.5 PG (ref 27–33)
MCHC RBC AUTO-ENTMCNC: 32.4 G/DL (ref 32–36)
MCV RBC AUTO: 88 FL (ref 80–100)
PLATELET # BLD AUTO: 234 THOUSAND/UL (ref 140–400)
PMV BLD REES-ECKER: 10.4 FL (ref 7.5–12.5)
POTASSIUM SERPL-SCNC: 5.6 MMOL/L (ref 3.5–5.3)
PROT SERPL-MCNC: 7.4 G/DL (ref 6.1–8.1)
RBC # BLD AUTO: 4.66 MILLION/UL (ref 3.8–5.1)
SODIUM SERPL-SCNC: 137 MMOL/L (ref 135–146)
TSH SERPL-ACNC: 1.47 MIU/L (ref 0.4–4.5)
VIT B12 SERPL-MCNC: 535 PG/ML (ref 200–1100)
WBC # BLD AUTO: 8.3 THOUSAND/UL (ref 3.8–10.8)

## 2025-05-14 DIAGNOSIS — E11.9 TYPE 2 DIABETES MELLITUS WITHOUT COMPLICATION, WITHOUT LONG-TERM CURRENT USE OF INSULIN: ICD-10-CM

## 2025-05-14 NOTE — RESULT ENCOUNTER NOTE
Patient notified of results.    Your kidney function is slightly diminished with your creatinine at 1.10 and GFR at 51.  These numbers are slightly worse than they were back in January.  Would recommend that you drink at least 32 ounces of water a day and that we work on getting your blood sugar under better control.    Your hemoglobin A1c is high at 8.1.  For diabetics we do not want to see this higher than 7.0.   I am going to ask our clinical pharmacist, Malissa, to help us out and work with you and see if there is some new and different medicines we can use to bring your sugar down.  Be expecting a call from her.    Your TSH, CBC and B12 all look good.    Your vitamin D level was low normal at 37.  It should be between 30 and 100 the closer to 50 the better. It is an important vitamin for your heart, lungs, immune system and bones among other things in your body. Would recommend over the counter vitamin D3 2000 units daily to get it into and keep it in the normal range and recheck in September 2025.     Have a Great Day and Weekend!!!    Dr. Contreras

## 2025-05-30 ENCOUNTER — APPOINTMENT (OUTPATIENT)
Dept: PHARMACY | Facility: HOSPITAL | Age: 80
End: 2025-05-30
Payer: MEDICARE

## 2025-06-02 ENCOUNTER — APPOINTMENT (OUTPATIENT)
Dept: PHARMACY | Facility: HOSPITAL | Age: 80
End: 2025-06-02
Payer: MEDICARE

## 2025-06-02 DIAGNOSIS — Z79.4 TYPE 2 DIABETES MELLITUS WITHOUT COMPLICATION, WITH LONG-TERM CURRENT USE OF INSULIN: ICD-10-CM

## 2025-06-02 DIAGNOSIS — E11.9 TYPE 2 DIABETES MELLITUS WITHOUT COMPLICATION, WITH LONG-TERM CURRENT USE OF INSULIN: ICD-10-CM

## 2025-06-02 DIAGNOSIS — I48.91 ATRIAL FIBRILLATION, UNSPECIFIED TYPE (MULTI): ICD-10-CM

## 2025-06-02 DIAGNOSIS — E11.9 TYPE 2 DIABETES MELLITUS WITHOUT COMPLICATION, WITHOUT LONG-TERM CURRENT USE OF INSULIN: ICD-10-CM

## 2025-06-05 RX ORDER — INSULIN LISPRO 100 [IU]/ML
INJECTION, SOLUTION INTRAVENOUS; SUBCUTANEOUS
Qty: 15 ML | Refills: 3 | Status: SHIPPED | OUTPATIENT
Start: 2025-06-05

## 2025-06-05 NOTE — PROGRESS NOTES
Subjective     Patient ID: Marissa Rebolledo is a 79 y.o. female who presents for No chief complaint on file..    HPI    Allergies[1]    Objective     Current DM Pharmacotherapy:   -metformin xr 500 mg 2 po bid  -Humalog sliding scale  -glipizide 10 mg bid    SECONDARY PREVENTION  - Statin? Yes  - ACE-I/ARB? Yes  - Aspirin? No    Current monitoring regimen:   Patient is using: continuous glucose monitor    Pertinent PMH Review:  - PMH of Pancreatitis: No  - PMH/FH of Medullary Thyroid Cancer: No  - PMH of Retinopathy: No  - PMH of Urinary Tract Infections: No    Lab Review  Lab Results   Component Value Date    BILITOT 0.4 05/12/2025    CALCIUM 9.8 05/12/2025    CO2 27 05/12/2025     05/12/2025    CREATININE 1.10 (H) 05/12/2025    GLUCOSE 153 (H) 05/12/2025    ALKPHOS 76 05/12/2025    K 5.6 (H) 05/12/2025    PROT 7.4 05/12/2025     05/12/2025    AST 22 05/12/2025    ALT 15 05/12/2025    BUN 15 05/12/2025    ANIONGAP 9 05/12/2025    MG 1.71 06/27/2024    ALBUMIN 4.6 05/12/2025    GFRF 63 08/01/2023     Lab Results   Component Value Date    TRIG 132 01/06/2025    CHOL 144 01/06/2025    LDLCALC 73 01/06/2025    HDL 44.6 01/06/2025     Lab Results   Component Value Date    HGBA1C 8.1 (H) 05/12/2025     The 10-year ASCVD risk score (Domi DK, et al., 2019) is: 45.2%    Values used to calculate the score:      Age: 79 years      Sex: Female      Is Non- : No      Diabetic: Yes      Tobacco smoker: No      Systolic Blood Pressure: 125 mmHg      Is BP treated: Yes      HDL Cholesterol: 44.6 mg/dL      Total Cholesterol: 144 mg/dL    Assessment/Plan     Problem List Items Addressed This Visit    None  Visit Diagnoses         Type 2 diabetes mellitus without complication, without long-term current use of insulin            Patient was referred for cost assistance and diabetes management. Discussed [facility] P[facility] and patient seems to qualify. Patient to drop of 1099. Will follow up in 1 week to  verify [facility] PAP is approved.    Type 2 diabetes mellitus, is not at goal. <7%    Follow up: I recommend diabetes care be 1 week.    Malissa Rincon, PharmD    Continue all meds under the continuation of care with the referring provider and clinical pharmacy team       [1]   Allergies  Allergen Reactions    Metformin Headache and GI Upset     Abdominal pain    Mirtazapine Hives    Rosuvastatin Myalgia     Muscle spasm/weakness    Simvastatin Myalgia     Muscle spasms/weakness  Leg cramps     Sulfamethoxazole-Trimethoprim Hives    Colchicine Other     Did not feel well with it    Jardiance [Empagliflozin] Other    Alendronic Acid Other    Clarithromycin Other     weak    Fosamax [Alendronate] Other     weak    Nitrofurantoin Monohyd/M-Cryst Other     weak    Ofloxacin Other     weakness

## 2025-06-11 ENCOUNTER — HOSPITAL ENCOUNTER (OUTPATIENT)
Dept: RADIOLOGY | Facility: CLINIC | Age: 80
Discharge: HOME | End: 2025-06-11
Payer: MEDICARE

## 2025-06-11 ENCOUNTER — HOSPITAL ENCOUNTER (OUTPATIENT)
Dept: CARDIOLOGY | Facility: CLINIC | Age: 80
Discharge: HOME | End: 2025-06-11
Payer: MEDICARE

## 2025-06-11 DIAGNOSIS — Z78.0 POST-MENOPAUSAL: ICD-10-CM

## 2025-06-11 DIAGNOSIS — Z86.79 S/P ABLATION OPERATION FOR ARRHYTHMIA: ICD-10-CM

## 2025-06-11 DIAGNOSIS — I42.9 CARDIOMYOPATHY, UNSPECIFIED TYPE (MULTI): ICD-10-CM

## 2025-06-11 DIAGNOSIS — Z98.890 S/P ABLATION OPERATION FOR ARRHYTHMIA: ICD-10-CM

## 2025-06-11 PROCEDURE — 77080 DXA BONE DENSITY AXIAL: CPT

## 2025-06-11 PROCEDURE — 93308 TTE F-UP OR LMTD: CPT

## 2025-06-11 PROCEDURE — RXMED WILLOW AMBULATORY MEDICATION CHARGE

## 2025-06-12 ENCOUNTER — APPOINTMENT (OUTPATIENT)
Dept: PHARMACY | Facility: HOSPITAL | Age: 80
End: 2025-06-12
Payer: MEDICARE

## 2025-06-12 DIAGNOSIS — E11.9 TYPE 2 DIABETES MELLITUS WITHOUT COMPLICATION, WITHOUT LONG-TERM CURRENT USE OF INSULIN: ICD-10-CM

## 2025-06-12 PROCEDURE — RXMED WILLOW AMBULATORY MEDICATION CHARGE

## 2025-06-12 RX ORDER — DULAGLUTIDE 0.75 MG/.5ML
0.75 INJECTION, SOLUTION SUBCUTANEOUS
Qty: 2 ML | Refills: 0 | Status: SHIPPED | OUTPATIENT
Start: 2025-06-15 | End: 2025-07-13

## 2025-06-12 NOTE — PROGRESS NOTES
Subjective     Patient ID: Marissa Rebolledo is a 79 y.o. female who presents for No chief complaint on file..    HPI    Allergies[1]    Objective     Current DM Pharmacotherapy:   -metformin xr 500 mg 2 po bid  -Humalog sliding scale  -glipizide 10 mg bid    SECONDARY PREVENTION  - Statin? Yes  - ACE-I/ARB? Yes  - Aspirin? No    Current monitoring regimen:   Patient is using: continuous glucose monitor    Pertinent PMH Review:  - PMH of Pancreatitis: No  - PMH/FH of Medullary Thyroid Cancer: No  - PMH of Retinopathy: No  - PMH of Urinary Tract Infections: No    Lab Review  Lab Results   Component Value Date    BILITOT 0.4 05/12/2025    CALCIUM 9.8 05/12/2025    CO2 27 05/12/2025     05/12/2025    CREATININE 1.10 (H) 05/12/2025    GLUCOSE 153 (H) 05/12/2025    ALKPHOS 76 05/12/2025    K 5.6 (H) 05/12/2025    PROT 7.4 05/12/2025     05/12/2025    AST 22 05/12/2025    ALT 15 05/12/2025    BUN 15 05/12/2025    ANIONGAP 9 05/12/2025    MG 1.71 06/27/2024    ALBUMIN 4.6 05/12/2025    GFRF 63 08/01/2023     Lab Results   Component Value Date    TRIG 132 01/06/2025    CHOL 144 01/06/2025    LDLCALC 73 01/06/2025    HDL 44.6 01/06/2025     Lab Results   Component Value Date    HGBA1C 8.1 (H) 05/12/2025     The 10-year ASCVD risk score (Domi DK, et al., 2019) is: 45.2%    Values used to calculate the score:      Age: 79 years      Sex: Female      Is Non- : No      Diabetic: Yes      Tobacco smoker: No      Systolic Blood Pressure: 125 mmHg      Is BP treated: Yes      HDL Cholesterol: 44.6 mg/dL      Total Cholesterol: 144 mg/dL    Assessment/Plan     Problem List Items Addressed This Visit    None  Visit Diagnoses         Type 2 diabetes mellitus without complication, without long-term current use of insulin        Relevant Medications    dulaglutide (Trulicity) 0.75 mg/0.5 mL pen injector        Patient's  PAP was approved. Will start her on Trulicity and will follow up in 1 week to  do in person demo.    Type 2 diabetes mellitus, is not at goal. <7%    Follow up: I recommend diabetes care be 1 week.    Malissa Rincon, PharmD    Continue all meds under the continuation of care with the referring provider and clinical pharmacy team    Trulicity Education:    - Counseled patient on Trulicity MOA, expectations, side effects, duration of therapy, administration, and monitoring parameters.  - Provided detailed dosing and administration counseling to ensure proper technique.   - Reviewed Trulicity titration schedule, starting with 0.75 mg once weekly to 1.5 mg, 3 mg, and if tolerated 4.5 mg.  - Counseled patient on the benefits of GLP-1ra, such as cardiovascular risk reduction, glycemic control, and weight loss potential.  - Reviewed storage requirements of Trulicity when not in use, and when to administer the medication if a dose is missed.  - Advised patient that they may experience improved satiety after meals and portion sizes of meals may be reduced as doses of Trulicity increase.         [1]   Allergies  Allergen Reactions    Metformin Headache and GI Upset     Abdominal pain    Mirtazapine Hives    Rosuvastatin Myalgia     Muscle spasm/weakness    Simvastatin Myalgia     Muscle spasms/weakness  Leg cramps     Sulfamethoxazole-Trimethoprim Hives    Colchicine Other     Did not feel well with it    Jardiance [Empagliflozin] Other    Alendronic Acid Other    Clarithromycin Other     weak    Fosamax [Alendronate] Other     weak    Nitrofurantoin Monohyd/M-Cryst Other     weak    Ofloxacin Other     weakness

## 2025-06-14 ENCOUNTER — PHARMACY VISIT (OUTPATIENT)
Dept: PHARMACY | Facility: CLINIC | Age: 80
End: 2025-06-14
Payer: COMMERCIAL

## 2025-06-16 ENCOUNTER — TELEPHONE (OUTPATIENT)
Dept: PRIMARY CARE | Facility: CLINIC | Age: 80
End: 2025-06-16
Payer: MEDICARE

## 2025-06-16 DIAGNOSIS — M85.80 OSTEOPENIA, UNSPECIFIED LOCATION: Primary | ICD-10-CM

## 2025-06-16 LAB
AORTIC VALVE MEAN GRADIENT: 4 MMHG
AORTIC VALVE PEAK VELOCITY: 1.32 M/S
AV PEAK GRADIENT: 7 MMHG
EJECTION FRACTION APICAL 4 CHAMBER: 59.5
EJECTION FRACTION: 61 %
LEFT VENTRICLE INTERNAL DIMENSION DIASTOLE: 4.64 CM (ref 3.5–6)
MITRAL VALVE E/A RATIO: 0.95
RIGHT VENTRICLE PEAK SYSTOLIC PRESSURE: 30 MMHG

## 2025-06-16 NOTE — TELEPHONE ENCOUNTER
----- Message from Chris Contreras sent at 6/16/2025  7:40 AM EDT -----  Please notify patient that her DEXA scan is showing borderline osteoporosis at -2.3.  She has not tolerated Fosamax.  Would recommend that she see endocrinology for further evaluation and   recommendations.  Referral placed in patient's chart.  ----- Message -----  From: Interface, Radiology Results In  Sent: 6/13/2025   4:04 PM EDT  To: Chris Contreras, DO

## 2025-06-18 ENCOUNTER — TELEMEDICINE (OUTPATIENT)
Dept: PHARMACY | Facility: HOSPITAL | Age: 80
End: 2025-06-18
Payer: MEDICARE

## 2025-06-18 DIAGNOSIS — E11.9 TYPE 2 DIABETES MELLITUS WITHOUT COMPLICATION, WITHOUT LONG-TERM CURRENT USE OF INSULIN: ICD-10-CM

## 2025-06-18 NOTE — PROGRESS NOTES
Subjective     Patient ID: Marissa Rebolledo is a 79 y.o. female who presents for No chief complaint on file..    HPI    Allergies[1]    Objective     Current DM Pharmacotherapy:   -metformin xr 500 mg 2 po bid  -Humalog sliding scale  -glipizide 5 mg 1 am 2 hs    SECONDARY PREVENTION  - Statin? Yes  - ACE-I/ARB? Yes  - Aspirin? No    Current monitoring regimen:   Patient is using: continuous glucose monitor    Pertinent PMH Review:  - PMH of Pancreatitis: No  - PMH/FH of Medullary Thyroid Cancer: No  - PMH of Retinopathy: No  - PMH of Urinary Tract Infections: No    Lab Review  Lab Results   Component Value Date    BILITOT 0.4 05/12/2025    CALCIUM 9.8 05/12/2025    CO2 27 05/12/2025     05/12/2025    CREATININE 1.10 (H) 05/12/2025    GLUCOSE 153 (H) 05/12/2025    ALKPHOS 76 05/12/2025    K 5.6 (H) 05/12/2025    PROT 7.4 05/12/2025     05/12/2025    AST 22 05/12/2025    ALT 15 05/12/2025    BUN 15 05/12/2025    ANIONGAP 9 05/12/2025    MG 1.71 06/27/2024    ALBUMIN 4.6 05/12/2025    GFRF 63 08/01/2023     Lab Results   Component Value Date    TRIG 132 01/06/2025    CHOL 144 01/06/2025    LDLCALC 73 01/06/2025    HDL 44.6 01/06/2025     Lab Results   Component Value Date    HGBA1C 8.1 (H) 05/12/2025     The 10-year ASCVD risk score (Domi QUINN, et al., 2019) is: 45.2%    Values used to calculate the score:      Age: 79 years      Sex: Female      Is Non- : No      Diabetic: Yes      Tobacco smoker: No      Systolic Blood Pressure: 125 mmHg      Is BP treated: Yes      HDL Cholesterol: 44.6 mg/dL      Total Cholesterol: 144 mg/dL    Assessment/Plan     Problem List Items Addressed This Visit    None  Visit Diagnoses         Type 2 diabetes mellitus without complication, without long-term current use of insulin            Preformed in person demo for Trulicity. Answered all questions and concerns and will follow up in 2 weeks.    Type 2 diabetes mellitus, is not at goal. <7%    START  Trulicity 0.75 mg weekly  DECREASE glipizide to 5 mg bid  Follow up: I recommend diabetes care be 2 weeks.    Malissa Rincon, PharmD    Continue all meds under the continuation of care with the referring provider and clinical pharmacy team    Trulicity Education:    - Counseled patient on Trulicity MOA, expectations, side effects, duration of therapy, administration, and monitoring parameters.  - Provided detailed dosing and administration counseling to ensure proper technique.   - Reviewed Trulicity titration schedule, starting with 0.75 mg once weekly to 1.5 mg, 3 mg, and if tolerated 4.5 mg.  - Counseled patient on the benefits of GLP-1ra, such as cardiovascular risk reduction, glycemic control, and weight loss potential.  - Reviewed storage requirements of Trulicity when not in use, and when to administer the medication if a dose is missed.  - Advised patient that they may experience improved satiety after meals and portion sizes of meals may be reduced as doses of Trulicity increase.         [1]   Allergies  Allergen Reactions    Metformin Headache and GI Upset     Abdominal pain    Mirtazapine Hives    Rosuvastatin Myalgia     Muscle spasm/weakness    Simvastatin Myalgia     Muscle spasms/weakness  Leg cramps     Sulfamethoxazole-Trimethoprim Hives    Colchicine Other     Did not feel well with it    Jardiance [Empagliflozin] Other    Alendronic Acid Other    Clarithromycin Other     weak    Fosamax [Alendronate] Other     weak    Nitrofurantoin Monohyd/M-Cryst Other     weak    Ofloxacin Other     weakness

## 2025-06-26 DIAGNOSIS — F41.9 ANXIETY: ICD-10-CM

## 2025-06-26 RX ORDER — VENLAFAXINE HYDROCHLORIDE 75 MG/1
75 CAPSULE, EXTENDED RELEASE ORAL EVERY MORNING
Qty: 30 CAPSULE | Refills: 3 | Status: SHIPPED | OUTPATIENT
Start: 2025-06-26 | End: 2025-10-24

## 2025-06-26 NOTE — TELEPHONE ENCOUNTER
Pt called in for refill of effexor 75 mg. She takes this medications PRN in the morning for anxiety along with the 150 mg she takes daily at night.   The medication is shown as   Discontinued by: Abhilash Vieira MD on 3/5/2024 11:49   Reason: Med List Cleanup     She was advised to reach out to that provider to see why they would cancel it and that this may not be filled until PCP is back to review.     Requested Prescriptions     Pending Prescriptions Disp Refills    venlafaxine XR (Effexor-XR) 75 mg 24 hr capsule 30 capsule 0     Sig: Take 1 capsule (75 mg) by mouth once daily in the morning. Take with food.

## 2025-07-02 ENCOUNTER — APPOINTMENT (OUTPATIENT)
Dept: PHARMACY | Facility: HOSPITAL | Age: 80
End: 2025-07-02
Payer: MEDICARE

## 2025-07-02 DIAGNOSIS — E11.9 TYPE 2 DIABETES MELLITUS WITHOUT COMPLICATION, WITHOUT LONG-TERM CURRENT USE OF INSULIN: ICD-10-CM

## 2025-07-02 PROCEDURE — RXMED WILLOW AMBULATORY MEDICATION CHARGE

## 2025-07-03 ENCOUNTER — PHARMACY VISIT (OUTPATIENT)
Dept: PHARMACY | Facility: CLINIC | Age: 80
End: 2025-07-03
Payer: COMMERCIAL

## 2025-07-07 DIAGNOSIS — K21.9 GASTROESOPHAGEAL REFLUX DISEASE, UNSPECIFIED WHETHER ESOPHAGITIS PRESENT: ICD-10-CM

## 2025-07-07 DIAGNOSIS — E03.9 HYPOTHYROIDISM, UNSPECIFIED TYPE: ICD-10-CM

## 2025-07-07 PROCEDURE — RXMED WILLOW AMBULATORY MEDICATION CHARGE

## 2025-07-07 RX ORDER — ESOMEPRAZOLE MAGNESIUM 40 MG/1
40 CAPSULE, DELAYED RELEASE ORAL
Qty: 90 CAPSULE | Refills: 1 | Status: SHIPPED | OUTPATIENT
Start: 2025-07-07 | End: 2026-01-03

## 2025-07-07 RX ORDER — DULAGLUTIDE 1.5 MG/.5ML
1.5 INJECTION, SOLUTION SUBCUTANEOUS WEEKLY
Qty: 2 ML | Refills: 0 | Status: SHIPPED | OUTPATIENT
Start: 2025-07-07

## 2025-07-07 RX ORDER — LEVOTHYROXINE SODIUM 50 UG/1
50 TABLET ORAL DAILY
Qty: 90 TABLET | Refills: 1 | Status: SHIPPED | OUTPATIENT
Start: 2025-07-07 | End: 2026-01-03

## 2025-07-07 NOTE — TELEPHONE ENCOUNTER
Spoke with pt, jyotsna and metop have remaining refill.     Requested Prescriptions     Pending Prescriptions Disp Refills    esomeprazole (NexIUM) 40 mg DR capsule 90 capsule 1     Sig: Take 1 capsule (40 mg) by mouth once daily in the morning. Take before meals.    levothyroxine (Synthroid, Levoxyl) 50 mcg tablet 90 tablet 1     Sig: Take 1 tablet (50 mcg) by mouth once daily.

## 2025-07-07 NOTE — PROGRESS NOTES
Subjective     Patient ID: Marissa Rebolledo is a 79 y.o. female who presents for No chief complaint on file..    HPI    Allergies[1]    Objective     Current DM Pharmacotherapy:   -metformin xr 500 mg 2 po bid  -Humalog sliding scale  -glipizide 5 mg 1 bid  -Trulicity 0.75 mg weekly    SECONDARY PREVENTION  - Statin? Yes  - ACE-I/ARB? Yes  - Aspirin? No    Current monitoring regimen:   Patient is using: continuous glucose monitor    115 in the am  76 in the afternoon    145 this morning     Pertinent PMH Review:  - PMH of Pancreatitis: No  - PMH/FH of Medullary Thyroid Cancer: No  - PMH of Retinopathy: No  - PMH of Urinary Tract Infections: No    Lab Review  Lab Results   Component Value Date    BILITOT 0.4 05/12/2025    CALCIUM 9.8 05/12/2025    CO2 27 05/12/2025     05/12/2025    CREATININE 1.10 (H) 05/12/2025    GLUCOSE 153 (H) 05/12/2025    ALKPHOS 76 05/12/2025    K 5.6 (H) 05/12/2025    PROT 7.4 05/12/2025     05/12/2025    AST 22 05/12/2025    ALT 15 05/12/2025    BUN 15 05/12/2025    ANIONGAP 9 05/12/2025    MG 1.71 06/27/2024    ALBUMIN 4.6 05/12/2025    GFRF 63 08/01/2023     Lab Results   Component Value Date    TRIG 132 01/06/2025    CHOL 144 01/06/2025    LDLCALC 73 01/06/2025    HDL 44.6 01/06/2025     Lab Results   Component Value Date    HGBA1C 8.1 (H) 05/12/2025     The 10-year ASCVD risk score (Domi QUINN, et al., 2019) is: 45.2%    Values used to calculate the score:      Age: 79 years      Sex: Female      Is Non- : No      Diabetic: Yes      Tobacco smoker: No      Systolic Blood Pressure: 125 mmHg      Is BP treated: Yes      HDL Cholesterol: 44.6 mg/dL      Total Cholesterol: 144 mg/dL    Assessment/Plan     Problem List Items Addressed This Visit    None  Visit Diagnoses         Type 2 diabetes mellitus without complication, without long-term current use of insulin            Patient had no issues with starting Trulicity. Will increase to 1.5 mg weekly to  help decrease glipizide. Will follow up in 4 weeks.    Type 2 diabetes mellitus, is not at goal. <7%    INCREASE Trulicity to 1.5 mg weekly  DECREASE glipizide to 5 mg daily  Follow up: I recommend diabetes care be 4 weeks.    Malissa Rincon, PharmD    Continue all meds under the continuation of care with the referring provider and clinical pharmacy team    Trulicity Education:    - Counseled patient on Trulicity MOA, expectations, side effects, duration of therapy, administration, and monitoring parameters.  - Provided detailed dosing and administration counseling to ensure proper technique.   - Reviewed Trulicity titration schedule, starting with 0.75 mg once weekly to 1.5 mg, 3 mg, and if tolerated 4.5 mg.  - Counseled patient on the benefits of GLP-1ra, such as cardiovascular risk reduction, glycemic control, and weight loss potential.  - Reviewed storage requirements of Trulicity when not in use, and when to administer the medication if a dose is missed.  - Advised patient that they may experience improved satiety after meals and portion sizes of meals may be reduced as doses of Trulicity increase.           [1]   Allergies  Allergen Reactions    Metformin Headache and GI Upset     Abdominal pain    Mirtazapine Hives    Rosuvastatin Myalgia     Muscle spasm/weakness    Simvastatin Myalgia     Muscle spasms/weakness  Leg cramps     Sulfamethoxazole-Trimethoprim Hives    Colchicine Other     Did not feel well with it    Jardiance [Empagliflozin] Other    Alendronic Acid Other    Clarithromycin Other     weak    Fosamax [Alendronate] Other     weak    Nitrofurantoin Monohyd/M-Cryst Other     weak    Ofloxacin Other     weakness

## 2025-07-07 NOTE — TELEPHONE ENCOUNTER
Rx Refill Request Telephone Encounter    Name:  Marissa Rebolledo  :  [id number]  Medication Name:    metoprolol succinate XL (Toprol-XL) 25 mg 24 hr tablet   spironolactone (Aldactone) 50 mg tablet   levothyroxine (Synthroid, Levoxyl) 50 mcg tablet   esomeprazole (NexIUM) 40 mg DR capsule   atorvastatin (Lipitor) 20 mg tablet   Specific Pharmacy location:    [id number] - [place], [address] AT [facility] & [address], [facility] [address]  Phone: [phone]  Fax: [phone]

## 2025-07-08 ENCOUNTER — PHARMACY VISIT (OUTPATIENT)
Dept: PHARMACY | Facility: CLINIC | Age: 80
End: 2025-07-08
Payer: COMMERCIAL

## 2025-07-18 DIAGNOSIS — E78.5 HYPERLIPIDEMIA, UNSPECIFIED HYPERLIPIDEMIA TYPE: ICD-10-CM

## 2025-07-18 RX ORDER — ATORVASTATIN CALCIUM 20 MG/1
20 TABLET, FILM COATED ORAL DAILY
Qty: 90 TABLET | Refills: 1 | Status: SHIPPED | OUTPATIENT
Start: 2025-07-18 | End: 2026-01-14

## 2025-07-18 NOTE — TELEPHONE ENCOUNTER
Pt last OV 5/12/2025  Next OV Visit date not found    Requested Prescriptions     Pending Prescriptions Disp Refills    atorvastatin (Lipitor) 20 mg tablet 90 tablet 1     Sig: Take 1 tablet (20 mg) by mouth once daily.

## 2025-07-20 ENCOUNTER — APPOINTMENT (OUTPATIENT)
Dept: CARDIOLOGY | Facility: HOSPITAL | Age: 80
End: 2025-07-20
Payer: MEDICARE

## 2025-07-20 ENCOUNTER — APPOINTMENT (OUTPATIENT)
Dept: RADIOLOGY | Facility: HOSPITAL | Age: 80
End: 2025-07-20
Payer: MEDICARE

## 2025-07-20 ENCOUNTER — HOSPITAL ENCOUNTER (INPATIENT)
Facility: HOSPITAL | Age: 80
LOS: 3 days | Discharge: HOME | End: 2025-07-23
Attending: EMERGENCY MEDICINE | Admitting: STUDENT IN AN ORGANIZED HEALTH CARE EDUCATION/TRAINING PROGRAM
Payer: MEDICARE

## 2025-07-20 DIAGNOSIS — N17.9 ACUTE KIDNEY INJURY: ICD-10-CM

## 2025-07-20 DIAGNOSIS — Z98.890 S/P ABLATION OPERATION FOR ARRHYTHMIA: ICD-10-CM

## 2025-07-20 DIAGNOSIS — Z86.79 S/P ABLATION OPERATION FOR ARRHYTHMIA: ICD-10-CM

## 2025-07-20 DIAGNOSIS — I48.91 ATRIAL FIBRILLATION WITH RVR (MULTI): Primary | ICD-10-CM

## 2025-07-20 DIAGNOSIS — E87.5 HYPERKALEMIA: ICD-10-CM

## 2025-07-20 DIAGNOSIS — N30.00 ACUTE CYSTITIS WITHOUT HEMATURIA: ICD-10-CM

## 2025-07-20 DIAGNOSIS — E86.0 DEHYDRATION: ICD-10-CM

## 2025-07-20 DIAGNOSIS — I48.19 PERSISTENT ATRIAL FIBRILLATION (MULTI): ICD-10-CM

## 2025-07-20 DIAGNOSIS — R11.2 NAUSEA AND VOMITING, UNSPECIFIED VOMITING TYPE: ICD-10-CM

## 2025-07-20 DIAGNOSIS — E78.00 PURE HYPERCHOLESTEROLEMIA: ICD-10-CM

## 2025-07-20 DIAGNOSIS — I48.0 PAF (PAROXYSMAL ATRIAL FIBRILLATION) (MULTI): ICD-10-CM

## 2025-07-20 DIAGNOSIS — I10 HYPERTENSION, UNSPECIFIED TYPE: ICD-10-CM

## 2025-07-20 LAB
ALBUMIN SERPL BCP-MCNC: 4.9 G/DL (ref 3.4–5)
ALP SERPL-CCNC: 101 U/L (ref 33–136)
ALT SERPL W P-5'-P-CCNC: 13 U/L (ref 7–45)
ANION GAP SERPL CALC-SCNC: 19 MMOL/L (ref 10–20)
APPEARANCE UR: ABNORMAL
AST SERPL W P-5'-P-CCNC: 17 U/L (ref 9–39)
BACTERIA #/AREA URNS AUTO: ABNORMAL /HPF
BASOPHILS # BLD AUTO: 0.06 X10*3/UL (ref 0–0.1)
BASOPHILS NFR BLD AUTO: 0.4 %
BILIRUB SERPL-MCNC: 0.5 MG/DL (ref 0–1.2)
BILIRUB UR STRIP.AUTO-MCNC: NEGATIVE MG/DL
BUN SERPL-MCNC: 30 MG/DL (ref 6–23)
CALCIUM SERPL-MCNC: 10.5 MG/DL (ref 8.6–10.3)
CARDIAC TROPONIN I PNL SERPL HS: 13 NG/L (ref 0–13)
CHLORIDE SERPL-SCNC: 99 MMOL/L (ref 98–107)
CO2 SERPL-SCNC: 22 MMOL/L (ref 21–32)
COLOR UR: ABNORMAL
CREAT SERPL-MCNC: 2.09 MG/DL (ref 0.5–1.05)
EGFRCR SERPLBLD CKD-EPI 2021: 24 ML/MIN/1.73M*2
EOSINOPHIL # BLD AUTO: 1.52 X10*3/UL (ref 0–0.4)
EOSINOPHIL NFR BLD AUTO: 10.4 %
ERYTHROCYTE [DISTWIDTH] IN BLOOD BY AUTOMATED COUNT: 13.2 % (ref 11.5–14.5)
ERYTHROCYTE [DISTWIDTH] IN BLOOD BY AUTOMATED COUNT: 13.4 % (ref 11.5–14.5)
GLUCOSE BLD MANUAL STRIP-MCNC: 112 MG/DL (ref 74–99)
GLUCOSE BLD MANUAL STRIP-MCNC: 191 MG/DL (ref 74–99)
GLUCOSE SERPL-MCNC: 157 MG/DL (ref 74–99)
GLUCOSE UR STRIP.AUTO-MCNC: NORMAL MG/DL
HCT VFR BLD AUTO: 40.7 % (ref 36–46)
HCT VFR BLD AUTO: 48 % (ref 36–46)
HGB BLD-MCNC: 13.4 G/DL (ref 12–16)
HGB BLD-MCNC: 15.7 G/DL (ref 12–16)
HYALINE CASTS #/AREA URNS AUTO: ABNORMAL /LPF
IMM GRANULOCYTES # BLD AUTO: 0.08 X10*3/UL (ref 0–0.5)
IMM GRANULOCYTES NFR BLD AUTO: 0.5 % (ref 0–0.9)
KETONES UR STRIP.AUTO-MCNC: ABNORMAL MG/DL
LEUKOCYTE ESTERASE UR QL STRIP.AUTO: ABNORMAL
LIPASE SERPL-CCNC: 24 U/L (ref 9–82)
LYMPHOCYTES # BLD AUTO: 2 X10*3/UL (ref 0.8–3)
LYMPHOCYTES NFR BLD AUTO: 13.7 %
MAGNESIUM SERPL-MCNC: 1.54 MG/DL (ref 1.6–2.4)
MCH RBC QN AUTO: 28.2 PG (ref 26–34)
MCH RBC QN AUTO: 28.9 PG (ref 26–34)
MCHC RBC AUTO-ENTMCNC: 32.7 G/DL (ref 32–36)
MCHC RBC AUTO-ENTMCNC: 32.9 G/DL (ref 32–36)
MCV RBC AUTO: 86 FL (ref 80–100)
MCV RBC AUTO: 88 FL (ref 80–100)
MONOCYTES # BLD AUTO: 0.88 X10*3/UL (ref 0.05–0.8)
MONOCYTES NFR BLD AUTO: 6 %
MUCOUS THREADS #/AREA URNS AUTO: ABNORMAL /LPF
NEUTROPHILS # BLD AUTO: 10.07 X10*3/UL (ref 1.6–5.5)
NEUTROPHILS NFR BLD AUTO: 69 %
NITRITE UR QL STRIP.AUTO: ABNORMAL
NRBC BLD-RTO: 0 /100 WBCS (ref 0–0)
NRBC BLD-RTO: 0 /100 WBCS (ref 0–0)
PH UR STRIP.AUTO: 6 [PH]
PLATELET # BLD AUTO: 178 X10*3/UL (ref 150–450)
PLATELET # BLD AUTO: 245 X10*3/UL (ref 150–450)
POTASSIUM SERPL-SCNC: 5.9 MMOL/L (ref 3.5–5.3)
PROT SERPL-MCNC: 8.5 G/DL (ref 6.4–8.2)
PROT UR STRIP.AUTO-MCNC: NEGATIVE MG/DL
RBC # BLD AUTO: 4.64 X10*6/UL (ref 4–5.2)
RBC # BLD AUTO: 5.57 X10*6/UL (ref 4–5.2)
RBC # UR STRIP.AUTO: ABNORMAL MG/DL
RBC #/AREA URNS AUTO: ABNORMAL /HPF
SODIUM SERPL-SCNC: 134 MMOL/L (ref 136–145)
SP GR UR STRIP.AUTO: 1.02
SQUAMOUS #/AREA URNS AUTO: ABNORMAL /HPF
UROBILINOGEN UR STRIP.AUTO-MCNC: NORMAL MG/DL
WBC # BLD AUTO: 14.6 X10*3/UL (ref 4.4–11.3)
WBC # BLD AUTO: 17.6 X10*3/UL (ref 4.4–11.3)
WBC #/AREA URNS AUTO: >50 /HPF

## 2025-07-20 PROCEDURE — 2500000002 HC RX 250 W HCPCS SELF ADMINISTERED DRUGS (ALT 637 FOR MEDICARE OP, ALT 636 FOR OP/ED): Performed by: NURSE PRACTITIONER

## 2025-07-20 PROCEDURE — 71045 X-RAY EXAM CHEST 1 VIEW: CPT

## 2025-07-20 PROCEDURE — 74176 CT ABD & PELVIS W/O CONTRAST: CPT

## 2025-07-20 PROCEDURE — 81001 URINALYSIS AUTO W/SCOPE: CPT | Performed by: NURSE PRACTITIONER

## 2025-07-20 PROCEDURE — 82947 ASSAY GLUCOSE BLOOD QUANT: CPT

## 2025-07-20 PROCEDURE — 83735 ASSAY OF MAGNESIUM: CPT | Performed by: STUDENT IN AN ORGANIZED HEALTH CARE EDUCATION/TRAINING PROGRAM

## 2025-07-20 PROCEDURE — 85027 COMPLETE CBC AUTOMATED: CPT | Performed by: STUDENT IN AN ORGANIZED HEALTH CARE EDUCATION/TRAINING PROGRAM

## 2025-07-20 PROCEDURE — 96374 THER/PROPH/DIAG INJ IV PUSH: CPT

## 2025-07-20 PROCEDURE — 83690 ASSAY OF LIPASE: CPT | Performed by: NURSE PRACTITIONER

## 2025-07-20 PROCEDURE — 80048 BASIC METABOLIC PNL TOTAL CA: CPT | Mod: CCI | Performed by: STUDENT IN AN ORGANIZED HEALTH CARE EDUCATION/TRAINING PROGRAM

## 2025-07-20 PROCEDURE — 82374 ASSAY BLOOD CARBON DIOXIDE: CPT | Performed by: STUDENT IN AN ORGANIZED HEALTH CARE EDUCATION/TRAINING PROGRAM

## 2025-07-20 PROCEDURE — 96375 TX/PRO/DX INJ NEW DRUG ADDON: CPT

## 2025-07-20 PROCEDURE — 1200000002 HC GENERAL ROOM WITH TELEMETRY DAILY

## 2025-07-20 PROCEDURE — 74176 CT ABD & PELVIS W/O CONTRAST: CPT | Performed by: RADIOLOGY

## 2025-07-20 PROCEDURE — 96361 HYDRATE IV INFUSION ADD-ON: CPT

## 2025-07-20 PROCEDURE — 36415 COLL VENOUS BLD VENIPUNCTURE: CPT | Performed by: STUDENT IN AN ORGANIZED HEALTH CARE EDUCATION/TRAINING PROGRAM

## 2025-07-20 PROCEDURE — 71045 X-RAY EXAM CHEST 1 VIEW: CPT | Performed by: RADIOLOGY

## 2025-07-20 PROCEDURE — 85025 COMPLETE CBC W/AUTO DIFF WBC: CPT | Performed by: NURSE PRACTITIONER

## 2025-07-20 PROCEDURE — 99291 CRITICAL CARE FIRST HOUR: CPT | Performed by: EMERGENCY MEDICINE

## 2025-07-20 PROCEDURE — 36415 COLL VENOUS BLD VENIPUNCTURE: CPT | Performed by: NURSE PRACTITIONER

## 2025-07-20 PROCEDURE — 93005 ELECTROCARDIOGRAM TRACING: CPT

## 2025-07-20 PROCEDURE — 2500000005 HC RX 250 GENERAL PHARMACY W/O HCPCS: Performed by: NURSE PRACTITIONER

## 2025-07-20 PROCEDURE — 83735 ASSAY OF MAGNESIUM: CPT | Performed by: NURSE PRACTITIONER

## 2025-07-20 PROCEDURE — 84484 ASSAY OF TROPONIN QUANT: CPT | Performed by: NURSE PRACTITIONER

## 2025-07-20 PROCEDURE — 80053 COMPREHEN METABOLIC PANEL: CPT | Performed by: NURSE PRACTITIONER

## 2025-07-20 PROCEDURE — 87086 URINE CULTURE/COLONY COUNT: CPT | Mod: PARLAB | Performed by: NURSE PRACTITIONER

## 2025-07-20 PROCEDURE — 2500000001 HC RX 250 WO HCPCS SELF ADMINISTERED DRUGS (ALT 637 FOR MEDICARE OP): Performed by: NURSE PRACTITIONER

## 2025-07-20 PROCEDURE — 99223 1ST HOSP IP/OBS HIGH 75: CPT | Performed by: STUDENT IN AN ORGANIZED HEALTH CARE EDUCATION/TRAINING PROGRAM

## 2025-07-20 PROCEDURE — 2500000004 HC RX 250 GENERAL PHARMACY W/ HCPCS (ALT 636 FOR OP/ED): Performed by: NURSE PRACTITIONER

## 2025-07-20 RX ORDER — CEFTRIAXONE 1 G/50ML
1 INJECTION, SOLUTION INTRAVENOUS EVERY 24 HOURS
Status: DISCONTINUED | OUTPATIENT
Start: 2025-07-21 | End: 2025-07-23 | Stop reason: HOSPADM

## 2025-07-20 RX ORDER — VIT C/E/ZN/COPPR/LUTEIN/ZEAXAN 250MG-90MG
25 CAPSULE ORAL DAILY
COMMUNITY

## 2025-07-20 RX ORDER — SODIUM CHLORIDE 9 MG/ML
75 INJECTION, SOLUTION INTRAVENOUS CONTINUOUS
Status: DISCONTINUED | OUTPATIENT
Start: 2025-07-20 | End: 2025-07-20

## 2025-07-20 RX ORDER — ACETAMINOPHEN 325 MG/1
650 TABLET ORAL EVERY 4 HOURS PRN
Status: DISCONTINUED | OUTPATIENT
Start: 2025-07-20 | End: 2025-07-23 | Stop reason: HOSPADM

## 2025-07-20 RX ORDER — ONDANSETRON HYDROCHLORIDE 2 MG/ML
4 INJECTION, SOLUTION INTRAVENOUS ONCE
Status: COMPLETED | OUTPATIENT
Start: 2025-07-20 | End: 2025-07-20

## 2025-07-20 RX ORDER — SODIUM CHLORIDE 9 MG/ML
100 INJECTION, SOLUTION INTRAVENOUS CONTINUOUS
Status: ACTIVE | OUTPATIENT
Start: 2025-07-20 | End: 2025-07-22

## 2025-07-20 RX ORDER — ONDANSETRON HYDROCHLORIDE 2 MG/ML
4 INJECTION, SOLUTION INTRAVENOUS EVERY 6 HOURS PRN
Status: DISCONTINUED | OUTPATIENT
Start: 2025-07-20 | End: 2025-07-23 | Stop reason: HOSPADM

## 2025-07-20 RX ORDER — LANOLIN ALCOHOL/MO/W.PET/CERES
1000 CREAM (GRAM) TOPICAL DAILY
COMMUNITY

## 2025-07-20 RX ORDER — SODIUM BICARBONATE 1 MEQ/ML
50 SYRINGE (ML) INTRAVENOUS ONCE
Status: COMPLETED | OUTPATIENT
Start: 2025-07-20 | End: 2025-07-20

## 2025-07-20 RX ORDER — DEXTROSE 50 % IN WATER (D50W) INTRAVENOUS SYRINGE
25 ONCE
Status: COMPLETED | OUTPATIENT
Start: 2025-07-20 | End: 2025-07-20

## 2025-07-20 RX ORDER — SODIUM POLYSTYRENE SULFONATE 15 G/60ML
15 SUSPENSION ORAL; RECTAL ONCE
Status: COMPLETED | OUTPATIENT
Start: 2025-07-20 | End: 2025-07-20

## 2025-07-20 RX ADMIN — SODIUM CHLORIDE 75 ML/HR: 0.9 INJECTION, SOLUTION INTRAVENOUS at 12:27

## 2025-07-20 RX ADMIN — SODIUM CHLORIDE 75 ML/HR: 0.9 INJECTION, SOLUTION INTRAVENOUS at 20:26

## 2025-07-20 RX ADMIN — DEXTROSE MONOHYDRATE 25 G: 25 INJECTION, SOLUTION INTRAVENOUS at 14:14

## 2025-07-20 RX ADMIN — SODIUM BICARBONATE 50 MEQ: 84 INJECTION INTRAVENOUS at 15:33

## 2025-07-20 RX ADMIN — ONDANSETRON 4 MG: 2 INJECTION, SOLUTION INTRAMUSCULAR; INTRAVENOUS at 12:56

## 2025-07-20 RX ADMIN — SODIUM POLYSTYRENE SULFONATE 15 G: 15 SUSPENSION ORAL; RECTAL at 14:14

## 2025-07-20 RX ADMIN — INSULIN HUMAN 5 UNITS: 100 INJECTION, SOLUTION PARENTERAL at 14:14

## 2025-07-20 RX ADMIN — SODIUM CHLORIDE 1000 ML: 0.9 INJECTION, SOLUTION INTRAVENOUS at 12:27

## 2025-07-20 ASSESSMENT — LIFESTYLE VARIABLES
EVER FELT BAD OR GUILTY ABOUT YOUR DRINKING: NO
HAVE YOU EVER FELT YOU SHOULD CUT DOWN ON YOUR DRINKING: NO
TOTAL SCORE: 0
EVER HAD A DRINK FIRST THING IN THE MORNING TO STEADY YOUR NERVES TO GET RID OF A HANGOVER: NO
HAVE PEOPLE ANNOYED YOU BY CRITICIZING YOUR DRINKING: NO

## 2025-07-20 ASSESSMENT — PAIN SCALES - GENERAL
PAINLEVEL_OUTOF10: 8
PAINLEVEL_OUTOF10: 0 - NO PAIN

## 2025-07-20 NOTE — ED PROVIDER NOTES
Limitations to History: None     HPI:      Marissa Rebolledo is a 79 y.o. female with significant PMH for T2DM, CHF, A-fib on Eliquis, GERD, HLD, HTN and cardiomyopathy presenting to ED today from home for evaluation of nausea and vomiting.  1 month ago the patient was started on Trulicity, Thursday the Trulicity dose was increased.  Patient has become extremely weak with vomiting, feels shaky and states her emesis was dark in color.  Denies sick contacts.  Denies fever/chills, cough/cold symptoms, chest pain, shortness of breath, abdominal pain, urinary symptoms, bloody/black bowel movements or any other complaints.  No smoking, EtOH or IV drugs.  PCP is Dr. Contreras, cardiologist is Dr. Tucker.    Additional History Obtained from: Triage/nursing notes reviewed    ------------------------------------------------------------------------------------------------------------------------------------------    VS: As documented in the triage note and EMR flowsheet from this visit were reviewed.    Physical Exam:  Gen: 79-year-old female, appears extremely weak, oriented x 3.  Thin with some muscle wasting.  Lips dry.  Nontoxic.  Head/Neck: NCAT, neck w/ FROM  Eyes: EOMI, PERRL, anicteric sclerae, noninjected conjunctivae  Ears: TMs clear b/l without sign of infection  Nose: Nares patent w/o rhinorrhea  Mouth:   Mucous membranes dry.  no OP lesions noted  Heart: RRR no MRG  Lungs: CTA b/l no RRW, no increased work of breathing  Abdomen: Round and soft with bowel sounds, nontender.  No CVA tenderness.  Musculoskeletal: ISACC x 4.  MSPs intact.  Skin intact.  No deformities.  Neurologic: Alert, symmetrical facies, phonates clearly, moves all extremities equally, responsive to touch, ambulates normally   Skin: Pink, warm and dry.  No erythema, edema or ecchymosis.   No rashes noted  Psychological: calm, no  SI/HI      ------------------------------------------------------------------------------------------------------------------------------------------    Medical Decision Makin y.o. female with significant PMH for T2DM, CHF, A-fib on Eliquis, GERD, HLD, HTN and cardiomyopathy is evaluated at the bedside for weakness and nausea/vomiting times several days since increasing her Trulicity dose on Thursday.  On arrival tachycardic at 126, blood pressure 121/74, patient is not hypoxic or febrile.  Lungs clear, abdomen soft nontender.  Appears generally weak but nontoxic.    Differential includes is not limited to dehydration, bowel obstruction, electrolyte derangement.    IV established, continuous cardiac/O2 sat monitoring.  Basic labs, EKG, chest x-ray, CT abdomen/pelvis and UA/urine culture will be obtained.  1 L normal saline IV wide open with maintenance rate to follow.    ED Course as of 25   Sun 2025   1349 Laboratory studies were reviewed, mild leukocytosis with a left shift.  No evidence of anemia.  Potassium elevated at 5.9.  HELEN is new for the patient.  Mildly low magnesium 1.54.  Troponin and lipase are normal.  Chest x-ray no acute cardiopulmonary process.  Given hyperkalemia cocktail.  Insulin/D50 and Kayexalate.  Calcium gluconate not given as there are no EKG changes.  [SB]   182 CT abdomen pelvis shows no acute finding.  Chest x-ray shows no acute cardiopulmonary process.  With the patient's HELEN with hyperkalemia likely due to dehydration from vomiting, I feel she would be best served with admission for IV antibiotics and hydration with trending of labs.  Hospitalist paged to discuss the case. [SB]    Hemodynamically stable.  Heart rate improved after fluids.  Dr. Ascencio agrees to admit the patient to telemetry.  Diagnosis, treatment and plan for admission discussed with patient, she verbalizes understanding and is in agreement.  Condition fair. [SB]      ED Course User  Index  [SB] TAD Vásquez-CNP         Diagnoses as of 07/20/25 1908   Acute kidney injury   Nausea and vomiting, unspecified vomiting type   Hyperkalemia   Dehydration       EKG interpreted by Dr. Steven at 11:09 AM, sinus tachycardia rate of 126.  No ST segment depression or elevation consistent with ischemia or infarction.    Chronic Medical Conditions Significantly Affecting Care: None identified    External Records Reviewed: I reviewed recent and relevant outside records including: Not ordered    Discussion of Management with Other Providers: Seen and evaluated ED attending physician, Dr. Steven, she agrees with the treatment plan final disposition of the patient.    I discussed the patient/results with: Dr. SAPNA Ascencio of the hospitalist service.       CELIA Vásquez  07/20/25 1908

## 2025-07-20 NOTE — LETTER
July 22, 2025       To Whom It May Concern:    Please excuse from work the bearer of this letter who was present with his mother in the hospital while she underwent a medical procedure on 7/22/2025.         Sincerely,   Sameer Ascencio, DO

## 2025-07-20 NOTE — ED TRIAGE NOTES
Pt. Arrived to the ED with weakness and vomiting that started on Friday. Pt. Stated that they increase her dose of Trulicity on Thursday and the symptoms started the day after. Pt denies CP and SOB

## 2025-07-20 NOTE — PROGRESS NOTES
Pharmacy Medication History Review    Marissa Rebolledo is a 79 y.o. female admitted for No Principal Problem: There is no principal problem currently on the Problem List. Please update the Problem List and refresh.. Pharmacy reviewed the patient's [id number] medications and allergies for accuracy.    The list below reflectives the updated PTA list. Please review each medication in order reconciliation for additional clarification and justification.  Prior to Admission medications   Medication Sig Start Date End Date Taking? Authorizing Provider   albuterol 90 mcg/actuation inhaler Inhale 1-2 puffs every 6 hours if needed for wheezing. 12/15/23  Yes Chris Contreras DO   apixaban (Eliquis) 5 mg tablet Take 1 tablet (5 mg) by mouth 2 times a day. 6/5/25 12/2/25 Yes Chris Contreras DO   atorvastatin (Lipitor) 20 mg tablet Take 1 tablet (20 mg) by mouth once daily.  Patient taking differently: Take 1 tablet (20 mg) by mouth once daily at bedtime. 7/18/25 1/14/26 Yes Chris Contreras DO   blood-glucose meter,continuous (Dexcom G7 ) misc Use as instructed 1/2/25  Yes Chris Contreras DO   blood-glucose meter,continuous (FreeStyle Aneesh 3 [place]) misc Use as instructed 2/4/25  Yes Chris Contreras DO   blood-glucose sensor (Dexcom G7 Sensor) device Replace every 10 days 1/2/25  Yes Chris Contreras DO   blood-glucose sensor (FreeStyle Aneesh 3 Plus Sensor) device 1 each 2 times a day. Replace every 15 days 2/5/25  Yes Chris Contrersa DO   cholecalciferol (Vitamin D-3) 25 mcg (1,000 units) capsule Take 1 capsule (25 mcg) by mouth once daily.   Yes Historical Provider, MD   cyanocobalamin (Vitamin B-12) 1,000 mcg tablet Take 1 tablet (1,000 mcg) by mouth once daily.   Yes Historical Provider, MD   esomeprazole (NexIUM) 40 mg DR capsule Take 1 capsule (40 mg) by mouth once daily in the morning. Take before meals. 7/7/25 1/3/26 Yes Chris Contreras DO   glipiZIDE (Glucotrol) 5 mg tablet Take 2 tablets (10 mg)  by mouth 2 times a day before meals.  Patient taking differently: Take 0.5 tablets (2.5 mg) by mouth once daily with breakfast. 5/12/25 11/8/25 Yes Chris Contreras DO   levothyroxine (Synthroid, Levoxyl) 50 mcg tablet Take 1 tablet (50 mcg) by mouth once daily. 7/7/25 1/3/26 Yes Crhis Contreras DO   lisinopril 40 mg tablet TAKE 1 TABLET(40 MG) BY MOUTH EVERY DAY  Patient taking differently: Take 1 tablet (40 mg) by mouth once daily in the morning. 1/20/25  Yes Abhilash Vieira MD   metoprolol succinate XL (Toprol-XL) 25 mg 24 hr tablet Take 0.5 tablets (12.5 mg) by mouth 2 times a day. Do not crush or chew. 5/12/25 11/8/25 Yes Chris Contreras DO   spironolactone (Aldactone) 50 mg tablet Take 1 tablet (50 mg) by mouth once daily.  Patient taking differently: Take 1 tablet (50 mg) by mouth once daily in the morning. 5/12/25 11/8/25 Yes Chris Contreras DO   venlafaxine XR (Effexor-XR) 150 mg 24 hr capsule Take 1 capsule (150 mg) by mouth once daily at bedtime. 2/27/25 8/26/25 Yes Chris Contreras DO   venlafaxine XR (Effexor-XR) 75 mg 24 hr capsule Take 1 capsule (75 mg) by mouth once daily in the morning. Take with food.  Patient taking differently: Take 1 capsule (75 mg) by mouth once daily as needed. Take with food. 6/26/25 10/24/25 Yes Khoi Petty,    azelastine (Astelin) 137 mcg (0.1 %) nasal spray Administer 1 spray into each nostril 2 times a day.  Patient not taking: Reported on 7/20/2025 12/6/21  no Historical Provider, MD   dulaglutide (Trulicity) 1.5 mg/0.5 mL pen injector injection Inject 1.5 mg under the skin 1 (one) time per week.  Patient taking differently: Inject 1.5 mg under the skin 1 (one) time per week. Every Thursday 7/7/25  yes Chris Contreras,    insulin lispro (HumaLOG KwikPen Insulin) 100 unit/mL pen Inject 3 times daily using sliding scale: 0-150 no units, 151-200 2 units, 201-250 4 units, 251-300 6 units, 301-350 8 units, 351-400 10 units, above 400 call Dr. Contreras  Max 30  units daily  Patient taking differently: Inject 0-10 Units under the skin 3 times daily (morning, midday, late afternoon). Per sliding scale: 0-150 = 0 units; 151-200 2 units, 201-250 4 units, 251-300 6 units, 301-350 8 units, 351-400 10 units, above 400 call Dr. Contreras  Max 30 units daily 6/5/25  yes Chris Contreras DO   metFORMIN  mg 24 hr tablet Take 2 tablets (1,000 mg) by mouth 2 times daily (morning and late afternoon).  Patient not taking: Reported on 7/20/2025 7/19/24 1/15/25 no Elina Hart, APRN-CNP   atorvastatin (Lipitor) 20 mg tablet Take 1 tablet (20 mg) by mouth once daily. 12/27/24 7/18/25 yes Chris Contreras DO        The list below reflectives the updated allergy list. Please review each documented allergy for additional clarification and justification.  Allergies  Reviewed by Philomena Herring on 7/20/2025        Severity Reactions Comments    Colchicine Medium Other Did not feel well with it    Metformin Medium Headache, GI Upset Abdominal pain    Mirtazapine Medium Hives     Rosuvastatin Medium Myalgia Muscle spasm/weakness    Simvastatin Medium Myalgia Muscle spasms/weakness Leg cramps     Sulfamethoxazole-trimethoprim Medium Hives     Jardiance [empagliflozin] Not Specified Other     Alendronic Acid Low Other     Clarithromycin Low Other weak    Fosamax [alendronate] Low Other weak    Nitrofurantoin Monohyd/m-cryst Low Other weak    Ofloxacin Low Other weakness            Below are additional concerns with the patient's PTA list.      Christen Harris

## 2025-07-21 ENCOUNTER — HOSPITAL ENCOUNTER (OUTPATIENT)
Dept: CARDIOLOGY | Facility: HOSPITAL | Age: 80
Discharge: HOME | End: 2025-07-21
Payer: MEDICARE

## 2025-07-21 DIAGNOSIS — N17.9 AKI (ACUTE KIDNEY INJURY): ICD-10-CM

## 2025-07-21 LAB
ANION GAP SERPL CALC-SCNC: 13 MMOL/L (ref 10–20)
ANION GAP SERPL CALC-SCNC: 16 MMOL/L (ref 10–20)
ATRIAL RATE: 126 BPM
BASOPHILS # BLD AUTO: 0.08 X10*3/UL (ref 0–0.1)
BASOPHILS NFR BLD AUTO: 0.6 %
BUN SERPL-MCNC: 22 MG/DL (ref 6–23)
BUN SERPL-MCNC: 26 MG/DL (ref 6–23)
CALCIUM SERPL-MCNC: 8.5 MG/DL (ref 8.6–10.3)
CALCIUM SERPL-MCNC: 8.7 MG/DL (ref 8.6–10.3)
CHLORIDE SERPL-SCNC: 104 MMOL/L (ref 98–107)
CHLORIDE SERPL-SCNC: 105 MMOL/L (ref 98–107)
CO2 SERPL-SCNC: 20 MMOL/L (ref 21–32)
CO2 SERPL-SCNC: 24 MMOL/L (ref 21–32)
CREAT SERPL-MCNC: 1.5 MG/DL (ref 0.5–1.05)
CREAT SERPL-MCNC: 1.52 MG/DL (ref 0.5–1.05)
EGFRCR SERPLBLD CKD-EPI 2021: 35 ML/MIN/1.73M*2
EGFRCR SERPLBLD CKD-EPI 2021: 36 ML/MIN/1.73M*2
EOSINOPHIL # BLD AUTO: 2.85 X10*3/UL (ref 0–0.4)
EOSINOPHIL NFR BLD AUTO: 22.3 %
ERYTHROCYTE [DISTWIDTH] IN BLOOD BY AUTOMATED COUNT: 13.3 % (ref 11.5–14.5)
GLUCOSE BLD MANUAL STRIP-MCNC: 109 MG/DL (ref 74–99)
GLUCOSE BLD MANUAL STRIP-MCNC: 109 MG/DL (ref 74–99)
GLUCOSE BLD MANUAL STRIP-MCNC: 129 MG/DL (ref 74–99)
GLUCOSE BLD MANUAL STRIP-MCNC: 145 MG/DL (ref 74–99)
GLUCOSE BLD MANUAL STRIP-MCNC: 97 MG/DL (ref 74–99)
GLUCOSE SERPL-MCNC: 170 MG/DL (ref 74–99)
GLUCOSE SERPL-MCNC: 93 MG/DL (ref 74–99)
HCT VFR BLD AUTO: 39.5 % (ref 36–46)
HGB BLD-MCNC: 12.6 G/DL (ref 12–16)
HOLD SPECIMEN: NORMAL
IMM GRANULOCYTES # BLD AUTO: 0.06 X10*3/UL (ref 0–0.5)
IMM GRANULOCYTES NFR BLD AUTO: 0.5 % (ref 0–0.9)
LYMPHOCYTES # BLD AUTO: 2.2 X10*3/UL (ref 0.8–3)
LYMPHOCYTES NFR BLD AUTO: 17.2 %
MAGNESIUM SERPL-MCNC: 1.44 MG/DL (ref 1.6–2.4)
MAGNESIUM SERPL-MCNC: 1.92 MG/DL (ref 1.6–2.4)
MCH RBC QN AUTO: 28.4 PG (ref 26–34)
MCHC RBC AUTO-ENTMCNC: 31.9 G/DL (ref 32–36)
MCV RBC AUTO: 89 FL (ref 80–100)
MONOCYTES # BLD AUTO: 1.01 X10*3/UL (ref 0.05–0.8)
MONOCYTES NFR BLD AUTO: 7.9 %
NEUTROPHILS # BLD AUTO: 6.56 X10*3/UL (ref 1.6–5.5)
NEUTROPHILS NFR BLD AUTO: 51.5 %
NRBC BLD-RTO: 0 /100 WBCS (ref 0–0)
P AXIS: 85 DEGREES
P OFFSET: 199 MS
P ONSET: 145 MS
PLATELET # BLD AUTO: 184 X10*3/UL (ref 150–450)
POTASSIUM SERPL-SCNC: 4.6 MMOL/L (ref 3.5–5.3)
POTASSIUM SERPL-SCNC: 4.6 MMOL/L (ref 3.5–5.3)
PR INTERVAL: 158 MS
Q ONSET: 224 MS
QRS COUNT: 21 BEATS
QRS DURATION: 74 MS
QT INTERVAL: 292 MS
QTC CALCULATION(BAZETT): 422 MS
QTC FREDERICIA: 374 MS
R AXIS: 18 DEGREES
RBC # BLD AUTO: 4.43 X10*6/UL (ref 4–5.2)
SODIUM SERPL-SCNC: 136 MMOL/L (ref 136–145)
SODIUM SERPL-SCNC: 136 MMOL/L (ref 136–145)
T AXIS: 97 DEGREES
T OFFSET: 370 MS
TSH SERPL-ACNC: 1.53 MIU/L (ref 0.44–3.98)
VENTRICULAR RATE: 126 BPM
WBC # BLD AUTO: 12.8 X10*3/UL (ref 4.4–11.3)

## 2025-07-21 PROCEDURE — 1200000002 HC GENERAL ROOM WITH TELEMETRY DAILY

## 2025-07-21 PROCEDURE — 99291 CRITICAL CARE FIRST HOUR: CPT | Mod: 25 | Performed by: EMERGENCY MEDICINE

## 2025-07-21 PROCEDURE — 97165 OT EVAL LOW COMPLEX 30 MIN: CPT | Mod: GO

## 2025-07-21 PROCEDURE — 82947 ASSAY GLUCOSE BLOOD QUANT: CPT

## 2025-07-21 PROCEDURE — 36415 COLL VENOUS BLD VENIPUNCTURE: CPT | Performed by: STUDENT IN AN ORGANIZED HEALTH CARE EDUCATION/TRAINING PROGRAM

## 2025-07-21 PROCEDURE — 2500000004 HC RX 250 GENERAL PHARMACY W/ HCPCS (ALT 636 FOR OP/ED): Performed by: STUDENT IN AN ORGANIZED HEALTH CARE EDUCATION/TRAINING PROGRAM

## 2025-07-21 PROCEDURE — 2500000001 HC RX 250 WO HCPCS SELF ADMINISTERED DRUGS (ALT 637 FOR MEDICARE OP): Performed by: NURSE PRACTITIONER

## 2025-07-21 PROCEDURE — 85025 COMPLETE CBC W/AUTO DIFF WBC: CPT | Performed by: STUDENT IN AN ORGANIZED HEALTH CARE EDUCATION/TRAINING PROGRAM

## 2025-07-21 PROCEDURE — 99223 1ST HOSP IP/OBS HIGH 75: CPT | Performed by: INTERNAL MEDICINE

## 2025-07-21 PROCEDURE — 84443 ASSAY THYROID STIM HORMONE: CPT | Performed by: NURSE PRACTITIONER

## 2025-07-21 PROCEDURE — 93005 ELECTROCARDIOGRAM TRACING: CPT

## 2025-07-21 PROCEDURE — 99223 1ST HOSP IP/OBS HIGH 75: CPT | Performed by: NURSE PRACTITIONER

## 2025-07-21 PROCEDURE — 80048 BASIC METABOLIC PNL TOTAL CA: CPT | Performed by: NURSE PRACTITIONER

## 2025-07-21 PROCEDURE — 83735 ASSAY OF MAGNESIUM: CPT | Performed by: STUDENT IN AN ORGANIZED HEALTH CARE EDUCATION/TRAINING PROGRAM

## 2025-07-21 PROCEDURE — 2500000002 HC RX 250 W HCPCS SELF ADMINISTERED DRUGS (ALT 637 FOR MEDICARE OP, ALT 636 FOR OP/ED): Performed by: STUDENT IN AN ORGANIZED HEALTH CARE EDUCATION/TRAINING PROGRAM

## 2025-07-21 PROCEDURE — 2500000004 HC RX 250 GENERAL PHARMACY W/ HCPCS (ALT 636 FOR OP/ED): Performed by: INTERNAL MEDICINE

## 2025-07-21 PROCEDURE — 2500000001 HC RX 250 WO HCPCS SELF ADMINISTERED DRUGS (ALT 637 FOR MEDICARE OP): Performed by: INTERNAL MEDICINE

## 2025-07-21 PROCEDURE — 97161 PT EVAL LOW COMPLEX 20 MIN: CPT | Mod: GP

## 2025-07-21 PROCEDURE — 2500000001 HC RX 250 WO HCPCS SELF ADMINISTERED DRUGS (ALT 637 FOR MEDICARE OP): Performed by: STUDENT IN AN ORGANIZED HEALTH CARE EDUCATION/TRAINING PROGRAM

## 2025-07-21 PROCEDURE — 99233 SBSQ HOSP IP/OBS HIGH 50: CPT | Performed by: INTERNAL MEDICINE

## 2025-07-21 RX ORDER — METOPROLOL TARTRATE 25 MG/1
25 TABLET, FILM COATED ORAL 3 TIMES DAILY
Status: DISCONTINUED | OUTPATIENT
Start: 2025-07-21 | End: 2025-07-22

## 2025-07-21 RX ORDER — METOPROLOL SUCCINATE 25 MG/1
25 TABLET, EXTENDED RELEASE ORAL 2 TIMES DAILY
Status: DISCONTINUED | OUTPATIENT
Start: 2025-07-21 | End: 2025-07-21

## 2025-07-21 RX ORDER — MAGNESIUM CHLORIDE 64 MG
64 TABLET, DELAYED RELEASE (ENTERIC COATED) ORAL DAILY
Status: DISCONTINUED | OUTPATIENT
Start: 2025-07-21 | End: 2025-07-23 | Stop reason: HOSPADM

## 2025-07-21 RX ORDER — METOPROLOL TARTRATE 1 MG/ML
5 INJECTION, SOLUTION INTRAVENOUS EVERY 4 HOURS PRN
Status: DISCONTINUED | OUTPATIENT
Start: 2025-07-21 | End: 2025-07-23 | Stop reason: HOSPADM

## 2025-07-21 RX ORDER — DEXTROSE 50 % IN WATER (D50W) INTRAVENOUS SYRINGE
12.5
Status: DISCONTINUED | OUTPATIENT
Start: 2025-07-21 | End: 2025-07-23 | Stop reason: HOSPADM

## 2025-07-21 RX ORDER — VENLAFAXINE HYDROCHLORIDE 75 MG/1
150 CAPSULE, EXTENDED RELEASE ORAL NIGHTLY
Status: DISCONTINUED | OUTPATIENT
Start: 2025-07-21 | End: 2025-07-23 | Stop reason: HOSPADM

## 2025-07-21 RX ORDER — INSULIN LISPRO 100 [IU]/ML
0-10 INJECTION, SOLUTION INTRAVENOUS; SUBCUTANEOUS
Status: DISCONTINUED | OUTPATIENT
Start: 2025-07-21 | End: 2025-07-23 | Stop reason: HOSPADM

## 2025-07-21 RX ORDER — LEVOTHYROXINE SODIUM 50 UG/1
50 TABLET ORAL DAILY
Status: DISCONTINUED | OUTPATIENT
Start: 2025-07-21 | End: 2025-07-23 | Stop reason: HOSPADM

## 2025-07-21 RX ORDER — MAGNESIUM SULFATE HEPTAHYDRATE 40 MG/ML
2 INJECTION, SOLUTION INTRAVENOUS ONCE
Status: COMPLETED | OUTPATIENT
Start: 2025-07-21 | End: 2025-07-21

## 2025-07-21 RX ORDER — LANOLIN ALCOHOL/MO/W.PET/CERES
1000 CREAM (GRAM) TOPICAL DAILY
Status: DISCONTINUED | OUTPATIENT
Start: 2025-07-21 | End: 2025-07-23 | Stop reason: HOSPADM

## 2025-07-21 RX ORDER — ATORVASTATIN CALCIUM 20 MG/1
20 TABLET, FILM COATED ORAL NIGHTLY
Status: DISCONTINUED | OUTPATIENT
Start: 2025-07-21 | End: 2025-07-23 | Stop reason: HOSPADM

## 2025-07-21 RX ORDER — SODIUM CHLORIDE 9 MG/ML
100 INJECTION, SOLUTION INTRAVENOUS CONTINUOUS
Status: ACTIVE | OUTPATIENT
Start: 2025-07-21 | End: 2025-07-22

## 2025-07-21 RX ORDER — DEXTROSE 50 % IN WATER (D50W) INTRAVENOUS SYRINGE
25
Status: DISCONTINUED | OUTPATIENT
Start: 2025-07-21 | End: 2025-07-23 | Stop reason: HOSPADM

## 2025-07-21 RX ORDER — METOPROLOL TARTRATE 1 MG/ML
5 INJECTION, SOLUTION INTRAVENOUS ONCE
Status: COMPLETED | OUTPATIENT
Start: 2025-07-21 | End: 2025-07-21

## 2025-07-21 RX ORDER — PANTOPRAZOLE SODIUM 40 MG/1
40 TABLET, DELAYED RELEASE ORAL
Status: DISCONTINUED | OUTPATIENT
Start: 2025-07-21 | End: 2025-07-23 | Stop reason: HOSPADM

## 2025-07-21 RX ORDER — METOPROLOL SUCCINATE 25 MG/1
12.5 TABLET, EXTENDED RELEASE ORAL 2 TIMES DAILY
Status: DISCONTINUED | OUTPATIENT
Start: 2025-07-21 | End: 2025-07-21

## 2025-07-21 RX ADMIN — ONDANSETRON 4 MG: 2 INJECTION INTRAMUSCULAR; INTRAVENOUS at 02:47

## 2025-07-21 RX ADMIN — CYANOCOBALAMIN TAB 1000 MCG 1000 MCG: 1000 TAB at 08:23

## 2025-07-21 RX ADMIN — SODIUM CHLORIDE 100 ML/HR: 0.9 INJECTION, SOLUTION INTRAVENOUS at 07:56

## 2025-07-21 RX ADMIN — CEFTRIAXONE 1 G: 1 INJECTION, SOLUTION INTRAVENOUS at 00:23

## 2025-07-21 RX ADMIN — ATORVASTATIN CALCIUM 20 MG: 40 TABLET, FILM COATED ORAL at 20:24

## 2025-07-21 RX ADMIN — ONDANSETRON 4 MG: 2 INJECTION INTRAMUSCULAR; INTRAVENOUS at 18:04

## 2025-07-21 RX ADMIN — MAGNESIUM SULFATE IN WATER 2 G: 40 INJECTION, SOLUTION INTRAVENOUS at 00:58

## 2025-07-21 RX ADMIN — METOPROLOL TARTRATE 25 MG: 25 TABLET, FILM COATED ORAL at 15:59

## 2025-07-21 RX ADMIN — METOPROLOL TARTRATE 25 MG: 25 TABLET, FILM COATED ORAL at 20:26

## 2025-07-21 RX ADMIN — SODIUM CHLORIDE 100 ML/HR: 0.9 INJECTION, SOLUTION INTRAVENOUS at 18:04

## 2025-07-21 RX ADMIN — VENLAFAXINE HYDROCHLORIDE 150 MG: 75 CAPSULE, EXTENDED RELEASE ORAL at 00:57

## 2025-07-21 RX ADMIN — LEVOTHYROXINE SODIUM 50 MCG: 0.05 TABLET ORAL at 06:03

## 2025-07-21 RX ADMIN — PANTOPRAZOLE SODIUM 40 MG: 40 TABLET, DELAYED RELEASE ORAL at 06:03

## 2025-07-21 RX ADMIN — APIXABAN 5 MG: 5 TABLET, FILM COATED ORAL at 20:26

## 2025-07-21 RX ADMIN — VENLAFAXINE HYDROCHLORIDE 150 MG: 75 CAPSULE, EXTENDED RELEASE ORAL at 20:26

## 2025-07-21 RX ADMIN — METOPROLOL SUCCINATE 25 MG: 25 TABLET, EXTENDED RELEASE ORAL at 08:23

## 2025-07-21 RX ADMIN — APIXABAN 5 MG: 5 TABLET, FILM COATED ORAL at 08:23

## 2025-07-21 RX ADMIN — ONDANSETRON 4 MG: 2 INJECTION INTRAMUSCULAR; INTRAVENOUS at 08:51

## 2025-07-21 RX ADMIN — ATORVASTATIN CALCIUM 20 MG: 40 TABLET, FILM COATED ORAL at 00:57

## 2025-07-21 RX ADMIN — APIXABAN 5 MG: 5 TABLET, FILM COATED ORAL at 00:56

## 2025-07-21 RX ADMIN — MAGNESIUM 64 MG (MAGNESIUM CHLORIDE) TABLET,DELAYED RELEASE 1 TABLET: at 10:38

## 2025-07-21 RX ADMIN — METOPROLOL TARTRATE 5 MG: 1 INJECTION, SOLUTION INTRAVENOUS at 00:14

## 2025-07-21 RX ADMIN — METOPROLOL TARTRATE 5 MG: 1 INJECTION, SOLUTION INTRAVENOUS at 01:28

## 2025-07-21 RX ADMIN — PANTOPRAZOLE SODIUM 40 MG: 40 TABLET, DELAYED RELEASE ORAL at 15:59

## 2025-07-21 RX ADMIN — METOPROLOL SUCCINATE 25 MG: 25 TABLET, EXTENDED RELEASE ORAL at 00:56

## 2025-07-21 SDOH — ECONOMIC STABILITY: HOUSING INSECURITY: AT ANY TIME IN THE PAST 12 MONTHS, WERE YOU HOMELESS OR LIVING IN A SHELTER (INCLUDING NOW)?: NO

## 2025-07-21 SDOH — ECONOMIC STABILITY: HOUSING INSECURITY: IN THE LAST 12 MONTHS, WAS THERE A TIME WHEN YOU WERE NOT ABLE TO PAY THE MORTGAGE OR RENT ON TIME?: NO

## 2025-07-21 SDOH — SOCIAL STABILITY: SOCIAL INSECURITY: HAS ANYONE EVER THREATENED TO HURT YOUR FAMILY OR YOUR PETS?: NO

## 2025-07-21 SDOH — ECONOMIC STABILITY: FOOD INSECURITY: WITHIN THE PAST 12 MONTHS, YOU WORRIED THAT YOUR FOOD WOULD RUN OUT BEFORE YOU GOT THE MONEY TO BUY MORE.: NEVER TRUE

## 2025-07-21 SDOH — SOCIAL STABILITY: SOCIAL INSECURITY: WITHIN THE LAST YEAR, HAVE YOU BEEN AFRAID OF YOUR PARTNER OR EX-PARTNER?: NO

## 2025-07-21 SDOH — HEALTH STABILITY: MENTAL HEALTH: HOW OFTEN DO YOU HAVE SIX OR MORE DRINKS ON ONE OCCASION?: NEVER

## 2025-07-21 SDOH — ECONOMIC STABILITY: FOOD INSECURITY: WITHIN THE PAST 12 MONTHS, THE FOOD YOU BOUGHT JUST DIDN'T LAST AND YOU DIDN'T HAVE MONEY TO GET MORE.: NEVER TRUE

## 2025-07-21 SDOH — SOCIAL STABILITY: SOCIAL INSECURITY: DO YOU FEEL UNSAFE GOING BACK TO THE PLACE WHERE YOU ARE LIVING?: NO

## 2025-07-21 SDOH — ECONOMIC STABILITY: TRANSPORTATION INSECURITY: IN THE PAST 12 MONTHS, HAS LACK OF TRANSPORTATION KEPT YOU FROM MEDICAL APPOINTMENTS OR FROM GETTING MEDICATIONS?: NO

## 2025-07-21 SDOH — ECONOMIC STABILITY: FOOD INSECURITY: HOW HARD IS IT FOR YOU TO PAY FOR THE VERY BASICS LIKE FOOD, HOUSING, MEDICAL CARE, AND HEATING?: NOT VERY HARD

## 2025-07-21 SDOH — SOCIAL STABILITY: SOCIAL INSECURITY: ABUSE: ADULT

## 2025-07-21 SDOH — SOCIAL STABILITY: SOCIAL INSECURITY: WITHIN THE LAST YEAR, HAVE YOU BEEN HUMILIATED OR EMOTIONALLY ABUSED IN OTHER WAYS BY YOUR PARTNER OR EX-PARTNER?: NO

## 2025-07-21 SDOH — HEALTH STABILITY: MENTAL HEALTH: HOW MANY DRINKS CONTAINING ALCOHOL DO YOU HAVE ON A TYPICAL DAY WHEN YOU ARE DRINKING?: PATIENT DOES NOT DRINK

## 2025-07-21 SDOH — ECONOMIC STABILITY: HOUSING INSECURITY: IN THE PAST 12 MONTHS, HOW MANY TIMES HAVE YOU MOVED WHERE YOU WERE LIVING?: 0

## 2025-07-21 SDOH — ECONOMIC STABILITY: INCOME INSECURITY: IN THE PAST 12 MONTHS HAS THE ELECTRIC, GAS, OIL, OR WATER COMPANY THREATENED TO SHUT OFF SERVICES IN YOUR HOME?: NO

## 2025-07-21 SDOH — HEALTH STABILITY: MENTAL HEALTH: HOW OFTEN DO YOU HAVE A DRINK CONTAINING ALCOHOL?: NEVER

## 2025-07-21 SDOH — SOCIAL STABILITY: SOCIAL INSECURITY: DOES ANYONE TRY TO KEEP YOU FROM HAVING/CONTACTING OTHER FRIENDS OR DOING THINGS OUTSIDE YOUR HOME?: NO

## 2025-07-21 SDOH — SOCIAL STABILITY: SOCIAL INSECURITY: HAVE YOU HAD THOUGHTS OF HARMING ANYONE ELSE?: NO

## 2025-07-21 SDOH — SOCIAL STABILITY: SOCIAL INSECURITY: ARE YOU OR HAVE YOU BEEN THREATENED OR ABUSED PHYSICALLY, EMOTIONALLY, OR SEXUALLY BY ANYONE?: NO

## 2025-07-21 SDOH — SOCIAL STABILITY: SOCIAL INSECURITY: HAVE YOU HAD ANY THOUGHTS OF HARMING ANYONE ELSE?: NO

## 2025-07-21 SDOH — SOCIAL STABILITY: SOCIAL INSECURITY: DO YOU FEEL ANYONE HAS EXPLOITED OR TAKEN ADVANTAGE OF YOU FINANCIALLY OR OF YOUR PERSONAL PROPERTY?: NO

## 2025-07-21 SDOH — SOCIAL STABILITY: SOCIAL INSECURITY: WERE YOU ABLE TO COMPLETE ALL THE BEHAVIORAL HEALTH SCREENINGS?: YES

## 2025-07-21 SDOH — SOCIAL STABILITY: SOCIAL INSECURITY: ARE THERE ANY APPARENT SIGNS OF INJURIES/BEHAVIORS THAT COULD BE RELATED TO ABUSE/NEGLECT?: NO

## 2025-07-21 ASSESSMENT — ENCOUNTER SYMPTOMS
PALPITATIONS: 0
IRREGULAR HEARTBEAT: 0
VOMITING: 1
LIGHT-HEADEDNESS: 0
NEAR-SYNCOPE: 0
DYSPNEA ON EXERTION: 0
DYSURIA: 1
SHORTNESS OF BREATH: 0
PND: 0
DIZZINESS: 0
ORTHOPNEA: 0
NAUSEA: 1

## 2025-07-21 ASSESSMENT — PAIN SCALES - GENERAL
PAINLEVEL_OUTOF10: 0 - NO PAIN
PAINLEVEL_OUTOF10: 4
PAINLEVEL_OUTOF10: 0 - NO PAIN

## 2025-07-21 ASSESSMENT — PAIN - FUNCTIONAL ASSESSMENT
PAIN_FUNCTIONAL_ASSESSMENT: 0-10

## 2025-07-21 ASSESSMENT — COGNITIVE AND FUNCTIONAL STATUS - GENERAL
WALKING IN HOSPITAL ROOM: A LITTLE
CLIMB 3 TO 5 STEPS WITH RAILING: A LITTLE
DAILY ACTIVITIY SCORE: 24
DAILY ACTIVITIY SCORE: 24
PATIENT BASELINE BEDBOUND: NO
CLIMB 3 TO 5 STEPS WITH RAILING: A LITTLE
MOBILITY SCORE: 22
MOBILITY SCORE: 22
WALKING IN HOSPITAL ROOM: A LITTLE
DRESSING REGULAR LOWER BODY CLOTHING: A LITTLE
TOILETING: A LITTLE
MOBILITY SCORE: 22
PERSONAL GROOMING: A LITTLE
WALKING IN HOSPITAL ROOM: A LITTLE
CLIMB 3 TO 5 STEPS WITH RAILING: A LITTLE
DAILY ACTIVITIY SCORE: 24
CLIMB 3 TO 5 STEPS WITH RAILING: A LITTLE
HELP NEEDED FOR BATHING: A LITTLE
MOBILITY SCORE: 22
WALKING IN HOSPITAL ROOM: A LITTLE
DAILY ACTIVITIY SCORE: 20

## 2025-07-21 ASSESSMENT — ACTIVITIES OF DAILY LIVING (ADL)
DRESSING YOURSELF: INDEPENDENT
BATHING: INDEPENDENT
LACK_OF_TRANSPORTATION: NO
JUDGMENT_ADEQUATE_SAFELY_COMPLETE_DAILY_ACTIVITIES: YES
HEARING - LEFT EAR: FUNCTIONAL
TOILETING: INDEPENDENT
ADEQUATE_TO_COMPLETE_ADL: YES
ASSISTIVE_DEVICE: EYEGLASSES;DENTURES UPPER
GROOMING: INDEPENDENT
ADL_ASSISTANCE: INDEPENDENT
HEARING - RIGHT EAR: FUNCTIONAL
FEEDING YOURSELF: INDEPENDENT
WALKS IN HOME: INDEPENDENT
ADL_ASSISTANCE: INDEPENDENT
PATIENT'S MEMORY ADEQUATE TO SAFELY COMPLETE DAILY ACTIVITIES?: YES

## 2025-07-21 ASSESSMENT — PATIENT HEALTH QUESTIONNAIRE - PHQ9
2. FEELING DOWN, DEPRESSED OR HOPELESS: NOT AT ALL
SUM OF ALL RESPONSES TO PHQ9 QUESTIONS 1 & 2: 0
1. LITTLE INTEREST OR PLEASURE IN DOING THINGS: NOT AT ALL

## 2025-07-21 ASSESSMENT — LIFESTYLE VARIABLES
AUDIT-C TOTAL SCORE: 0
SKIP TO QUESTIONS 9-10: 1

## 2025-07-21 NOTE — PROGRESS NOTES
Patient Name: Marissa Rebolledo   YOB: 1945    Subjective:  Pt states she is feeling better today, able to get up and ambulate without any problems.  Denies any chest discomfort or palpitations.     Objective:    Vitals:    07/21/25 0152 07/21/25 0300 07/21/25 0759 07/21/25 0822   BP: 106/70 135/72 90/68 110/60   BP Location: Left arm Right arm Right arm Left arm   Patient Position: Lying Lying Lying Lying   Pulse: (!) 122 103 108 96   Resp: 16 16 16    Temp: 36.3 °C (97.3 °F) 36 °C (96.8 °F) 35.8 °C (96.4 °F)    TempSrc: Temporal Temporal Temporal    SpO2: 94% 95% 98%    Weight:       Height:           Physical Exam:    GEN: Awake and alert. Not in acute distress.   HEENT: Normocephalic and atraumatic.  Mucous membranes moist.  Clear sclera, PERRL, EOMI.  CARDIO: Irregularly irregular, tachycardic   RESP: Clear to auscultation b/l. No wheezes, rales or rhonchi. Good respiratory effort.   ABD: +BS x4, soft, non-tender. Not distended. No rebound or guarding.  MSK: Grossly normal inspection. No joint swelling or obvious deformities. ROM intact  NEURO: A&O X 3. CN II-XII are grossly intact. No focal deficits.   SKIN: Warm and dry, no lesions, no rashes.  PSYCH: Appropriate mood and affect, no hallucinations.     Scheduled medications  Scheduled Medications[1]  Continuous medications  Continuous Medications[2]  PRN medications  PRN Medications[3]     Assessment and Plan:  Marissa Rebolledo is a 79 y.o. female   Assessment & Plan  Acute kidney injury    # Nausea and vomiting 2/2 GLP-1  # PAF with RVR, atrial flutter s/p RFA ablation on Eliquis  # HELEN Cr 2.09, baseline 1.0  # Acute cystitis with hematuria  # Hyperkalemia  # Hypomagnesemia  HTN, HLD  DM II  Hypothyroidism  GERD  Osteoporosis    Patient feeling better, advance to regular diet.  She is ambulating.  Does not complain of any symptoms from her atrial fibrillation.  Continue metoprolol and attempt to  titrate up if able.  Her blood pressures are borderline low, she denies any dizziness or lightheadedness.  Continue IVF hydration and replace magnesium.  At this time, I would recommend continuing IVF hydration and see how her heart rate responds to IVFs and restarting beta blocker.  Patient was not able to tolerate any oral intake whatsoever when she was feeling ill.  If tachycardia persists, cardiology can consider digoxin or other meds.  Ceftriaxone for UTI.  Repeat AM blood work.  AC with Eliquis.    I spent 30 minutes in the professional and overall care of this patient. More than 50% of this time was spent examining and counseling patient, reviewing plan of care, and coordinating medical care.    Hernandez Azar DO  Senior Attending Physician  [facility]  Clinical Instructor             [1] apixaban, 5 mg, oral, BID  atorvastatin, 20 mg, oral, Nightly  cefTRIAXone, 1 g, intravenous, q24h  cyanocobalamin, 1,000 mcg, oral, Daily  insulin lispro, 0-10 Units, subcutaneous, Before meals & nightly  levothyroxine, 50 mcg, oral, Daily  magnesium chloride, 64 mg of elemental magnesium, oral, Daily  metoprolol succinate XL, 25 mg, oral, BID  pantoprazole, 40 mg, oral, BID AC  venlafaxine XR, 150 mg, oral, Nightly    [2] sodium chloride 0.9%, 100 mL/hr, Last Rate: 100 mL/hr (07/21/25 0903)    [3] PRN medications: acetaminophen, dextrose, dextrose, glucagon, glucagon, metoprolol, ondansetron

## 2025-07-21 NOTE — ED PROCEDURE NOTE
Procedure  Critical Care    Performed by: Alpa Steven MD  Authorized by: Alpa Steven MD    Critical care provider statement:     Critical care time (minutes):  37    Critical care time was exclusive of:  Separately billable procedures and treating other patients    Critical care was necessary to treat or prevent imminent or life-threatening deterioration of the following conditions:  Metabolic crisis    Critical care was time spent personally by me on the following activities:  Ordering and performing treatments and interventions, ordering and review of laboratory studies, ordering and review of radiographic studies, pulse oximetry, examination of patient and evaluation of patient's response to treatment    Care discussed with: admitting provider                 Alpa Steven MD  07/21/25 2251

## 2025-07-21 NOTE — CONSULTS
Consults      Reason for consultation:    Atrial fibrillation    History Of Present Illness:      This is a 79-year-old female with recurrent atrial fibrillation.  She was seen today in conjunction with CELIA Ugarte.  Please refer to her dictated consultation report for complete medical details.    Review of Systems    Other review of systems negative    Social History:  She reports that she has quit smoking. Her smoking use included cigarettes. She has been exposed to tobacco smoke. She has never used smokeless tobacco. She reports that she does not currently use alcohol. She reports that she does not currently use drugs.    Family History:  Family History[1]     Allergies:  Colchicine, Metformin, Mirtazapine, Rosuvastatin, Simvastatin, Sulfamethoxazole-trimethoprim, Jardiance [empagliflozin], Alendronic acid, Clarithromycin, Fosamax [alendronate], Nitrofurantoin monohyd/m-cryst, and Ofloxacin    Medications:  Current Medications[2]      Last Recorded Vitals:  Vitals:    07/21/25 0759 07/21/25 0822 07/21/25 1122 07/21/25 1548   BP: 90/68 110/60 124/81 111/68   BP Location: Right arm Left arm Left arm Left arm   Patient Position: Lying Lying Lying Lying   Pulse: 108 96 99 81   Resp: 16  16 16   Temp: 35.8 °C (96.4 °F)  35.8 °C (96.4 °F) 35.8 °C (96.4 °F)   TempSrc: Temporal  Temporal    SpO2: 98%  96% 96%   Weight:       Height:           Physical Exam:    General Appearance:  Alert, oriented, no distress  Skin:  Warm and dry  Head and Neck:  No elevation of JVP, no carotid bruits  Cardiac Exam:  Rhythm is regular, S1 and S2 are normal, no murmur S3 or S4  Lungs:  Clear to auscultation  Extremities:  no edema  Neurologic:  No focal deficits  Psychiatric:  Appropriate mood and behavior    Laboratory data:    CMP:  Recent Labs     07/21/25  0929 07/20/25  2353 07/20/25  1227 05/12/25  1120 01/06/25  1025 10/28/24  1323 06/27/24  2048 06/27/24  1635 06/24/24  1222 01/16/23  1412 11/05/22  0609    136  134* 137 141 141  --  136 139   < > 140   K 4.6 4.6 5.9* 5.6* 4.8 4.9 4.3 5.7* 4.6   < > 3.9    105 99 101 103 103  --  102 102   < > 104   CO2 24 20* 22 27 30 28  --  26 28   < > 26   ANIONGAP 13 16 19 9 13 15  --  14 14   < > 14   BUN 22 26* 30* 15 14 13  --  12 11   < > 15   CREATININE 1.52* 1.50* 2.09* 1.10* 1.02 1.07*  --  0.91 1.04   < > 0.90   EGFR 35* 36* 24* 51* 56* 54*  --  65 55*   < >  --    MG 1.92 1.44* 1.54*  --   --   --   --  1.71  --   --  1.83    < > = values in this interval not displayed.     Recent Labs     07/20/25  1227 05/12/25  1120 01/06/25  1025 06/27/24  1635 06/24/24  1222   ALBUMIN 4.9 4.6 4.0 4.3 4.1   ALKPHOS 101 76 80 68 69   ALT 13 15 28 11 12   AST 17 22 25 37 16   BILITOT 0.5 0.4 0.4 0.4 0.7   LIPASE 24  --   --   --   --      CBC:  Recent Labs     07/21/25  0929 07/20/25  2353 07/20/25  1227 05/12/25  1120 01/06/25  1025 10/28/24  1323 06/27/24  1635 06/24/24  1222   WBC 12.8* 17.6* 14.6* 8.3 7.1 8.4 8.3 7.7   HGB 12.6 13.4 15.7 13.3 12.9 13.2 12.7 12.2   HCT 39.5 40.7 48.0* 41.0 40.3 43.4 40.0 38.5    178 245 234 198 246 189 209   MCV 89 88 86 88.0 86 83 84 83     COAG:   Recent Labs     10/28/24  1323 06/27/24  1635 02/15/23  0627   INR 1.7* 1.5* 1.3*     HEME/ENDO:  Recent Labs     07/21/25  0929 05/12/25  1120 01/06/25  1025 06/27/24  1635 06/24/24  1222 01/31/24  1305 02/11/21  1432 06/11/20  1358 03/18/20  1205 10/17/19  1242 02/14/19  1400 09/04/18  1443 03/01/18  1633   FERRITIN  --   --   --   --   --   --   --   --  255*  --  137 219* 228*   IRONSAT  --   --   --   --   --   --   --  40 NOT CALC.  --   --   --   --    TSH 1.53 1.47 2.48 0.78 0.82  --    < >  --   --   --   --  2.25  --    HGBA1C  --  8.1* 7.9*  --  8.1* 8.5*   < >  --  7.2   < > 7.0  --   --     < > = values in this interval not displayed.      CARDIAC:   Recent Labs     07/20/25  1227 06/27/24  1636 06/27/24  1635 11/04/22  1805 11/04/22  1218 11/03/22  1817 11/03/22  1437   TROPHS 13  --   7 18* 15* 17* 14*   BNP  --  321*  --   --   --   --  719*     Recent Labs     01/06/25  1025 06/24/24  1222 01/31/24  1305 05/01/23  1136 02/11/21  1432 10/17/19  1242   CHOL 144 122 148 150 144 144   LDLF  --   --   --  77 73 75   HDL 44.6 32.6 39.2 49.4 37.0* 37.1*   TRIG 132 213* 224* 117 168* 162*       Test Review:  I have personally review the diagnostic testing:    Telemetry: Atrial fibrillation with RVR    Assessment/Plan:    1.  Atrial fibrillation: The patient has recurrent atrial fibrillation with RVR and had catheter ablation in November 2024.  She is symptomatic while in atrial fibrillation, with complaints of fatigue and shortness of breath.  Rate control has been difficult on telemetry and she is still tachycardic.  The metoprolol dosage will be increased for rate control tonight.  A cardioversion to restore sinus rhythm is recommended.  However, given the interruption in anticoagulation, I feel it would be safest to proceed with a KIET guided cardioversion.  The procedure and risks were discussed with the patient and her daughter.  She is in agreement with the recommendation and this will be scheduled for tomorrow.      James C Ramicone, DO           [1]   Family History  Problem Relation Name Age of Onset    Heart disease Mother      Hypertension Mother      Cancer Father      Heart disease Father     [2]   Current Facility-Administered Medications   Medication Dose Route Frequency Provider Last Rate Last Admin    acetaminophen (Tylenol) tablet 650 mg  650 mg oral q4h PRN Dm S Ascencio, DO        apixaban (Eliquis) tablet 5 mg  5 mg oral BID Dm S Ascencio, DO   5 mg at 07/21/25 0823    atorvastatin (Lipitor) tablet 20 mg  20 mg oral Nightly Dm S Ascencio, DO   20 mg at 07/21/25 0057    cefTRIAXone (Rocephin) 1 g in dextrose (iso) IV 50 mL  1 g intravenous q24h Dm S Ascencio, DO   Stopped at 07/21/25 0054    cyanocobalamin (Vitamin B-12) tablet 1,000 mcg  1,000 mcg oral Daily Dm S Ascencio, DO   1,000  mcg at 07/21/25 0823    dextrose 50 % injection 12.5 g  12.5 g intravenous q15 min PRN Dm S Ascencio, DO        dextrose 50 % injection 25 g  25 g intravenous q15 min PRN Dm S Ascencio, DO        glucagon (Glucagen) injection 1 mg  1 mg intramuscular q15 min PRN Dm S Ascencio, DO        glucagon (Glucagen) injection 1 mg  1 mg intramuscular q15 min PRN Dm S Ascencio, DO        insulin lispro injection 0-10 Units  0-10 Units subcutaneous Before meals & nightly Dm S Ascencio, DO        levothyroxine (Synthroid, Levoxyl) tablet 50 mcg  50 mcg oral Daily Dm S Ascencio, DO   50 mcg at 07/21/25 0603    magnesium chloride (MagDelay) 535 mg (64 mg elemental) EC tablet 1 tablet  64 mg of elemental magnesium oral Daily Hernandez A Doe, DO   1 tablet at 07/21/25 1038    metoprolol tartrate (Lopressor) injection 5 mg  5 mg intravenous q4h PRN Dm S Ascencio, DO        metoprolol tartrate (Lopressor) tablet 25 mg  25 mg oral TID Akila Denney, APRN-CNP   25 mg at 07/21/25 1559    ondansetron (Zofran) injection 4 mg  4 mg intravenous q6h PRN Dm S Ascencio, DO   4 mg at 07/21/25 0851    pantoprazole (ProtoNix) EC tablet 40 mg  40 mg oral BID AC Dm S Ascencio, DO   40 mg at 07/21/25 1559    sodium chloride 0.9% infusion  100 mL/hr intravenous Continuous Dm S Ascencio,  mL/hr at 07/21/25 1446 100 mL/hr at 07/21/25 1446    sodium chloride 0.9% infusion  100 mL/hr intravenous Continuous Hernandez A Doe,  mL/hr at 07/21/25 1446 100 mL/hr at 07/21/25 1446    venlafaxine XR (Effexor-XR) 24 hr capsule 150 mg  150 mg oral Nightly Dm S Ascencio, DO   150 mg at 07/21/25 0057

## 2025-07-21 NOTE — CARE PLAN
The patient's goals for the shift include      The clinical goals for the shift include k>4, mag>2 & for pt to remain HDS/safe throughout the shift      Problem: Safety - Adult  Goal: Free from fall injury  Outcome: Progressing     Problem: Pain - Adult  Goal: Verbalizes/displays adequate comfort level or baseline comfort level  Outcome: Met

## 2025-07-21 NOTE — H&P
History Of Present Illness  80 yo F with PMHx PAF, Atrial flutter s/p RFA on Eliquis, HTN, HLD, DM II, hypothyroidism, osteoporosis, and GERD presents with persistent n/v, increased reflux, and poor PO intake since this past Friday. She was started on Trulicity once weekly injections one month ago and her dose she injected on Thursday was an increased dose and the following day she began having GI symptoms. In addition, she has had increased dysuria over the past several days. She denies abdominal pain, fever, chills, cough, CP, and sob. In the ED, she did go into Afib RVR, rate controlled after lopressor 5mg IV x2.     Surgical History  Colostomy, Aflutter RFA     Social History  Former smoker, denies EtOH, and illicit    Family History  CAD, HTN, HLD, DM II, thyroid CA     Allergies  Colchicine, Metformin, Mirtazapine, Rosuvastatin, Simvastatin, Sulfamethoxazole-trimethoprim, Jardiance [empagliflozin], Alendronic acid, Clarithromycin, Fosamax [alendronate], Nitrofurantoin monohyd/m-cryst, and Ofloxacin    Review of Systems   Gastrointestinal:  Positive for nausea and vomiting.   Genitourinary:  Positive for dysuria.   All other systems reviewed and are negative.    Physical Exam  G: aox3, NAD, cooperative  HENT: neck supple, no JVD, MM dry  Eyes: clear sclera  CV: irregularly irregular, tachy s1 s2  L: clear  Abd: soft, NT, non distended  Ext: no c/c/e  N: no appreciable acute focal deficits  Psych: appropriate mood and behavior      Last Recorded Vitals  /84 (Patient Position: Sitting)   Pulse 87   Temp 36.4 °C (97.5 °F) (Temporal)   Resp (!) 24   Wt 72.6 kg (160 lb)   SpO2 97%     Assessment/Plan     Intractable n/v, secondary to GLP-1(Trulicity) dose escalation  PAF with RVR, Atrial flutter s/p RFA ablation on Eliquis  HELEN on CKD III  Acute cystitis  Dehydration  Hyperkalemia  Hypomagnesemia    HTN, HLD  DM II  Hypothyroidism  GERD  Osteoporosis  DVT ppx  Full code    Plan:  - n/v very common side  effect of GLP-1 especially when dose escalated, Trulicity will be stopped, ISS while hospitalized, can follow up with her usual outpatient providers with regards to diabetic regimen optimization, supportive care, PRN antiemetics, clear liquids, advance as tolearted  - Given lopressor 5mg IV x2 with improved rate control, her home toprol XL dose doubled from 12.5mg BID to 25mg BID, first dose given now, continue Eliquis, PRN IV lopressor, cardiology consultation  - Given K lowering cocktail in ED, K improving, IVF NS, follow up AM BMP, bladder scan/PVR, hold lisinopril, metformin. Aldactone  - IV abx rocephin, follow up urine cx  - Replace Mg, follow up AM level  - PT/OT dipesh Ascencio, DO

## 2025-07-21 NOTE — CARE PLAN
The patient's goals for the shift include      The clinical goals for the shift include Remain HDS

## 2025-07-21 NOTE — PROGRESS NOTES
Occupational Therapy    Evaluation    Patient Name: Marissa Rebolledo  MRN: [id number]  Department: [facility]  Room: [address] [place] [address]  Today's Date: 7/21/2025  Time Calculation  Start Time: 0945  Stop Time: 1008  Time Calculation (min): 23 min    Assessment  IP OT Assessment  OT Assessment: Pt demonstrates a decline in adl/functional mobility. Recommend  low  intensity OT tx intervention 2x/week. Good prognosis for goal attainment  Barriers to Discharge Home: No anticipated barriers  End of Session Communication: Bedside nurse  End of Session Patient Position: Bed, 2 rail up, Alarm off, not on at start of session  Plan:  OT Frequency: 2 times per week  OT Discharge Recommendations: Low intensity level of continued care  OT - OK to Discharge:  (once medically cleared)    Subjective   Current Problem:  1. Acute kidney injury  Transesophageal Echo (KIET) With Possible Cardioversion    Transesophageal Echo (KIET) With Possible Cardioversion      2. Nausea and vomiting, unspecified vomiting type  Transesophageal Echo (KIET) With Possible Cardioversion    Transesophageal Echo (KIET) With Possible Cardioversion      3. Hyperkalemia        4. Dehydration        5. PAF (paroxysmal atrial fibrillation) (Multi)  Transesophageal Echo (KIET) With Possible Cardioversion    Transesophageal Echo (KIET) With Possible Cardioversion      6. S/P ablation operation for arrhythmia  Transesophageal Echo (KIET) With Possible Cardioversion    Transesophageal Echo (KIET) With Possible Cardioversion      7. Hypertension, unspecified type  Transesophageal Echo (KIET) With Possible Cardioversion    Transesophageal Echo (KIET) With Possible Cardioversion      8. Pure hypercholesterolemia  Transesophageal Echo (KIET) With Possible Cardioversion    Transesophageal Echo (KIET) With Possible Cardioversion        OT Visit Info:  OT Received On: 07/21/25  General Visit Info:  General  Reason for Referral: OT eval/tx :impaired functional living skills  Referred By:    Family/Caregiver Present: No  Co-Treatment: PT  Co-Treatment Reason: optimize pt indep and safety  Prior to Session Communication: Bedside nurse  Patient Position Received: Bed, 2 rail up, Alarm off, not on at start of session  General Comment: 79 year old female admit with c/o of nausea and vomiting and weakness since medication dosage increase. HELEN  Precautions:  Hearing/Visual Limitations: Wears glasses  Medical Precautions: Fall precautions  Precautions Comment: Clear liquid diet, telemetry, IV,Fall, Telemetry, IV           Pain:  Pain Assessment  Pain Assessment: 0-10  0-10 (Numeric) Pain Score: 0 - No pain    Objective   Cognition:  Overall Cognitive Status: Within Functional Limits  Orientation Level: Oriented X4           Home Living:  Type of Home: House  Lives With: Adult children  Home Adaptive Equipment: Walker rolling or standard, Reacher  Home Layout: Two level, Laundry in basement  Home Access: Stairs to enter with rails  Bathroom Shower/Tub: Tub/shower unit  Bathroom Toilet: Standard  Bathroom Equipment: Grab bars in shower, Shower chair with back, Hand-held shower hose, Bedside commode  Home Living Comments: Patient lives with son in [place] house with son, who works part time. 1st floor bed with a second floor bath. tub/shower combination with chair available and rails/HHS,Stair lift up to 2nd level.No Dme for toliet   Prior Function:  Level of [place]: Independent with ADLs and functional transfers, Independent with homemaking with ambulation  Receives Help From: Family  ADL Assistance: Independent  Homemaking Assistance: Independent  Hand Dominance: Right  Prior Function Comments: Patient reports independence with dressing, bathing, grooming, amb w/o device, drives, cleans and does own laundry.  IADL History:   PTA indep/shared with son  ADL:   PTA indep without adl equip  Activity Tolerance:  Endurance: Endurance does not limit participation in activity  Bed Mobility/Transfers: Bed  Mobility  Bed Mobility: Yes (Independent)    Transfers  Transfer: Yes (Independent)      Functional Mobility:  Functional Mobility  Functional Mobility Performed:  (sba/cga without device, noticible fatigue with mild LOB household distances.)  Sitting Balance:   wfl  Standing Balance:   Fair plus       IADL's: PTA indep, shared with son - including laundry in basement     Vision:  wfl with glasses  Sensation:  Light Touch: No apparent deficits  Strength:  Strength Comments: strength below elbows wfl  Perception:     Coordination:  Movements are Fluid and Coordinated: Yes   Hand Function:  Hand Function  Gross Grasp: Functional  Extremities: RUE   RUE : Within Functional Limits and LUE   LUE: Within Functional Limits    Outcome Measures: [facility] Daily Activity  Putting on and taking off regular lower body clothing: A little  Bathing (including washing, rinsing, drying): A little  Putting on and taking off regular upper body clothing: None  Toileting, which includes using toilet, bedpan or urinal: A little  Taking care of personal grooming such as brushing teeth: A little (sink level)  Eating Meals: None  Daily Activity - Total Score: 20      Education Documentation  Mobility Training, taught by Nicolette Vásquez OT at 7/21/2025  1:51 PM.  Learner: Patient  Readiness: Acceptance  Method: Demonstration  Response: Demonstrated Understanding, Needs Reinforcement    Education Comments  No comments found.      Goals:   Encounter Problems       Encounter Problems (Active)       impaired functional daily living skills       Pt will increase Grooming to s/mod indep (sink level) Lower Body Bathing  to s/mod indep  increase LE Dressing to s/mod indep with/ without adaptive equipment as needed without loss of balance (Progressing)       Start:  07/21/25    Expected End:  08/04/25            Pt will increase   Functional Mobility  with least restrictive device (if any) to imod indep/indep chair/toliet/tub /room to increase  indep/safety in patients discharge environment without LOB (Progressing)       Start:  07/21/25    Expected End:  08/04/25            Pt will increase  energy conservation /work simplification/diaphragmatic breathing performance to indep  to increase independence and safety  within  the patient's discharge environment  (Progressing)       Start:  07/21/25    Expected End:  08/04/25

## 2025-07-21 NOTE — PROGRESS NOTES
07/21/25 1401   Discharge Planning   Living Arrangements Children   Support Systems Children;Family members   Assistance Needed independent   Type of Residence Private residence  (2 level home)   Number of Stairs to Enter Residence 4   Number of Stairs Within Residence   (chair lift)   Home or Post Acute Services None   Expected Discharge Disposition Home   Does the patient need discharge transport arranged? No   Intensity of Service   Intensity of Service 0-30 min     Care transitions at bedside to complete assessment with patient.  TCC introduced self and explained role to patient.  Patient demographics reviewed and verified.  Patient stated that she is independent at home and does not use assistive devices for ambulation.  Patient stated that she drives and is planning on having her daughter to pick her up from the hospital.  Patient [facility] for PT 22.  Plan is home no needs.      PCP: Chris Contreras  Insurance: Medicare  Pharmacy: [facility]

## 2025-07-21 NOTE — PROGRESS NOTES
Physical Therapy      Physical Therapy Evaluation    Patient Name: Marissa Rebolledo  MRN: [id number]  Today's Date: 7/21/2025   Time Calculation  Start Time: 0946  Stop Time: 1008  Time Calculation (min): 22 min  808/808-A    Assessment/Plan   PT Assessment  PT Assessment Results: Decreased endurance  Rehab Prognosis: Good  Barriers to Discharge Home: Physical needs  Physical Needs: Intermittent ADL assistance needed  Evaluation/Treatment Tolerance: Patient tolerated treatment well  Medical Staff Made Aware: Yes  Strengths: Living arrangement secure, Support of extended family/friends  Barriers to Participation: Comorbidities  End of Session Communication: Bedside nurse  Assessment Comment: Patient without symptoms of activity intolerance or N/V at this time. Feels she is slightly below her baseline. One more visit to maximize mobility would be beneficial.  End of Session Patient Position: Bed, 2 rail up, Alarm off, not on at start of session  IP OR SWING BED PT PLAN  Inpatient or Swing Bed: Inpatient  PT Plan  Treatment/Interventions: Transfer training, Gait training, Endurance training, Therapeutic exercise, Therapeutic activity  PT Plan: Ongoing PT  PT Frequency: One time follow up visit  PT Discharge Recommendations: Low intensity level of continued care  PT Recommended Transfer Status: Stand by assist  PT - OK to Discharge: Yes (When cleared by medical team)    Subjective     Current Problem:  1. Acute kidney injury  Transesophageal Echo (KIET) With Possible Cardioversion    Transesophageal Echo (KIET) With Possible Cardioversion      2. Nausea and vomiting, unspecified vomiting type  Transesophageal Echo (KIET) With Possible Cardioversion    Transesophageal Echo (KIET) With Possible Cardioversion      3. Hyperkalemia        4. Dehydration        5. PAF (paroxysmal atrial fibrillation) (Multi)  Transesophageal Echo (KIET) With Possible Cardioversion    Transesophageal Echo (KIET) With Possible Cardioversion      6. S/P  ablation operation for arrhythmia  Transesophageal Echo (KIET) With Possible Cardioversion    Transesophageal Echo (KIET) With Possible Cardioversion      7. Hypertension, unspecified type  Transesophageal Echo (KIET) With Possible Cardioversion    Transesophageal Echo (KIET) With Possible Cardioversion      8. Pure hypercholesterolemia  Transesophageal Echo (KIET) With Possible Cardioversion    Transesophageal Echo (KIET) With Possible Cardioversion        Problem List[1]    General Visit Information:  General  Reason for Referral: PT Eval and Treat: Acute kidney injury, hyperkalemia, dehydration.  Referred By: Hernandez Azar DO  Family/Caregiver Present: No  Co-Treatment: OT  Co-Treatment Reason: [place] patient safety and mobility  Prior to Session Communication: Bedside nurse  Patient Position Received: Bed, 2 rail up, Alarm off, not on at start of session  General Comment: 79 year old female admit with c/o of nausea and vomiting and weakness since medication dosage increase. HELEN    Home Living:  Home Living  Type of Home: House  Lives With: Adult children  Home Adaptive Equipment: Walker rolling or standard, Reacher  Home Layout: Two level, Laundry in basement  Home Access: Stairs to enter with rails  Entrance Stairs-Number of Steps: 4  Bathroom Shower/Tub: Tub/shower unit  Bathroom Toilet: Standard  Bathroom Equipment: Grab bars in shower, Shower chair with back, Hand-held shower hose, Bedside commode  Home Living Comments: Patient lives with son in [place] house with son, who works part time. 1st floor bed, second floor bath. Stair lift up to 2nd level.    Prior Level of Function:  Prior Function Per Pt/Caregiver Report  Level of [place]: Independent with ADLs and functional transfers, Independent with homemaking with ambulation  Receives Help From: Family  ADL Assistance: Independent  Homemaking Assistance: Independent  Hand Dominance: Right  Prior Function Comments: Patient reports independence with dressing,  bathing, grooming, amb w/o device, drives, cleans and does own laundry.    Precautions:  Precautions  Hearing/Visual Limitations: Wears glasses  Medical Precautions: Fall precautions  Precautions Comment: Clear liquid diet       Objective     Pain:  Pain Assessment  Pain Assessment: 0-10  0-10 (Numeric) Pain Score: 0 - No pain    Cognition:  Cognition  Overall Cognitive Status: Within Functional Limits  Orientation Level: Oriented X4    General Assessments:  General Observation  General Observation: Patient pleasant and cooperative with treatment. Reports feeling a little better since she has come in. Shared that 3 members of her family  in close succession of eachother in recent years.   Activity Tolerance  Endurance: Endurance does not limit participation in activity  Activity Tolerance Comments: Tolerated walking 150 ft in jama w/o c/o of fatigue.  Sensation  Light Touch: No apparent deficits  Strength  Strength Comments: Overall at least 4+ to 5/5     Coordination  Movements are Fluid and Coordinated: Yes  Postural Control  Postural Control: Within Functional Limits  Static Sitting Balance  Static Sitting-Level of Assistance: Independent  Dynamic Sitting Balance  Dynamic Sitting-Level of Assistance: Independent  Static Standing Balance  Static Standing-Level of Assistance: Independent  Dynamic Standing Balance  Dynamic Standing-Level of Assistance: Distant supervision    Functional Assessments:     Bed Mobility  Bed Mobility: Yes (Independent)  Transfers  Transfer: Yes (Independent)  Ambulation/Gait Training  Ambulation/Gait Training Performed: Yes (Patient amb 150 ft w/o device with slow pace, grossly steady with CGA to SBA to manage IV line.)          Extremity/Trunk Assessments:  RUE   RUE : Within Functional Limits  LUE   LUE: Within Functional Limits  RLE   RLE : Within Functional Limits  LLE   LLE : Within Functional Limits    Outcome Measures:     [facility] Basic Mobility  Turning from your back to your  side while in a flat bed without using bedrails: None  Moving from lying on your back to sitting on the side of a flat bed without using bedrails: None  Moving to and from bed to chair (including a wheelchair): None  Standing up from a chair using your arms (e.g. wheelchair or bedside chair): None  To walk in hospital room: A little  Climbing 3-5 steps with railing: A little  Basic Mobility - Total Score: 22                                                             Goals:  Encounter Problems       Encounter Problems (Active)       Balance       Goal 1 (Progressing)       Start:  07/21/25    Expected End:  07/28/25       STG - Pt will amb 250 ft ' using least restrictive device if any with modified independence.          Goal 2 (Progressing)       Start:  07/21/25    Expected End:  07/28/25       STG -  Pt will navigate 4 stairs using rail with modified independence.               Education Documentation  Mobility Training, taught by Caryl Ken, PT at 7/21/2025 12:54 PM.  Learner: Family, Patient  Readiness: Acceptance  Method: Explanation  Response: Verbalizes Understanding, Demonstrated Understanding    Education Comments  Patient educated on energy conservation techniques including pacing of activity, PLB and taking appropriate rest breaks with activity.            [1]   Patient Active Problem List  Diagnosis    Anxiety    B12 deficiency    Type 2 diabetes mellitus with stage 3a chronic kidney disease, with long-term current use of insulin (Multi)    Fatigue    GERD (gastroesophageal reflux disease)    Grieving    Hair thinning    Hyperlipidemia    Hypertension    Insomnia    Iron deficiency anemia    Joint pain, hip    Stress at home    Vitamin D deficiency    Cardiomyopathy    Hypothyroidism    Nonrheumatic mitral (valve) insufficiency    Weight loss    Xeroderma    External hordeolum    S/P ablation operation for arrhythmia    Sinus arrhythmia    Hypoxemia    Acute kidney injury

## 2025-07-21 NOTE — CONSULTS
Cardiology Consult Note    Inpatient consult to Cardiology  Consult performed by: Akila Denney, APRN-CNP  Consult ordered by: DO Marissa Stovall Amaury is a 79 y.o. female known to Dr. Tucker and Dr. Vieira with a past medical history significant for forme smoker, PAF/Atrial Flutter (s/p RFA AND PVI in November of 2024 & on Eliquis), HTN, HLD, Type II DM, chronic systolic and diastolic heart failure (EF previously 35% - now 61%), and hypothyroidism.  Patient presented to [facility] on persistent n/v/d, increased reflux, and poor PO intake since 7/18/2025. She was started on Trulicity once weekly injections one month ago and her dose she injected on Thursday was an increased dose and the following day she began having GI symptoms. Cardiology has been consulted for atrial fibrillation with RVR.     Subjective   Today, patient seen and assessed resting comfortably in bed. She denies any chest pain or pressure, shortness of breath, or palpitations. She had some HELEN upon arrival due to dehydration which has improved with IV fluids. She has generalized weakness and fatigue that are improving. She ambulated in the hallway without exertional symptoms. She remains in atrial fibrillation with heart rates 80-120s currently with episodes extending to 130-150's this morning.        ROS:  Review of Systems   Constitutional: Positive for malaise/fatigue.   Cardiovascular:  Negative for chest pain, dyspnea on exertion, irregular heartbeat, leg swelling, near-syncope, orthopnea, palpitations and paroxysmal nocturnal dyspnea.   Respiratory:  Negative for shortness of breath.    Neurological:  Negative for dizziness and light-headedness.        Past Medical History:  Medical History[1]    Problem List Items Addressed This Visit          Genitourinary and Reproductive    * (Principal) Acute kidney injury - Primary    Relevant Medications    sodium chloride 0.9 % bolus 1,000 mL (Completed)    sodium bicarbonate 8.4 % (1  mEq/mL) 50 mEq (Completed)    sodium chloride 0.9% infusion     Other Visit Diagnoses         Nausea and vomiting, unspecified vomiting type          Hyperkalemia          Dehydration                Family History:  No relevant family history has been documented for this patient.    Medications:  Prior to Admission medications   Medication Sig Start Date End Date Taking? Authorizing Provider   albuterol 90 mcg/actuation inhaler Inhale 1-2 puffs every 6 hours if needed for wheezing. 12/15/23  Yes Chris Contreras DO   apixaban (Eliquis) 5 mg tablet Take 1 tablet (5 mg) by mouth 2 times a day. 6/5/25 12/2/25 Yes Chris Contreras DO   atorvastatin (Lipitor) 20 mg tablet Take 1 tablet (20 mg) by mouth once daily.  Patient taking differently: Take 1 tablet (20 mg) by mouth once daily at bedtime. 7/18/25 1/14/26 Yes Chris Contreras DO   blood-glucose meter,continuous (Dexcom G7 ) misc Use as instructed 1/2/25  Yes Chris Contreras DO   blood-glucose meter,continuous (FreeStyle Aneesh 3 Welch) misc Use as instructed 2/4/25  Yes Chris Contreras DO   blood-glucose sensor (Dexcom G7 Sensor) device Replace every 10 days 1/2/25  Yes Chris Contreras DO   blood-glucose sensor (FreeStyle Aneesh 3 Plus Sensor) device 1 each 2 times a day. Replace every 15 days 2/5/25  Yes Chris Contreras DO   cholecalciferol (Vitamin D-3) 25 mcg (1,000 units) capsule Take 1 capsule (25 mcg) by mouth once daily.   Yes Historical Provider, MD   cyanocobalamin (Vitamin B-12) 1,000 mcg tablet Take 1 tablet (1,000 mcg) by mouth once daily.   Yes Historical Provider, MD   esomeprazole (NexIUM) 40 mg DR capsule Take 1 capsule (40 mg) by mouth once daily in the morning. Take before meals. 7/7/25 1/3/26 Yes Chris Contreras DO   glipiZIDE (Glucotrol) 5 mg tablet Take 2 tablets (10 mg) by mouth 2 times a day before meals.  Patient taking differently: Take 0.5 tablets (2.5 mg) by mouth once daily with breakfast. 5/12/25 11/8/25 Yes Chris Contreras,  DO   levothyroxine (Synthroid, Levoxyl) 50 mcg tablet Take 1 tablet (50 mcg) by mouth once daily. 7/7/25 1/3/26 Yes Chris Contreras DO   lisinopril 40 mg tablet TAKE 1 TABLET(40 MG) BY MOUTH EVERY DAY  Patient taking differently: Take 1 tablet (40 mg) by mouth once daily in the morning. 1/20/25  Yes Abhilash Vieira MD   metoprolol succinate XL (Toprol-XL) 25 mg 24 hr tablet Take 0.5 tablets (12.5 mg) by mouth 2 times a day. Do not crush or chew. 5/12/25 11/8/25 Yes Chris Contreras DO   spironolactone (Aldactone) 50 mg tablet Take 1 tablet (50 mg) by mouth once daily.  Patient taking differently: Take 1 tablet (50 mg) by mouth once daily in the morning. 5/12/25 11/8/25 Yes Chris Contreras DO   venlafaxine XR (Effexor-XR) 150 mg 24 hr capsule Take 1 capsule (150 mg) by mouth once daily at bedtime. 2/27/25 8/26/25 Yes Chris Contreras DO   venlafaxine XR (Effexor-XR) 75 mg 24 hr capsule Take 1 capsule (75 mg) by mouth once daily in the morning. Take with food.  Patient taking differently: Take 1 capsule (75 mg) by mouth once daily as needed. Take with food. 6/26/25 10/24/25 Yes Khoi Petty, DO   azelastine (Astelin) 137 mcg (0.1 %) nasal spray Administer 1 spray into each nostril 2 times a day.  Patient not taking: Reported on 7/20/2025 12/6/21   Historical Provider, MD   dulaglutide (Trulicity) 1.5 mg/0.5 mL pen injector injection Inject 1.5 mg under the skin 1 (one) time per week.  Patient taking differently: Inject 1.5 mg under the skin 1 (one) time per week. Every Thursday 7/7/25   Chris Contreras DO   insulin lispro (HumaLOG KwikPen Insulin) 100 unit/mL pen Inject 3 times daily using sliding scale: 0-150 no units, 151-200 2 units, 201-250 4 units, 251-300 6 units, 301-350 8 units, 351-400 10 units, above 400 call Dr. Ben Bishop 30 units daily  Patient taking differently: Inject 0-10 Units under the skin 3 times daily (morning, midday, late afternoon). Per sliding scale: 0-150 = 0 units; 151-200 2  units, 201-250 4 units, 251-300 6 units, 301-350 8 units, 351-400 10 units, above 400 call Dr. Contreras  Max 30 units daily 6/5/25   Chris Contreras DO   metFORMIN  mg 24 hr tablet Take 2 tablets (1,000 mg) by mouth 2 times daily (morning and late afternoon).  Patient not taking: Reported on 7/20/2025 7/19/24 1/15/25  Elina Hart APRN-CNP   atorvastatin (Lipitor) 20 mg tablet Take 1 tablet (20 mg) by mouth once daily. 12/27/24 7/18/25  Chris Contreras DO     Current Medications[2]    Allergies:  Allergies[3]    Social History:  Social History     Tobacco Use    Smoking status: Former     Types: Cigarettes     Passive exposure: Past    Smokeless tobacco: Never   Substance Use Topics    Alcohol use: Not Currently       Physical Exam:    Physical Exam  Constitutional:       Appearance: Normal appearance.   HENT:      Head: Normocephalic and atraumatic.     Cardiovascular:      Rate and Rhythm: Tachycardia present. Rhythm irregular.      Heart sounds: Normal heart sounds, S1 normal and S2 normal. No murmur heard.  Pulmonary:      Effort: Pulmonary effort is normal.      Breath sounds: Normal breath sounds.     Musculoskeletal:      Cervical back: Normal range of motion and neck supple.      Right lower leg: No edema.      Left lower leg: No edema.     Neurological:      General: No focal deficit present.      Mental Status: She is alert and oriented to person, place, and time.     Psychiatric:         Mood and Affect: Mood normal.         Behavior: Behavior normal.          Last Recorded Vitals    Visit Vitals  /60 (BP Location: Left arm, Patient Position: Lying)   Pulse 96   Temp 35.8 °C (96.4 °F) (Temporal)   Resp 16          Intake/Output Summary (Last 24 hours) at 7/21/2025 1029  Last data filed at 7/21/2025 0903  Gross per 24 hour   Intake 2137.09 ml   Output 350 ml   Net 1787.09 ml        /60 (BP Location: Left arm, Patient Position: Lying)   Pulse 96   Temp 35.8 °C (96.4 °F) (Temporal)    Resp 16   Wt 74.8 kg (165 lb)   SpO2 98%   Intake/Output last 3 Shifts:    Intake/Output Summary (Last 24 hours) at 7/21/2025 1029  Last data filed at 7/21/2025 0903  Gross per 24 hour   Intake 2137.09 ml   Output 350 ml   Net 1787.09 ml       Admission Weight  Weight: 72.6 kg (160 lb) (07/20/25 1106)    Daily Weight  07/21/25 : 74.8 kg (165 lb)        Recent Image Results  CT abdomen pelvis wo IV contrast  Narrative: Interpreted By:  Elina Poole,   STUDY:  CT ABDOMEN PELVIS WO IV CONTRAST;  7/20/2025 2:57 pm      INDICATION:  Signs/Symptoms:Vomiting, rule out bowel obstruction.      COMPARISON:  None.      ACCESSION NUMBER(S):  [id number]      ORDERING CLINICIAN:  TIMA CAUSEY      TECHNIQUE:  Axial noncontrast CT images of the abdomen and pelvis with coronal  and sagittal reconstructed images.      FINDINGS:  LOWER CHEST: Mild subpleural fibrotic changes and scattered cysts at  the lung bases. Small hiatal hernia. BONES: No acute osseous  abnormality. Mild multilevel degenerative disc changes. Facet  arthropathy noted. Mild bilateral hip osteoarthrosis. ABDOMINAL WALL:  Within normal limits.      ABDOMEN:  Lack of intravenous contrast limits evaluation of vessels and solid  organs. LIVER: Within normal limits.  BILE DUCTS: No biliary dilatation.  GALLBLADDER: Cholelithiasis. No pericholecystic inflammatory changes.  PANCREAS: No peripancreatic inflammatory stranding or duct dilatation.  SPLEEN: Within normal limits.  ADRENALS: Within normal limits.      KIDNEYS, URETERS, URINARY BLADDER: No hydronephrosis or urinary tract  calculus.  The urinary bladder is unremarkable.      VESSELS: No aortic aneurysm. Moderate aortic calcification.  RETROPERITONEUM: No pathologically enlarged lymph nodes.      PELVIS:      REPRODUCTIVE ORGANS: No abnormality, given limitations of the  noncontrast CT.  No significant free pelvic fluid.      BOWEL: The stomach is nondistended. No dilated bowel. Right  hemicolectomy with  ileocolic anastomosis. PERITONEUM: No ascites or  free air, no fluid collection.      Impression: No acute abdominal or pelvic process.              MACRO:  None      Signed by: Elina Poole 7/20/2025 4:10 PM  Dictation workstation:   [id number]  XR chest 1 view  Narrative: Interpreted By:  Gregoria Mendoza,   STUDY:  XR CHEST 1 VIEW;  7/20/2025 12:13 pm      INDICATION:  Signs/Symptoms:vomiting.      COMPARISON:  06/27/2020      ACCESSION NUMBER(S):  [id number]      ORDERING CLINICIAN:  TIMA CAUSEY      FINDINGS:  The cardiac silhouette is slightly prominent. There are  atherosclerotic changes of the aorta.      There is no consolidation or pleural fluid.      The spine is not well seen. There are degenerative changes of the  shoulders.      COMPARISON OF FINDING:  The chest is similar.      Impression: No acute pulmonary disease. Prominent cardiac silhouette.      MACRO:  none      Signed by: Gregoria Mendoza 7/20/2025 12:20 PM  Dictation workstation:   [id number]        Relevant Cardiac Testing:  Echo 6/11/2025  CONCLUSIONS:   1. Left ventricular ejection fraction is normal calculated by Holbrook's biplane at 61%.   2. Spectral Doppler shows a Grade I (impaired relaxation pattern) of left ventricular diastolic filling with normal left atrial filling pressure.   3. There is normal right ventricular global systolic function.   4. The Doppler estimated RVSP is within normal limits at 30 mmHg.      Assessment/Plan    Marissa Rebolledo is a 79 y.o. female known to Dr. Tucker and Dr. Vieira with a past medical history significant for forme smoker, PAF/Atrial Flutter (s/p RFA & PVI in November of 2024 & on Eliquis), HTN, HLD, Type II DM, chronic systolic heart failure (EF previously 35% - now 61%) , and hypothyroidism.  Patient presented to [facility] on persistent n/v/d, increased reflux, and poor PO intake since 7/18/2025. She was started on Trulicity once weekly injections one month ago and her dose she injected on Thursday  was an increased dose and the following day she began having GI symptoms. Cardiology has been consulted for atrial fibrillation with RVR.     Today, patient seen and assessed resting comfortably in bed. She denies any chest pain or pressure, shortness of breath, or palpitations. She had some HELEN upon arrival due to dehydration which has improved with IV fluids. She has generalized weakness and fatigue that are improving. She ambulated in the hallway without exertional symptoms.     Atrial Fibrillation with RVR   - s/p RFA & PVI in November of 2024. She reports she has not had recurrence of PAF or A. Flutter since ablation to her knowledge. A. Fib RVR likely triggered by underlying electrolyte abnormalities and dehydration.  - Patient denies chest pain/pressure, shortness of breath or palpitations. She endorses generalized weakness and fatigue but notes these are improving with hydration and no recurrent vomiting overnight.   - She remains in atrial fibrillation with heart rates 80-120s currently with episodes extending to 130-150's this morning. Blood pressure soft: 90/68, 110/60.   - Patient on Toprol XL 25 mg BID currently, she normally takes 12.5 mg Toprol XL BID at home. Recommend to change BB to metoprolol tartrate 25 mg TID to see if we can obtain better rate control.  - She missed her Eliquis yesterday being in the emergency room but did receive Eliquis 5 mg at 0100 and then again at 0823 this morning. She had nausea and vomiting for 3 days so unclear how much Eliquis she actually received. Given   - Unable to complete CT Watchman due to HLEEN. Therefore, would recommend to proceed with KIET and DCCV tomorrow. Keep patient NPO at midnight.     Chronic Systolic and Diastolic Heart Failure   - EF Previously 35% in September of 2024. Patient underwent AF/PAF ablation In November of 2024. EF most recently 61% with grade I Diastolic dysfunction in June of 2025.   - She is maintained on ACE, BB, and Aldosterone  Antagonist at home. Lisinopril and Aldactone on hold in setting of HELEN/Dehydration and to allow for further blood pressure room to treat her A. Fib RVR  - Patient appears compensated and euvolemic upon exam. Will resume home medications when blood pressure and renal status allows.  - Low sodium diet   - Daily weights  - Strict Intake and Output.    HTN   - BP: 90/68, 110/60  - Home Lisinopril 40  and Aldactone 50 mg on hold    HLD  - Continue atorvastatin 20 mg daily at bedtime    HELEN   - Creatinine 2.09 upon arrival. Improved to 1.52       Discussed case with Ramicone. Cardiology will continue to follow.. Thank you for the Consult. Please reach out with any questions of concerns.    Akila Denney, APRN-CNP            [1]   Past Medical History:  Diagnosis Date    A-fib (Multi)     Acute sinusitis, unspecified     Acute sinusitis    Anxiety disorder, unspecified 03/08/2021    Anxiety    Bleeding hemorrhoids 03/10/2023    Cellulitis, unspecified     Cellulitis    CHF (congestive heart failure)     Chronic gastric ulcer without hemorrhage or perforation     Chronic gastric ulcer    Diabetes mellitus (Multi)     Diverticulosis of intestine, part unspecified, without perforation or abscess without bleeding     Diverticulosis    Gross hematuria     Gross hematuria    Hyperlipidemia     Hypertension     Other conditions influencing health status     Generalized Anxiety Disorder    Other conditions influencing health status     Urinary Tract Infection    Other conditions influencing health status 12/11/2013    Colon Cancer    Other hemorrhoids     Internal hemorrhoids    Other malaise     Malaise    Other specified diseases of intestine     Mass of colon    Other specified epidermal thickening     Keratosis pilaris    Personal history of diseases of the blood and blood-forming organs and certain disorders involving the immune mechanism     History of iron deficiency anemia    Personal history of other diseases of the  circulatory system     History of hypertension    Personal history of other diseases of the digestive system     History of esophageal reflux    Personal history of other diseases of the musculoskeletal system and connective tissue     History of low back pain    Personal history of other diseases of the musculoskeletal system and connective tissue     History of osteopenia    Personal history of other diseases of the respiratory system     Personal history of sinusitis    Personal history of other endocrine, nutritional and metabolic disease     History of hyperlipidemia    Personal history of other infectious and parasitic diseases     History of tinea corporis    Personal history of other specified conditions     History of diarrhea    Personal history of other specified conditions     History of headache    Vitamin D deficiency, unspecified 07/02/2021    Vitamin D deficiency   [2]   Current Facility-Administered Medications   Medication Dose Route Frequency Provider Last Rate Last Admin    acetaminophen (Tylenol) tablet 650 mg  650 mg oral q4h PRN Dm S Ascencio, DO        apixaban (Eliquis) tablet 5 mg  5 mg oral BID Dm S Ascencio, DO   5 mg at 07/21/25 0823    atorvastatin (Lipitor) tablet 20 mg  20 mg oral Nightly Md S Ascencio, DO   20 mg at 07/21/25 0057    cefTRIAXone (Rocephin) 1 g in dextrose (iso) IV 50 mL  1 g intravenous q24h Dm S Ascencio, DO   Stopped at 07/21/25 0054    cyanocobalamin (Vitamin B-12) tablet 1,000 mcg  1,000 mcg oral Daily Dm S Ascencio, DO   1,000 mcg at 07/21/25 0823    dextrose 50 % injection 12.5 g  12.5 g intravenous q15 min PRN Dm S Ascencio, DO        dextrose 50 % injection 25 g  25 g intravenous q15 min PRN Dm S Ascencio, DO        glucagon (Glucagen) injection 1 mg  1 mg intramuscular q15 min PRN Dm S Ascencio, DO        glucagon (Glucagen) injection 1 mg  1 mg intramuscular q15 min PRN Dm S Ascencio, DO        insulin lispro injection 0-10 Units  0-10 Units subcutaneous Before meals  & nightly Dm S Ascencio, DO        levothyroxine (Synthroid, Levoxyl) tablet 50 mcg  50 mcg oral Daily Dm S Ascencio, DO   50 mcg at 07/21/25 0603    magnesium chloride (MagDelay) 535 mg (64 mg elemental) EC tablet 1 tablet  64 mg of elemental magnesium oral Daily Hernandez DEBRA Azar, DO        metoprolol succinate XL (Toprol-XL) 24 hr tablet 25 mg  25 mg oral BID Dm S Ascencio, DO   25 mg at 07/21/25 0823    metoprolol tartrate (Lopressor) injection 5 mg  5 mg intravenous q4h PRN Dm S Ascencio, DO        ondansetron (Zofran) injection 4 mg  4 mg intravenous q6h PRN Dm S Ascencio, DO   4 mg at 07/21/25 0851    pantoprazole (ProtoNix) EC tablet 40 mg  40 mg oral BID AC Dm S Ascencio, DO   40 mg at 07/21/25 0603    sodium chloride 0.9% infusion  100 mL/hr intravenous Continuous Dm S Ascencio,  mL/hr at 07/21/25 0903 100 mL/hr at 07/21/25 0903    venlafaxine XR (Effexor-XR) 24 hr capsule 150 mg  150 mg oral Nightly Dm S Ascencio, DO   150 mg at 07/21/25 0057   [3]   Allergies  Allergen Reactions    Colchicine Other     Did not feel well with it    Metformin Headache and GI Upset     Abdominal pain    Mirtazapine Hives    Rosuvastatin Myalgia     Muscle spasm/weakness    Simvastatin Myalgia     Muscle spasms/weakness  Leg cramps     Sulfamethoxazole-Trimethoprim Hives    Jardiance [Empagliflozin] Other    Alendronic Acid Other    Clarithromycin Other     weak    Fosamax [Alendronate] Other     weak    Nitrofurantoin Monohyd/M-Cryst Other     weak    Ofloxacin Other     weakness

## 2025-07-22 ENCOUNTER — ANESTHESIA EVENT (OUTPATIENT)
Dept: CARDIOLOGY | Facility: HOSPITAL | Age: 80
End: 2025-07-22
Payer: MEDICARE

## 2025-07-22 ENCOUNTER — APPOINTMENT (OUTPATIENT)
Dept: CARDIOLOGY | Facility: HOSPITAL | Age: 80
End: 2025-07-22
Payer: MEDICARE

## 2025-07-22 ENCOUNTER — ANESTHESIA (OUTPATIENT)
Dept: CARDIOLOGY | Facility: HOSPITAL | Age: 80
End: 2025-07-22
Payer: MEDICARE

## 2025-07-22 LAB
ANION GAP SERPL CALC-SCNC: 9 MMOL/L (ref 10–20)
BODY SURFACE AREA: 1.81 M2
BUN SERPL-MCNC: 14 MG/DL (ref 6–23)
CALCIUM SERPL-MCNC: 8.4 MG/DL (ref 8.6–10.3)
CHLORIDE SERPL-SCNC: 109 MMOL/L (ref 98–107)
CO2 SERPL-SCNC: 26 MMOL/L (ref 21–32)
CREAT SERPL-MCNC: 1.33 MG/DL (ref 0.5–1.05)
EGFRCR SERPLBLD CKD-EPI 2021: 41 ML/MIN/1.73M*2
GLUCOSE BLD MANUAL STRIP-MCNC: 114 MG/DL (ref 74–99)
GLUCOSE BLD MANUAL STRIP-MCNC: 86 MG/DL (ref 74–99)
GLUCOSE BLD MANUAL STRIP-MCNC: 89 MG/DL (ref 74–99)
GLUCOSE SERPL-MCNC: 102 MG/DL (ref 74–99)
HOLD SPECIMEN: NORMAL
MAGNESIUM SERPL-MCNC: 1.86 MG/DL (ref 1.6–2.4)
POTASSIUM SERPL-SCNC: 4.5 MMOL/L (ref 3.5–5.3)
SODIUM SERPL-SCNC: 139 MMOL/L (ref 136–145)

## 2025-07-22 PROCEDURE — 2500000002 HC RX 250 W HCPCS SELF ADMINISTERED DRUGS (ALT 637 FOR MEDICARE OP, ALT 636 FOR OP/ED): Performed by: STUDENT IN AN ORGANIZED HEALTH CARE EDUCATION/TRAINING PROGRAM

## 2025-07-22 PROCEDURE — 99232 SBSQ HOSP IP/OBS MODERATE 35: CPT | Performed by: INTERNAL MEDICINE

## 2025-07-22 PROCEDURE — 5A2204Z RESTORATION OF CARDIAC RHYTHM, SINGLE: ICD-10-PCS | Performed by: INTERNAL MEDICINE

## 2025-07-22 PROCEDURE — 92960 CARDIOVERSION ELECTRIC EXT: CPT | Performed by: INTERNAL MEDICINE

## 2025-07-22 PROCEDURE — A93312 PR ECHO HEART,TRANSESOPHAGEAL,COMPLETE: Performed by: ANESTHESIOLOGY

## 2025-07-22 PROCEDURE — 2720000007 HC OR 272 NO HCPCS

## 2025-07-22 PROCEDURE — 2500000005 HC RX 250 GENERAL PHARMACY W/O HCPCS: Performed by: INTERNAL MEDICINE

## 2025-07-22 PROCEDURE — 92960 CARDIOVERSION ELECTRIC EXT: CPT

## 2025-07-22 PROCEDURE — 3700000001 HC GENERAL ANESTHESIA TIME - INITIAL BASE CHARGE

## 2025-07-22 PROCEDURE — 2500000004 HC RX 250 GENERAL PHARMACY W/ HCPCS (ALT 636 FOR OP/ED): Performed by: INTERNAL MEDICINE

## 2025-07-22 PROCEDURE — 36415 COLL VENOUS BLD VENIPUNCTURE: CPT | Performed by: INTERNAL MEDICINE

## 2025-07-22 PROCEDURE — 2500000001 HC RX 250 WO HCPCS SELF ADMINISTERED DRUGS (ALT 637 FOR MEDICARE OP): Performed by: INTERNAL MEDICINE

## 2025-07-22 PROCEDURE — 2500000004 HC RX 250 GENERAL PHARMACY W/ HCPCS (ALT 636 FOR OP/ED): Performed by: STUDENT IN AN ORGANIZED HEALTH CARE EDUCATION/TRAINING PROGRAM

## 2025-07-22 PROCEDURE — 7100000002 HC RECOVERY ROOM TIME - EACH INCREMENTAL 1 MINUTE

## 2025-07-22 PROCEDURE — 82947 ASSAY GLUCOSE BLOOD QUANT: CPT

## 2025-07-22 PROCEDURE — 83735 ASSAY OF MAGNESIUM: CPT | Performed by: INTERNAL MEDICINE

## 2025-07-22 PROCEDURE — 2500000001 HC RX 250 WO HCPCS SELF ADMINISTERED DRUGS (ALT 637 FOR MEDICARE OP): Performed by: STUDENT IN AN ORGANIZED HEALTH CARE EDUCATION/TRAINING PROGRAM

## 2025-07-22 PROCEDURE — 93325 DOPPLER ECHO COLOR FLOW MAPG: CPT | Performed by: INTERNAL MEDICINE

## 2025-07-22 PROCEDURE — 99100 ANES PT EXTEME AGE<1 YR&>70: CPT | Performed by: ANESTHESIOLOGY

## 2025-07-22 PROCEDURE — A93312 PR ECHO HEART,TRANSESOPHAGEAL,COMPLETE: Performed by: ANESTHESIOLOGIST ASSISTANT

## 2025-07-22 PROCEDURE — 99233 SBSQ HOSP IP/OBS HIGH 50: CPT | Performed by: NURSE PRACTITIONER

## 2025-07-22 PROCEDURE — 2500000001 HC RX 250 WO HCPCS SELF ADMINISTERED DRUGS (ALT 637 FOR MEDICARE OP): Performed by: NURSE PRACTITIONER

## 2025-07-22 PROCEDURE — 1200000002 HC GENERAL ROOM WITH TELEMETRY DAILY

## 2025-07-22 PROCEDURE — 2500000004 HC RX 250 GENERAL PHARMACY W/ HCPCS (ALT 636 FOR OP/ED): Performed by: ANESTHESIOLOGIST ASSISTANT

## 2025-07-22 PROCEDURE — 99233 SBSQ HOSP IP/OBS HIGH 50: CPT | Performed by: INTERNAL MEDICINE

## 2025-07-22 PROCEDURE — 80048 BASIC METABOLIC PNL TOTAL CA: CPT | Performed by: INTERNAL MEDICINE

## 2025-07-22 PROCEDURE — 93320 DOPPLER ECHO COMPLETE: CPT | Performed by: INTERNAL MEDICINE

## 2025-07-22 PROCEDURE — 93320 DOPPLER ECHO COMPLETE: CPT

## 2025-07-22 PROCEDURE — 93312 ECHO TRANSESOPHAGEAL: CPT | Performed by: INTERNAL MEDICINE

## 2025-07-22 PROCEDURE — 7100000001 HC RECOVERY ROOM TIME - INITIAL BASE CHARGE

## 2025-07-22 PROCEDURE — 3700000002 HC GENERAL ANESTHESIA TIME - EACH INCREMENTAL 1 MINUTE

## 2025-07-22 RX ORDER — LIDOCAINE HYDROCHLORIDE 20 MG/ML
SOLUTION OROPHARYNGEAL AS NEEDED
Status: DISCONTINUED | OUTPATIENT
Start: 2025-07-22 | End: 2025-07-22 | Stop reason: HOSPADM

## 2025-07-22 RX ORDER — PROPOFOL 10 MG/ML
INJECTION, EMULSION INTRAVENOUS AS NEEDED
Status: DISCONTINUED | OUTPATIENT
Start: 2025-07-22 | End: 2025-07-22

## 2025-07-22 RX ORDER — MIDAZOLAM HYDROCHLORIDE 1 MG/ML
INJECTION, SOLUTION INTRAMUSCULAR; INTRAVENOUS AS NEEDED
Status: DISCONTINUED | OUTPATIENT
Start: 2025-07-22 | End: 2025-07-22 | Stop reason: HOSPADM

## 2025-07-22 RX ORDER — FENTANYL CITRATE 50 UG/ML
INJECTION, SOLUTION INTRAMUSCULAR; INTRAVENOUS AS NEEDED
Status: DISCONTINUED | OUTPATIENT
Start: 2025-07-22 | End: 2025-07-22 | Stop reason: HOSPADM

## 2025-07-22 RX ORDER — METOPROLOL TARTRATE 25 MG/1
25 TABLET, FILM COATED ORAL 2 TIMES DAILY
Status: DISCONTINUED | OUTPATIENT
Start: 2025-07-23 | End: 2025-07-23

## 2025-07-22 RX ADMIN — MAGNESIUM 64 MG (MAGNESIUM CHLORIDE) TABLET,DELAYED RELEASE 1 TABLET: at 09:36

## 2025-07-22 RX ADMIN — MIDAZOLAM 1 MG: 1 INJECTION INTRAMUSCULAR; INTRAVENOUS at 15:15

## 2025-07-22 RX ADMIN — PANTOPRAZOLE SODIUM 40 MG: 40 TABLET, DELAYED RELEASE ORAL at 06:02

## 2025-07-22 RX ADMIN — METOPROLOL TARTRATE 25 MG: 25 TABLET, FILM COATED ORAL at 09:36

## 2025-07-22 RX ADMIN — APIXABAN 5 MG: 5 TABLET, FILM COATED ORAL at 20:28

## 2025-07-22 RX ADMIN — BENZOCAINE, BUTAMBEN, AND TETRACAINE HYDROCHLORIDE 1 SPRAY: .028; .004; .004 AEROSOL, SPRAY TOPICAL at 15:08

## 2025-07-22 RX ADMIN — METOPROLOL TARTRATE 25 MG: 25 TABLET, FILM COATED ORAL at 17:08

## 2025-07-22 RX ADMIN — FENTANYL CITRATE 50 MCG: 50 INJECTION, SOLUTION INTRAMUSCULAR; INTRAVENOUS at 15:15

## 2025-07-22 RX ADMIN — BENZOCAINE, BUTAMBEN, AND TETRACAINE HYDROCHLORIDE 1 SPRAY: .028; .004; .004 AEROSOL, SPRAY TOPICAL at 15:12

## 2025-07-22 RX ADMIN — LIDOCAINE HYDROCHLORIDE 15 ML: 20 SOLUTION ORAL; TOPICAL at 15:03

## 2025-07-22 RX ADMIN — CYANOCOBALAMIN TAB 1000 MCG 1000 MCG: 1000 TAB at 09:36

## 2025-07-22 RX ADMIN — ATORVASTATIN CALCIUM 20 MG: 40 TABLET, FILM COATED ORAL at 20:28

## 2025-07-22 RX ADMIN — PROPOFOL 40 MG: 10 INJECTION, EMULSION INTRAVENOUS at 15:31

## 2025-07-22 RX ADMIN — CEFTRIAXONE 1 G: 1 INJECTION, SOLUTION INTRAVENOUS at 00:08

## 2025-07-22 RX ADMIN — APIXABAN 5 MG: 5 TABLET, FILM COATED ORAL at 09:36

## 2025-07-22 RX ADMIN — VENLAFAXINE HYDROCHLORIDE 150 MG: 75 CAPSULE, EXTENDED RELEASE ORAL at 20:28

## 2025-07-22 RX ADMIN — LEVOTHYROXINE SODIUM 50 MCG: 0.05 TABLET ORAL at 06:02

## 2025-07-22 RX ADMIN — PANTOPRAZOLE SODIUM 40 MG: 40 TABLET, DELAYED RELEASE ORAL at 17:08

## 2025-07-22 SDOH — HEALTH STABILITY: MENTAL HEALTH: CURRENT SMOKER: 0

## 2025-07-22 ASSESSMENT — PAIN SCALES - GENERAL
PAINLEVEL_OUTOF10: 0 - NO PAIN

## 2025-07-22 ASSESSMENT — COGNITIVE AND FUNCTIONAL STATUS - GENERAL
MOBILITY SCORE: 22
DAILY ACTIVITIY SCORE: 24
CLIMB 3 TO 5 STEPS WITH RAILING: A LITTLE
WALKING IN HOSPITAL ROOM: A LITTLE

## 2025-07-22 ASSESSMENT — ENCOUNTER SYMPTOMS
DIZZINESS: 0
DYSPNEA ON EXERTION: 0
ORTHOPNEA: 0
SHORTNESS OF BREATH: 0
PND: 0
LIGHT-HEADEDNESS: 0

## 2025-07-22 ASSESSMENT — PAIN - FUNCTIONAL ASSESSMENT
PAIN_FUNCTIONAL_ASSESSMENT: 0-10

## 2025-07-22 NOTE — NURSING NOTE
0745-Dr. Pierre at bedside with Dr. Wakefield  discussing patient GLP-1 medication (Trulicity) that was taken on Thursday and that her last meal was at 1800; because medication hasn't been held for 7 days anesthesia is recommending patient do an extended fast (closer to 24 hrs) prior to performing GA sedation d/t the delayed gastric emptying effects caused by the GLP-1. Discussion occurred amongst team members and patient and decision was made to keep patient NPO and delay procedure until 1500 today to achieve an extended fast.    0805-patient sent back to room and report called to Brannon RN on the 8th floor instructing to keep patient NPO until procedure at 1500 now

## 2025-07-22 NOTE — PROGRESS NOTES
"                                                                 Hospital Medicine Progress Note       Patient Name: Marissa Rebolledo   YOB: 1945    Subjective:    Marissa Rebolledo is a 79 y.o.yo female who is hospital day 2     KIET cardioversion delayed until this afternoon due to recent GLP1 use they want a longer NPO window.      Objective:    Recent labs, imaging, vital signs and notes from other providers were reviewed     /84 (BP Location: Left arm, Patient Position: Lying)   Pulse 78   Temp 35.7 °C (96.3 °F) (Temporal)   Resp 16   Ht 1.575 m (5' 2\")   Wt 74.8 kg (165 lb)   LMP  (LMP Unknown)   SpO2 98%   BMI 30.18 kg/m²     Physical Exam:    GENERAL: well developed, non-toxic, NAD, alert & cooperative  HEENT: normocephalic, atraumatic, sclera clear  CARDIAC: irregularly irregular rate & rhythm  PULMONARY: CTA b/l, no respiratory distress, - wheezes  ABDOMEN: soft, non-tender, non-distended  MSK: no peripheral edema, no obvious deformity  NEURO: non-focal, CN II-XII grossly intact  SKIN: Warm and dry, no lesions, no rashes.  PSYCH: appropriate mood & affect     Assessment/Plan:    Afib with RVR  HELEN, baseline Scr ~ 1.0  Nausea and vomiting 2/2 GLP-1  Acute cystitis with hematuria  Hyperkalemia  Hypomagnesemia  HTN  HLD  DM II  Hypothyroidism  GERD  Osteoporosis      Patient will undergo KIET guided DCCV later today. She is on Eliquis and Metoprolol. EP following   HELEN is improving, okay to let IVF  today. Hold ACEI. Hold    Cont Rocephin, fu urine culture  Home DM regimen is on hold, cont SSI      DVT Prophylaxis: Eliquis   Code Status: FULL CODE  Disposition: home tmrw     Sameer Ascencio, DO  Hospital Medicine    "

## 2025-07-22 NOTE — CARE PLAN
The patient's goals for the shift include      The clinical goals for the shift include complete ECHO

## 2025-07-22 NOTE — ANESTHESIA PREPROCEDURE EVALUATION
Patient: Marissa Rebolledo    Procedure Information       Anesthesia Start Date/Time: 07/22/25 1529    Scheduled providers: Sabi Pierre MD; Demetrio Wakefield MD    Procedure: TRANSESOPHAGEAL ECHO (KIET) W/ POSSIBLE CARDIOVERSION    Location: [facility]            Relevant Problems   Anesthesia (within normal limits)      Cardiac   (+) Hyperlipidemia   (+) Hypertension   (+) Nonrheumatic mitral (valve) insufficiency   (+) Sinus arrhythmia      Neuro   (+) Anxiety      GI   (+) GERD (gastroesophageal reflux disease)      Endocrine   (+) Hypothyroidism   (+) Type 2 diabetes mellitus with stage 3a chronic kidney disease, with long-term current use of insulin (Multi)      Hematology   (+) Iron deficiency anemia       Clinical information reviewed:   Tobacco  Allergies  Meds   Med Hx  Surg Hx   Fam Hx  Soc Hx        NPO Detail:  NPO/Void Status  Date of Last Liquid: 07/21/25  Time of Last Liquid: 2300  Date of Last Solid: 07/21/25  Time of Last Solid: 1700         Physical Exam    Airway  Mallampati: II  TM distance: >3 FB  Neck ROM: full  Mouth opening: 3 or more finger widths     Cardiovascular   Rhythm: irregular     Dental - normal exam     Pulmonary - normal exam   Abdominal - normal exam           Anesthesia Plan    History of general anesthesia?: yes  History of complications of general anesthesia?: no    ASA 3     MAC     The patient is not a current smoker.  Patient was previously instructed to abstain from smoking on day of procedure.  Patient did not smoke on day of procedure.    intravenous induction   Anesthetic plan and risks discussed with patient.    Plan discussed with CAA.

## 2025-07-22 NOTE — PROGRESS NOTES
"Marissa Rebolledo is a 79 y.o. female on day 2 of admission presenting with Acute kidney injury.            HPI:    The patient is feeling much better today following the KIET guided cardioversion.  She was seen in conjunction with CELIA Ugarte.  Please refer to her dictated progress note for details.    Physical Exam:    General Appearance:  Alert, oriented, no distress  Skin:  Warm and dry  Head and Neck:  No elevation of JVP, no carotid bruits  Cardiac Exam:  Rhythm is regular, S1 and S2 are normal, no murmur S3 or S4  Lungs:  Clear to auscultation  Extremities:  no edema  Neurologic:  No focal deficits  Psychiatric:  Appropriate mood and behavior     Last Recorded Vitals  Blood pressure 111/55, pulse 69, temperature 36 °C (96.8 °F), resp. rate 16, height 1.575 m (5' 2\"), weight 74.8 kg (165 lb), SpO2 98%.  Intake/Output last 3 Shifts:  I/O last 3 completed shifts:  In: 4183.8 (55.9 mL/kg) [P.O.:840; I.V.:3293.8 (44 mL/kg); IV Piggyback:50]  Out: 350 (4.7 mL/kg) [Urine:350 (0.1 mL/kg/hr)]  Weight: 74.8 kg     Medications:  Scheduled medications  Scheduled Medications[1]    Labs:    CMP:  Recent Labs     07/22/25  0536 07/21/25  0929 07/20/25  2353 07/20/25  1227 05/12/25  1120 01/06/25  1025 10/28/24  1323 06/27/24  2048 06/27/24  1635 01/16/23  1412 11/05/22  0609    136 136 134* 137 141 141  --  136   < > 140   K 4.5 4.6 4.6 5.9* 5.6* 4.8 4.9 4.3 5.7*   < > 3.9   * 104 105 99 101 103 103  --  102   < > 104   CO2 26 24 20* 22 27 30 28  --  26   < > 26   ANIONGAP 9* 13 16 19 9 13 15  --  14   < > 14   BUN 14 22 26* 30* 15 14 13  --  12   < > 15   CREATININE 1.33* 1.52* 1.50* 2.09* 1.10* 1.02 1.07*  --  0.91   < > 0.90   EGFR 41* 35* 36* 24* 51* 56* 54*  --  65   < >  --    MG 1.86 1.92 1.44* 1.54*  --   --   --   --  1.71  --  1.83    < > = values in this interval not displayed.     Recent Labs     07/20/25  1227 05/12/25  1120 01/06/25  1025 06/27/24  1635 06/24/24  1222   ALBUMIN 4.9 4.6 " "4.0 4.3 4.1   ALKPHOS 101 76 80 68 69   ALT 13 15 28 11 12   AST 17 22 25 37 16   BILITOT 0.5 0.4 0.4 0.4 0.7   LIPASE 24  --   --   --   --      CBC:  Recent Labs     07/21/25  0929 07/20/25  2353 07/20/25  1227 05/12/25  1120 01/06/25  1025 10/28/24  1323 06/27/24  1635 06/24/24  1222   WBC 12.8* 17.6* 14.6* 8.3 7.1 8.4 8.3 7.7   HGB 12.6 13.4 15.7 13.3 12.9 13.2 12.7 12.2   HCT 39.5 40.7 48.0* 41.0 40.3 43.4 40.0 38.5    178 245 234 198 246 189 209   MCV 89 88 86 88.0 86 83 84 83     COAG:   Recent Labs     10/28/24  1323 06/27/24  1635 02/15/23  0627   INR 1.7* 1.5* 1.3*     ABO:   Recent Labs     10/28/24  1323   ABO AB     HEME/ENDO:  Recent Labs     07/21/25  0929 05/12/25  1120 01/06/25  1025 06/27/24  1635 06/24/24  1222 01/31/24  1305 02/11/21  1432 06/11/20  1358 03/18/20  1205 10/17/19  1242 02/14/19  1400 09/04/18  1443 03/01/18  1633   FERRITIN  --   --   --   --   --   --   --   --  255*  --  137 219* 228*   IRONSAT  --   --   --   --   --   --   --  40 NOT CALC.  --   --   --   --    TSH 1.53 1.47 2.48 0.78 0.82  --    < >  --   --   --   --  2.25  --    HGBA1C  --  8.1* 7.9*  --  8.1* 8.5*   < >  --  7.2   < > 7.0  --   --     < > = values in this interval not displayed.      CARDIAC:   Recent Labs     07/20/25  1227 06/27/24  1636 06/27/24  1635 11/04/22  1805 11/04/22  1218 11/03/22  1817 11/03/22  1437   TROPHS 13  --  7 18* 15* 17* 14*   BNP  --  321*  --   --   --   --  719*     Recent Labs     01/06/25  1025 06/24/24  1222 01/31/24  1305 05/01/23  1136 02/11/21  1432 10/17/19  1242   CHOL 144 122 148 150 144 144   LDLF  --   --   --  77 73 75   HDL 44.6 32.6 39.2 49.4 37.0* 37.1*   TRIG 132 213* 224* 117 168* 162*     MICRO: No results for input(s): \"ESR\", \"CRP\", \"PROCAL\" in the last 16484 hours.  Susceptibility data from last 90 days.  Collected Specimen Info Organism   07/20/25 Urine from Straight Catheter Klebsiella pneumoniae/variicola       Test Results:    Telemetry: Normal sinus " rhythm    Assessment/Plan:    1.  Atrial fibrillation: The patient is back in sinus rhythm and feeling well following the KIET guided cardioversion.  Metoprolol will be changed back to 25 mg twice daily.    James C Ramicone, DO           [1] apixaban, 5 mg, oral, BID  atorvastatin, 20 mg, oral, Nightly  cefTRIAXone, 1 g, intravenous, q24h  cyanocobalamin, 1,000 mcg, oral, Daily  insulin lispro, 0-10 Units, subcutaneous, Before meals & nightly  levothyroxine, 50 mcg, oral, Daily  magnesium chloride, 64 mg of elemental magnesium, oral, Daily  metoprolol tartrate, 25 mg, oral, TID  pantoprazole, 40 mg, oral, BID AC  venlafaxine XR, 150 mg, oral, Nightly

## 2025-07-22 NOTE — DOCUMENTATION CLARIFICATION NOTE
"    PATIENT:               JOVAN VALENTIN  ACCT #:                  [id number]  MRN:                       [id number]  :                       1945  ADMIT DATE:       2025 11:18 AM  DISCH DATE:  RESPONDING PROVIDER #:        [id number]          PROVIDER RESPONSE TEXT:    Patient treated for Acute Cystitis without Sepsis    CDI QUERY TEXT:    Clarification        Instruction:  Based on your assessment of the patient and the clinical information, please provide the requested documentation by clicking on the appropriate radio button and enter any additional information if prompted.    Question: Is there a diagnosis indicative of a patient meeting SIRS criteria and with organ dysfunction in the setting of Acute Cystitis    When answering this query, please exercise your independent professional judgment. The fact that a question is being asked, does not imply that any particular answer is desired or expected.    The patient's clinical indicators include:  Clinical Information: 80 y/o female presents with persistent n/v, increased reflux, and poor PO intake, dx. with acute cystitis    Clinical Indicators:    Labs:  BUN 30, Cr 2.09, WBC 14.6, 17.6, Neutrophils Absolute 10.07   Urine Cx >=100,000 CFU/mL Klebsiella pneumoniae/variicola (prelim)   Cr 1.52, WBC 12.8, Neutrophils Absolute 6.56   Cr 1.33    Vital Signs:  @ 1200 , Resp 36, /68, SPO2 97%. @ 1800 HR 87, Resp 25, /71, SPO2 98%. @  T 36.4, HR 87, Resp 21, /84   @ 0000 , Resp 27     H&P: \"She was started on Trulicity once weekly injections one month ago and her dose she injected on Thursday was an increased dose and the following day she began having GI symptoms. In addition, she has had increased dysuria over the past several days.  Assessment/Plan  Intractable n/v, secondary to GLP-1(Trulicity) dose escalation  PAF with RVR, Atrial flutter s/p RFA ablation on Eliquis  HELEN on CKD III  Acute " "cystitis\"    7/22 PN: \"Assessment/Plan:  1. Afib with RVR  2. HELEN, baseline Scr  1.0  3. Nausea and vomiting 2/2 GLP-1  4. Acute cystitis with hematuria\"    Treatment: Urine culture, monitoring labs and vital signs, IV Rocephin 1g q24h, NS IV at 100ml/hr,    Completed or D/C'd Meds: 1000ml NS IV bolus x1    Risk Factors: age, cystitis, history of diabetes, CKD  Options provided:  -- Sepsis with renal organ dysfunction of Acute Kidney Injury  -- Sepsis with other organ dysfunction, Please specify sepsis associated organ dysfunction below  -- Patient treated for Acute Cystitis without Sepsis  -- Other - I will add my own diagnosis  -- Refer to Clinical Documentation Reviewer    Query created by: Adamaris Hudson on 7/22/2025 1:58 PM      Electronically signed by:  BRII NG DO 7/22/2025 2:14 PM          "

## 2025-07-22 NOTE — CARE PLAN
The patient's goals for the shift include  rest    The clinical goals for the shift include remain HDS      Problem: Safety - Adult  Goal: Free from fall injury  Outcome: Progressing     Problem: Chronic Conditions and Co-morbidities  Goal: Patient's chronic conditions and co-morbidity symptoms are monitored and maintained or improved  Outcome: Progressing  Flowsheets (Taken 7/21/2025 2111)  Care Plan - Patient's Chronic Conditions and Co-Morbidity Symptoms are Monitored and Maintained or Improved:   Monitor and assess patient's chronic conditions and comorbid symptoms for stability, deterioration, or improvement   Collaborate with multidisciplinary team to address chronic and comorbid conditions and prevent exacerbation or deterioration   Update acute care plan with appropriate goals if chronic or comorbid symptoms are exacerbated and prevent overall improvement and discharge

## 2025-07-22 NOTE — NURSING NOTE
Stable.  Remains in sinus rhythm. Patient able to swallow.  No complaints.  Report called to RN on 8th floor.   Patient to be transferred back to 8th floor in stable condition.

## 2025-07-22 NOTE — ANESTHESIA POSTPROCEDURE EVALUATION
Patient: Marissa Rebolledo    Procedure Summary       Date: 07/22/25 Room / Location: [facility]    Anesthesia Start: 1529 Anesthesia Stop: 1541    Procedure: TRANSESOPHAGEAL ECHO (KIET) W/ POSSIBLE CARDIOVERSION Diagnosis:       Acute kidney injury      Nausea and vomiting, unspecified vomiting type      PAF (paroxysmal atrial fibrillation) (Multi)      S/P ablation operation for arrhythmia      Hypertension, unspecified type      Pure hypercholesterolemia      (Atrial Fibrillation with RVR)    Scheduled Providers: Sabi Pierre MD; Demetrio Wakefield MD Responsible Provider: Chris Gregory MD    Anesthesia Type: Not recorded ASA Status: Not recorded            Anesthesia Type: No value filed.    Vitals Value Taken Time   /68 07/22/25 15:36   Temp 35.8 07/22/25 15:41   Pulse 68 07/22/25 15:36   Resp 12 07/22/25 15:36   SpO2 99 % 07/22/25 15:36       Anesthesia Post Evaluation    Patient location during evaluation: PACU  Patient participation: complete - patient participated  Level of consciousness: awake  Pain management: adequate  Airway patency: patent  Cardiovascular status: acceptable  Respiratory status: acceptable  Hydration status: acceptable  Postoperative Nausea and Vomiting: none        No notable events documented.

## 2025-07-22 NOTE — PROGRESS NOTES
Cardiology Progress Note    Subjective   Today, patient seen and assessed. She denies any angina or heart failure symptoms.       ROS:    Review of Systems   Constitutional: Negative for malaise/fatigue.   Cardiovascular:  Negative for chest pain, dyspnea on exertion, leg swelling, orthopnea and paroxysmal nocturnal dyspnea.   Respiratory:  Negative for shortness of breath.    Neurological:  Negative for dizziness and light-headedness.        Past Medical History:  Medical History[1]    Problem List Items Addressed This Visit          Cardiac and Vasculature    S/P ablation operation for arrhythmia    Overview   Ablation for atrial fib 11/6/2024 [facility] Dr. Tucker         Relevant Medications    cholecalciferol (Vitamin D-3) 25 mcg (1,000 units) capsule    acetaminophen (Tylenol) tablet 650 mg    apixaban (Eliquis) tablet 5 mg    pantoprazole (ProtoNix) EC tablet 40 mg    glucagon (Glucagen) injection 1 mg    glucagon (Glucagen) injection 1 mg    metoprolol tartrate (Lopressor) injection 5 mg    magnesium chloride (MagDelay) 535 mg (64 mg elemental) EC tablet 1 tablet    metoprolol tartrate (Lopressor) tablet 25 mg    Other Relevant Orders    Transesophageal Echo (KIET) With Possible Cardioversion    Transesophageal Echo (KIET) With Possible Cardioversion    Hyperlipidemia    Relevant Medications    cholecalciferol (Vitamin D-3) 25 mcg (1,000 units) capsule    atorvastatin (Lipitor) tablet 20 mg    dextrose 50 % injection 25 g    dextrose 50 % injection 12.5 g    Other Relevant Orders    Transesophageal Echo (KIET) With Possible Cardioversion    Hypertension    Overview   Stable on current medicines         Relevant Medications    metoprolol tartrate (Lopressor) injection 5 mg    metoprolol tartrate (Lopressor) tablet 25 mg    Other Relevant Orders    Transesophageal Echo (KIET) With Possible Cardioversion       Genitourinary and Reproductive    * (Principal) Acute kidney injury    Relevant Medications     sodium chloride 0.9% infusion    Other Relevant Orders    Transesophageal Echo (KIET) With Possible Cardioversion     Other Visit Diagnoses         Atrial fibrillation with RVR (Multi)    -  Primary    Relevant Medications    metoprolol tartrate (Lopressor) injection 5 mg (Completed)    metoprolol tartrate (Lopressor) injection 5 mg    metoprolol tartrate (Lopressor) injection 5 mg (Completed)    metoprolol tartrate (Lopressor) tablet 25 mg    Other Relevant Orders    Transesophageal Echo (KIET) With Possible Cardioversion      Nausea and vomiting, unspecified vomiting type        Relevant Orders    Transesophageal Echo (KIET) With Possible Cardioversion      Hyperkalemia          Dehydration          PAF (paroxysmal atrial fibrillation) (Multi)        Relevant Medications    metoprolol tartrate (Lopressor) injection 5 mg (Completed)    metoprolol tartrate (Lopressor) injection 5 mg    metoprolol tartrate (Lopressor) injection 5 mg (Completed)    metoprolol tartrate (Lopressor) tablet 25 mg    Other Relevant Orders    Transesophageal Echo (KIET) With Possible Cardioversion      Persistent atrial fibrillation (Multi)        Relevant Medications    metoprolol tartrate (Lopressor) injection 5 mg (Completed)    metoprolol tartrate (Lopressor) injection 5 mg    metoprolol tartrate (Lopressor) injection 5 mg (Completed)    metoprolol tartrate (Lopressor) tablet 25 mg    Other Relevant Orders    Transesophageal Echo (KIET) With Possible Cardioversion            Family History:  No relevant family history has been documented for this patient.    Social History:  Social History     Tobacco Use    Smoking status: Former     Types: Cigarettes     Passive exposure: Past    Smokeless tobacco: Never   Substance Use Topics    Alcohol use: Not Currently         Allergies:  Allergies[2]        MEDICATION:    Scheduled medications  Scheduled Medications[3]  Continuous medications  Continuous Medications[4]  PRN medications  PRN  Medications[5]     Assessment & Plan  Acute kidney injury      OBJECTIVE:    Visit Vitals  /64 (BP Location: Left arm, Patient Position: Lying)   Pulse 83   Temp 36.4 °C (97.5 °F)   Resp 16          Intake/Output Summary (Last 24 hours) at 7/22/2025 1359  Last data filed at 7/22/2025 0041  Gross per 24 hour   Intake 1315.01 ml   Output --   Net 1315.01 ml          Physical Exam   Physical Exam  Constitutional:       Appearance: Normal appearance.   HENT:      Head: Normocephalic and atraumatic.     Cardiovascular:      Rate and Rhythm: Tachycardia present. Rhythm irregular.      Pulses: Normal pulses.      Heart sounds: Normal heart sounds, S1 normal and S2 normal. No murmur heard.  Pulmonary:      Effort: Pulmonary effort is normal.      Breath sounds: Normal breath sounds.     Musculoskeletal:         General: Normal range of motion.      Right lower leg: No edema.      Left lower leg: No edema.     Neurological:      General: No focal deficit present.      Mental Status: She is alert.     Psychiatric:         Mood and Affect: Mood normal.          Recent Image Results  ECG 12 lead  Sinus tachycardia  Low voltage QRS  Nonspecific ST abnormality  Abnormal QRS-T angle, consider primary T wave abnormality  Abnormal ECG  When compared with ECG of 18-MAR-2025 11:41,  Vent. rate has increased BY  59 BPM  Nonspecific T wave abnormality no longer evident in Anterior leads        Relevant Results:  Lab Results   Component Value Date    WBC 12.8 (H) 07/21/2025    HGB 12.6 07/21/2025    HCT 39.5 07/21/2025    MCV 89 07/21/2025     07/21/2025        Lab Results   Component Value Date    CREATININE 1.33 (H) 07/22/2025    BUN 14 07/22/2025     07/22/2025    K 4.5 07/22/2025     (H) 07/22/2025    CO2 26 07/22/2025        Assessment/Plan      Marissa Rebolledo is a 79 y.o. female known to Dr. Tucker and Dr. Vieira with a past medical history significant for forme smoker, PAF/Atrial Flutter (s/p RFA & PVI  in November of 2024 & on Eliquis), HTN, HLD, Type II DM, chronic systolic heart failure (EF previously 35% - now 61%) , and hypothyroidism.  Patient [facility] on persistent n/v/d, increased reflux, and poor PO intake since 7/18/2025. She was started on Trulicity once weekly injections one month ago and her dose she injected on Thursday was an increased dose and the following day she began having GI symptoms. Cardiology has been consulted for atrial fibrillation with RVR.     Atrial Fibrillation with RVR   - s/p RFA & PVI in November of 2024. She reports she has not had recurrence of PAF or A. Flutter since ablation to her knowledge. A. Fib RVR likely triggered by underlying electrolyte abnormalities and dehydration.  - Patient denies chest pain/pressure, shortness of breath or palpitations. She endorses generalized weakness and fatigue but notes these are improving with hydration and no recurrent vomiting overnight.   - Patient asymptomatic and remains in atrial fibrillation with heart rates 80-130s today. Planned for KIET/DCCV today. Procedure delayed this morning due to need for longer duration of NPO status. Rescheduled for later to day.    - Continue metoprolol tartrate 25 mg TID for rate control  - Continue Eliquis 5 mg BID for stroke prophylaxis.  - Recommend to keep K>4, Mg>2     Chronic Systolic and Diastolic Heart Failure   - EF Previously 35% in September of 2024. Patient underwent AF/PAF ablation In November of 2024. EF most recently 61% with grade I Diastolic dysfunction in June of 2025.   - She is maintained on ACE, BB, and Aldosterone Antagonist at home. Lisinopril and Aldactone on hold in setting of HELEN/Dehydration and to allow for further blood pressure room to treat her A. Fib RVR  - Patient appears compensated and euvolemic upon exam. Will resume home medications when blood pressure and renal status allows.  - Low sodium diet   - Daily weights  - Strict Intake and Output.     HTN   - BP:  103/64  - Home Lisinopril 40 and Aldactone 50 mg on hold     HLD  - Continue atorvastatin 20 mg daily at bedtime     HELEN   - Creatinine 2.09 upon arrival. Improved to 1.33     Discussed Case with Dr. Ramicone. Cardiology will continue to follow. Please contact with any questions or concerns.      Akila Denney, APRN-CNP          [1]   Past Medical History:  Diagnosis Date    A-fib (Multi)     Acute sinusitis, unspecified     Acute sinusitis    Anxiety disorder, unspecified 03/08/2021    Anxiety    Bleeding hemorrhoids 03/10/2023    Cellulitis, unspecified     Cellulitis    CHF (congestive heart failure)     Chronic gastric ulcer without hemorrhage or perforation     Chronic gastric ulcer    Diabetes mellitus (Multi)     Diverticulosis of intestine, part unspecified, without perforation or abscess without bleeding     Diverticulosis    Gross hematuria     Gross hematuria    Hyperlipidemia     Hypertension     Other conditions influencing health status     Generalized Anxiety Disorder    Other conditions influencing health status     Urinary Tract Infection    Other conditions influencing health status 12/11/2013    Colon Cancer    Other hemorrhoids     Internal hemorrhoids    Other malaise     Malaise    Other specified diseases of intestine     Mass of colon    Other specified epidermal thickening     Keratosis pilaris    Personal history of diseases of the blood and blood-forming organs and certain disorders involving the immune mechanism     History of iron deficiency anemia    Personal history of other diseases of the circulatory system     History of hypertension    Personal history of other diseases of the digestive system     History of esophageal reflux    Personal history of other diseases of the musculoskeletal system and connective tissue     History of low back pain    Personal history of other diseases of the musculoskeletal system and connective tissue     History of osteopenia    Personal history of  other diseases of the respiratory system     Personal history of sinusitis    Personal history of other endocrine, nutritional and metabolic disease     History of hyperlipidemia    Personal history of other infectious and parasitic diseases     History of tinea corporis    Personal history of other specified conditions     History of diarrhea    Personal history of other specified conditions     History of headache    Vitamin D deficiency, unspecified 07/02/2021    Vitamin D deficiency   [2]   Allergies  Allergen Reactions    Colchicine Other     Did not feel well with it    Metformin Headache and GI Upset     Abdominal pain    Mirtazapine Hives    Rosuvastatin Myalgia     Muscle spasm/weakness    Simvastatin Myalgia     Muscle spasms/weakness  Leg cramps     Sulfamethoxazole-Trimethoprim Hives    Jardiance [Empagliflozin] Other    Alendronic Acid Other    Clarithromycin Other     weak    Fosamax [Alendronate] Other     weak    Nitrofurantoin Monohyd/M-Cryst Other     weak    Ofloxacin Other     weakness   [3] apixaban, 5 mg, oral, BID  atorvastatin, 20 mg, oral, Nightly  cefTRIAXone, 1 g, intravenous, q24h  cyanocobalamin, 1,000 mcg, oral, Daily  insulin lispro, 0-10 Units, subcutaneous, Before meals & nightly  levothyroxine, 50 mcg, oral, Daily  magnesium chloride, 64 mg of elemental magnesium, oral, Daily  metoprolol tartrate, 25 mg, oral, TID  pantoprazole, 40 mg, oral, BID AC  venlafaxine XR, 150 mg, oral, Nightly  [4] sodium chloride 0.9%, 100 mL/hr, Last Rate: 100 mL/hr (07/21/25 2111)  [5] PRN medications: acetaminophen, dextrose, dextrose, glucagon, glucagon, metoprolol, ondansetron

## 2025-07-23 ENCOUNTER — PHARMACY VISIT (OUTPATIENT)
Dept: PHARMACY | Facility: CLINIC | Age: 80
End: 2025-07-23
Payer: COMMERCIAL

## 2025-07-23 VITALS
HEIGHT: 62 IN | SYSTOLIC BLOOD PRESSURE: 144 MMHG | WEIGHT: 165 LBS | DIASTOLIC BLOOD PRESSURE: 78 MMHG | BODY MASS INDEX: 30.36 KG/M2 | OXYGEN SATURATION: 96 % | TEMPERATURE: 97.2 F | RESPIRATION RATE: 16 BRPM | HEART RATE: 66 BPM

## 2025-07-23 LAB
ANION GAP SERPL CALC-SCNC: 10 MMOL/L (ref 10–20)
BACTERIA UR CULT: ABNORMAL
BUN SERPL-MCNC: 16 MG/DL (ref 6–23)
CALCIUM SERPL-MCNC: 8.7 MG/DL (ref 8.6–10.3)
CHLORIDE SERPL-SCNC: 106 MMOL/L (ref 98–107)
CO2 SERPL-SCNC: 26 MMOL/L (ref 21–32)
CREAT SERPL-MCNC: 1.17 MG/DL (ref 0.5–1.05)
EGFRCR SERPLBLD CKD-EPI 2021: 48 ML/MIN/1.73M*2
ERYTHROCYTE [DISTWIDTH] IN BLOOD BY AUTOMATED COUNT: 13.2 % (ref 11.5–14.5)
GLUCOSE BLD MANUAL STRIP-MCNC: 113 MG/DL (ref 74–99)
GLUCOSE SERPL-MCNC: 103 MG/DL (ref 74–99)
HCT VFR BLD AUTO: 36.3 % (ref 36–46)
HGB BLD-MCNC: 11.7 G/DL (ref 12–16)
MCH RBC QN AUTO: 28.2 PG (ref 26–34)
MCHC RBC AUTO-ENTMCNC: 32.2 G/DL (ref 32–36)
MCV RBC AUTO: 88 FL (ref 80–100)
NRBC BLD-RTO: 0 /100 WBCS (ref 0–0)
PLATELET # BLD AUTO: 157 X10*3/UL (ref 150–450)
POTASSIUM SERPL-SCNC: 4.2 MMOL/L (ref 3.5–5.3)
RBC # BLD AUTO: 4.15 X10*6/UL (ref 4–5.2)
SODIUM SERPL-SCNC: 138 MMOL/L (ref 136–145)
WBC # BLD AUTO: 8.9 X10*3/UL (ref 4.4–11.3)

## 2025-07-23 PROCEDURE — 99239 HOSP IP/OBS DSCHRG MGMT >30: CPT | Performed by: INTERNAL MEDICINE

## 2025-07-23 PROCEDURE — 82374 ASSAY BLOOD CARBON DIOXIDE: CPT | Performed by: INTERNAL MEDICINE

## 2025-07-23 PROCEDURE — 80048 BASIC METABOLIC PNL TOTAL CA: CPT | Performed by: INTERNAL MEDICINE

## 2025-07-23 PROCEDURE — 99232 SBSQ HOSP IP/OBS MODERATE 35: CPT | Performed by: INTERNAL MEDICINE

## 2025-07-23 PROCEDURE — RXMED WILLOW AMBULATORY MEDICATION CHARGE

## 2025-07-23 PROCEDURE — 99233 SBSQ HOSP IP/OBS HIGH 50: CPT | Performed by: NURSE PRACTITIONER

## 2025-07-23 PROCEDURE — 2500000001 HC RX 250 WO HCPCS SELF ADMINISTERED DRUGS (ALT 637 FOR MEDICARE OP): Performed by: NURSE PRACTITIONER

## 2025-07-23 PROCEDURE — 85027 COMPLETE CBC AUTOMATED: CPT | Performed by: INTERNAL MEDICINE

## 2025-07-23 PROCEDURE — 36415 COLL VENOUS BLD VENIPUNCTURE: CPT | Performed by: INTERNAL MEDICINE

## 2025-07-23 PROCEDURE — 2500000002 HC RX 250 W HCPCS SELF ADMINISTERED DRUGS (ALT 637 FOR MEDICARE OP, ALT 636 FOR OP/ED): Performed by: STUDENT IN AN ORGANIZED HEALTH CARE EDUCATION/TRAINING PROGRAM

## 2025-07-23 PROCEDURE — 2500000004 HC RX 250 GENERAL PHARMACY W/ HCPCS (ALT 636 FOR OP/ED): Performed by: STUDENT IN AN ORGANIZED HEALTH CARE EDUCATION/TRAINING PROGRAM

## 2025-07-23 PROCEDURE — 2500000001 HC RX 250 WO HCPCS SELF ADMINISTERED DRUGS (ALT 637 FOR MEDICARE OP): Performed by: STUDENT IN AN ORGANIZED HEALTH CARE EDUCATION/TRAINING PROGRAM

## 2025-07-23 PROCEDURE — 2500000001 HC RX 250 WO HCPCS SELF ADMINISTERED DRUGS (ALT 637 FOR MEDICARE OP): Performed by: INTERNAL MEDICINE

## 2025-07-23 PROCEDURE — 82947 ASSAY GLUCOSE BLOOD QUANT: CPT

## 2025-07-23 RX ORDER — METOPROLOL TARTRATE 25 MG/1
25 TABLET, FILM COATED ORAL 2 TIMES DAILY
Qty: 60 TABLET | Refills: 0 | Status: SHIPPED | OUTPATIENT
Start: 2025-07-23 | End: 2025-07-23 | Stop reason: HOSPADM

## 2025-07-23 RX ORDER — CEPHALEXIN 500 MG/1
500 CAPSULE ORAL 2 TIMES DAILY
Qty: 8 CAPSULE | Refills: 0 | Status: SHIPPED | OUTPATIENT
Start: 2025-07-23 | End: 2025-07-27

## 2025-07-23 RX ORDER — METOPROLOL SUCCINATE 25 MG/1
12.5 TABLET, EXTENDED RELEASE ORAL 2 TIMES DAILY
Status: DISCONTINUED | OUTPATIENT
Start: 2025-07-23 | End: 2025-07-23 | Stop reason: HOSPADM

## 2025-07-23 RX ORDER — LISINOPRIL 10 MG/1
10 TABLET ORAL DAILY
Status: DISCONTINUED | OUTPATIENT
Start: 2025-07-23 | End: 2025-07-23 | Stop reason: HOSPADM

## 2025-07-23 RX ORDER — LISINOPRIL 10 MG/1
10 TABLET ORAL DAILY
Qty: 30 TABLET | Refills: 0 | Status: SHIPPED | OUTPATIENT
Start: 2025-07-24 | End: 2025-08-23

## 2025-07-23 RX ADMIN — CEFTRIAXONE 1 G: 1 INJECTION, SOLUTION INTRAVENOUS at 00:35

## 2025-07-23 RX ADMIN — APIXABAN 5 MG: 5 TABLET, FILM COATED ORAL at 08:56

## 2025-07-23 RX ADMIN — LEVOTHYROXINE SODIUM 50 MCG: 0.05 TABLET ORAL at 06:36

## 2025-07-23 RX ADMIN — METOPROLOL TARTRATE 25 MG: 25 TABLET, FILM COATED ORAL at 08:56

## 2025-07-23 RX ADMIN — CYANOCOBALAMIN TAB 1000 MCG 1000 MCG: 1000 TAB at 08:56

## 2025-07-23 RX ADMIN — PANTOPRAZOLE SODIUM 40 MG: 40 TABLET, DELAYED RELEASE ORAL at 06:36

## 2025-07-23 RX ADMIN — MAGNESIUM 64 MG (MAGNESIUM CHLORIDE) TABLET,DELAYED RELEASE 1 TABLET: at 08:56

## 2025-07-23 RX ADMIN — LISINOPRIL 10 MG: 10 TABLET ORAL at 13:16

## 2025-07-23 ASSESSMENT — ENCOUNTER SYMPTOMS
NEAR-SYNCOPE: 0
PND: 0
WEAKNESS: 0
ORTHOPNEA: 0
SHORTNESS OF BREATH: 0
DIZZINESS: 0
DYSPNEA ON EXERTION: 0
LIGHT-HEADEDNESS: 0
PALPITATIONS: 0

## 2025-07-23 ASSESSMENT — PAIN - FUNCTIONAL ASSESSMENT: PAIN_FUNCTIONAL_ASSESSMENT: 0-10

## 2025-07-23 ASSESSMENT — PAIN SCALES - GENERAL: PAINLEVEL_OUTOF10: 0 - NO PAIN

## 2025-07-23 NOTE — POST-PROCEDURE NOTE
Physician Transition of Care Summary  Invasive Cardiovascular Lab    Procedure Date: 7/22/2025  Attending: * No surgeons found in log *  Resident/Fellow/Other Assistant: * No surgeons found in log *    Indications:   * No Diagnosis Codes entered *    Post-procedure diagnosis:   * No Diagnosis Codes entered *    Procedure(s):   * No procedures documented on diagnosis form *      Procedure Findings:   Cardioversion    Description of the Procedure:   After the KIET excluded a left atrial thrombus, patient was brought to the Cath Lab.  Anesthesiology provided sedation.  Once the patient was sedated, 100 J of synchronized energy was delivered via anterior posterior patches with successful conversion of atrial fibrillation to normal sinus rhythm.  When the patient awoke she was neurologically intact.    Conclusions    1.  Successful KIET guided electrical cardioversion of atrial fibrillation to sinus rhythm utilizing 100 J of synchronized energy.    Complications:   None    Stents/Implants:   Implants       No implant documentation for this case.            Anticoagulation/Antiplatelet Plan:   Continue anticoagulation    Estimated Blood Loss:   0 mL    Anesthesia: * No anesthesia type entered * Anesthesia Staff: Anesthesiologist: Chris Gregory MD  C-AA: LUKE Bermudez    Any Specimen(s) Removed:   No specimens collected during this procedure.    Disposition:   Discharge to the floor      Electronically signed by: Sabi Pierre MD, 7/22/2025 11:30 PM

## 2025-07-23 NOTE — CARE PLAN
The clinical goals for the shift include Maintain safety and comfort      Problem: Safety - Adult  Goal: Free from fall injury  Outcome: Progressing     Problem: Diabetes  Goal: Achieve decreasing blood glucose levels by end of shift  Outcome: Progressing  Goal: Increase stability of blood glucose readings by end of shift  Outcome: Progressing  Goal: Maintain electrolyte levels within acceptable range throughout shift  Outcome: Progressing     Problem: Fall/Injury  Goal: Not fall by end of shift  Outcome: Progressing  Goal: Be free from injury by end of the shift  Outcome: Progressing

## 2025-07-23 NOTE — PROGRESS NOTES
"Marissa Rebolledo is a 79 y.o. female on day 3 of admission presenting with Acute kidney injury.            HPI:    The patient is feeling much better today following the KIET guided cardioversion.  She was seen in conjunction with CELIA Ugarte.  Please refer to her dictated progress note for details.    Physical Exam:    General Appearance:  Alert, oriented, no distress  Skin:  Warm and dry  Head and Neck:  No elevation of JVP, no carotid bruits  Cardiac Exam:  Rhythm is regular, S1 and S2 are normal, no murmur S3 or S4  Lungs:  Clear to auscultation  Extremities:  no edema  Neurologic:  No focal deficits  Psychiatric:  Appropriate mood and behavior     Last Recorded Vitals  Blood pressure 144/78, pulse 66, temperature 36.2 °C (97.2 °F), resp. rate 16, height 1.575 m (5' 2\"), weight 74.8 kg (165 lb), SpO2 96%.  Intake/Output last 3 Shifts:  I/O last 3 completed shifts:  In: 480 (6.4 mL/kg) [I.V.:380 (5.1 mL/kg); IV Piggyback:100]  Out: 0 (0 mL/kg)   Weight: 74.8 kg     Medications:  Scheduled medications  Scheduled Medications[1]    Labs:    CMP:  Recent Labs     07/23/25  0523 07/22/25  0536 07/21/25  0929 07/20/25  2353 07/20/25  1227 05/12/25  1120 01/06/25  1025 10/28/24  1323 06/27/24  2048 06/27/24  1635 01/16/23  1412 11/05/22  0609    139 136 136 134* 137 141 141  --  136   < > 140   K 4.2 4.5 4.6 4.6 5.9* 5.6* 4.8 4.9   < > 5.7*   < > 3.9    109* 104 105 99 101 103 103  --  102   < > 104   CO2 26 26 24 20* 22 27 30 28  --  26   < > 26   ANIONGAP 10 9* 13 16 19 9 13 15  --  14   < > 14   BUN 16 14 22 26* 30* 15 14 13  --  12   < > 15   CREATININE 1.17* 1.33* 1.52* 1.50* 2.09* 1.10* 1.02 1.07*  --  0.91   < > 0.90   EGFR 48* 41* 35* 36* 24* 51* 56* 54*  --  65   < >  --    MG  --  1.86 1.92 1.44* 1.54*  --   --   --   --  1.71  --  1.83    < > = values in this interval not displayed.     Recent Labs     07/20/25  1227 05/12/25  1120 01/06/25  1025 06/27/24  1635 06/24/24  1222   ALBUMIN " "4.9 4.6 4.0 4.3 4.1   ALKPHOS 101 76 80 68 69   ALT 13 15 28 11 12   AST 17 22 25 37 16   BILITOT 0.5 0.4 0.4 0.4 0.7   LIPASE 24  --   --   --   --      CBC:  Recent Labs     07/23/25  0523 07/21/25  0929 07/20/25  2353 07/20/25  1227 05/12/25  1120 01/06/25  1025 10/28/24  1323 06/27/24  1635   WBC 8.9 12.8* 17.6* 14.6* 8.3 7.1 8.4 8.3   HGB 11.7* 12.6 13.4 15.7 13.3 12.9 13.2 12.7   HCT 36.3 39.5 40.7 48.0* 41.0 40.3 43.4 40.0    184 178 245 234 198 246 189   MCV 88 89 88 86 88.0 86 83 84     COAG:   Recent Labs     10/28/24  1323 06/27/24  1635 02/15/23  0627   INR 1.7* 1.5* 1.3*     ABO:   Recent Labs     10/28/24  1323   ABO AB     HEME/ENDO:  Recent Labs     07/21/25  0929 05/12/25  1120 01/06/25  1025 06/27/24  1635 06/24/24  1222 01/31/24  1305 02/11/21  1432 06/11/20  1358 03/18/20  1205 10/17/19  1242 02/14/19  1400 09/04/18  1443 03/01/18  1633   FERRITIN  --   --   --   --   --   --   --   --  255*  --  137 219* 228*   IRONSAT  --   --   --   --   --   --   --  40 NOT CALC.  --   --   --   --    TSH 1.53 1.47 2.48 0.78 0.82  --    < >  --   --   --   --  2.25  --    HGBA1C  --  8.1* 7.9*  --  8.1* 8.5*   < >  --  7.2   < > 7.0  --   --     < > = values in this interval not displayed.      CARDIAC:   Recent Labs     07/20/25  1227 06/27/24  1636 06/27/24  1635 11/04/22  1805 11/04/22  1218 11/03/22  1817 11/03/22  1437   TROPHS 13  --  7 18* 15* 17* 14*   BNP  --  321*  --   --   --   --  719*     Recent Labs     01/06/25  1025 06/24/24  1222 01/31/24  1305 05/01/23  1136 02/11/21  1432 10/17/19  1242   CHOL 144 122 148 150 144 144   LDLF  --   --   --  77 73 75   HDL 44.6 32.6 39.2 49.4 37.0* 37.1*   TRIG 132 213* 224* 117 168* 162*     MICRO: No results for input(s): \"ESR\", \"CRP\", \"PROCAL\" in the last 41721 hours.  Susceptibility data from last 90 days.  Collected Specimen Info Organism Amoxicillin/Clavulanate Ampicillin Ampicillin/Sulbactam Cefazolin Cefazolin (uncomplicated UTIs only) " Ciprofloxacin Gentamicin Levofloxacin Nitrofurantoin Piperacillin/Tazobactam   07/20/25 Urine from Straight Catheter Klebsiella pneumoniae/variicola  S  R  S  S  S  S  S  S  S  S     Collected Specimen Info Organism Trimethoprim/Sulfamethoxazole   07/20/25 Urine from Straight Catheter Klebsiella pneumoniae/variicola  S       Test Results:    Telemetry: Normal sinus rhythm    Assessment/Plan:    1.  Atrial fibrillation: The patient is maintaining sinus rhythm following the KIET guided cardioversion yesterday.  She will be placed back on her usual metoprolol dosage that she takes as an outpatient.  She will follow-up with Dr. Tucker as scheduled, and is cleared for discharge from cardiac perspective.    James C Ramicone, DO             [1] apixaban, 5 mg, oral, BID  atorvastatin, 20 mg, oral, Nightly  cefTRIAXone, 1 g, intravenous, q24h  cyanocobalamin, 1,000 mcg, oral, Daily  insulin lispro, 0-10 Units, subcutaneous, Before meals & nightly  levothyroxine, 50 mcg, oral, Daily  lisinopril, 10 mg, oral, Daily  magnesium chloride, 64 mg of elemental magnesium, oral, Daily  metoprolol succinate XL, 12.5 mg, oral, BID  pantoprazole, 40 mg, oral, BID AC  venlafaxine XR, 150 mg, oral, Nightly

## 2025-07-23 NOTE — CARE PLAN
Problem: Safety - Adult  Goal: Free from fall injury  Outcome: Progressing     Problem: Discharge Planning  Goal: Discharge to home or other facility with appropriate resources  Outcome: Progressing     Problem: Chronic Conditions and Co-morbidities  Goal: Patient's chronic conditions and co-morbidity symptoms are monitored and maintained or improved  Outcome: Progressing     Problem: Nutrition  Goal: Nutrient intake appropriate for maintaining nutritional needs  Outcome: Progressing     Problem: Diabetes  Goal: Achieve decreasing blood glucose levels by end of shift  Outcome: Progressing  Goal: Increase stability of blood glucose readings by end of shift  Outcome: Progressing  Goal: Maintain electrolyte levels within acceptable range throughout shift  Outcome: Progressing  Goal: Maintain glucose levels >70mg/dl to <250mg/dl throughout shift  Outcome: Progressing  Goal: No changes in neurological exam by end of shift  Outcome: Progressing  Goal: Vital signs within normal range for age by end of shift  Outcome: Progressing     Problem: Pain  Goal: Takes deep breaths with improved pain control throughout the shift  Outcome: Progressing  Goal: Turns in bed with improved pain control throughout the shift  Outcome: Progressing  Goal: Performs ADL's with improved pain control throughout shift  Outcome: Progressing  Goal: Participates in PT with improved pain control throughout the shift  Outcome: Progressing     Problem: Fall/Injury  Goal: Not fall by end of shift  Outcome: Progressing  Goal: Be free from injury by end of the shift  Outcome: Progressing

## 2025-07-23 NOTE — PROGRESS NOTES
Cardiology Progress Note      Subjective   Today, patient seen and assessed resting comfortably in bed. Denies any fatigue, weakness, palpitations, shortness of breath, or chest pain since DCCV yesterday. Remains in NSR overnight.       ROS:    Review of Systems   Constitutional: Negative for malaise/fatigue.   Cardiovascular:  Negative for chest pain, dyspnea on exertion, near-syncope, orthopnea, palpitations and paroxysmal nocturnal dyspnea.   Respiratory:  Negative for shortness of breath.    Neurological:  Negative for dizziness, light-headedness and weakness.        Past Medical History:  Medical History[1]    Problem List Items Addressed This Visit          Cardiac and Vasculature    S/P ablation operation for arrhythmia    Overview   Ablation for atrial fib 11/6/2024 [facility] Dr. Tucker         Relevant Medications    cholecalciferol (Vitamin D-3) 25 mcg (1,000 units) capsule    acetaminophen (Tylenol) tablet 650 mg    apixaban (Eliquis) tablet 5 mg    pantoprazole (ProtoNix) EC tablet 40 mg    glucagon (Glucagen) injection 1 mg    glucagon (Glucagen) injection 1 mg    metoprolol tartrate (Lopressor) injection 5 mg    magnesium chloride (MagDelay) 535 mg (64 mg elemental) EC tablet 1 tablet    metoprolol tartrate (Lopressor) tablet 25 mg    Other Relevant Orders    Transesophageal Echo (KIET) With Possible Cardioversion (Completed)    Hyperlipidemia    Relevant Medications    cholecalciferol (Vitamin D-3) 25 mcg (1,000 units) capsule    atorvastatin (Lipitor) tablet 20 mg    dextrose 50 % injection 25 g    dextrose 50 % injection 12.5 g    Other Relevant Orders    Transesophageal Echo (KIET) With Possible Cardioversion (Completed)    Hypertension    Overview   Stable on current medicines         Relevant Medications    metoprolol tartrate (Lopressor) injection 5 mg    metoprolol tartrate (Lopressor) tablet 25 mg    Other Relevant Orders    Transesophageal Echo (KIET) With Possible Cardioversion  (Completed)       Genitourinary and Reproductive    * (Principal) Acute kidney injury    Relevant Orders    Transesophageal Echo (KIET) With Possible Cardioversion (Completed)     Other Visit Diagnoses         Atrial fibrillation with RVR (Multi)    -  Primary    Relevant Medications    metoprolol tartrate (Lopressor) injection 5 mg (Completed)    metoprolol tartrate (Lopressor) injection 5 mg    metoprolol tartrate (Lopressor) injection 5 mg (Completed)    metoprolol tartrate (Lopressor) tablet 25 mg      Nausea and vomiting, unspecified vomiting type        Relevant Orders    Transesophageal Echo (KIET) With Possible Cardioversion (Completed)      Hyperkalemia          Dehydration          PAF (paroxysmal atrial fibrillation) (Multi)        Relevant Medications    metoprolol tartrate (Lopressor) injection 5 mg (Completed)    metoprolol tartrate (Lopressor) injection 5 mg    metoprolol tartrate (Lopressor) injection 5 mg (Completed)    metoprolol tartrate (Lopressor) tablet 25 mg    Other Relevant Orders    Transesophageal Echo (KIET) With Possible Cardioversion (Completed)      Persistent atrial fibrillation (Multi)        Relevant Medications    metoprolol tartrate (Lopressor) injection 5 mg (Completed)    metoprolol tartrate (Lopressor) injection 5 mg    metoprolol tartrate (Lopressor) injection 5 mg (Completed)    metoprolol tartrate (Lopressor) tablet 25 mg            Family History:  No relevant family history has been documented for this patient.    Social History:  Social History     Tobacco Use    Smoking status: Former     Types: Cigarettes     Passive exposure: Past    Smokeless tobacco: Never   Substance Use Topics    Alcohol use: Not Currently         Allergies:  Allergies[2]        MEDICATION:    Scheduled medications  Scheduled Medications[3]  Continuous medications  Continuous Medications[4]  PRN medications  PRN Medications[5]     Assessment & Plan  Acute kidney injury      OBJECTIVE:    Visit  Vitals  /78   Pulse 66   Temp 36.2 °C (97.2 °F)   Resp 16          Intake/Output Summary (Last 24 hours) at 7/23/2025 1225  Last data filed at 7/23/2025 0105  Gross per 24 hour   Intake 50 ml   Output 0 ml   Net 50 ml          Physical Exam   Physical Exam  Constitutional:       Appearance: Normal appearance.   HENT:      Head: Normocephalic and atraumatic.     Cardiovascular:      Rate and Rhythm: Normal rate and regular rhythm.      Pulses: Normal pulses.      Heart sounds: Normal heart sounds, S1 normal and S2 normal. No murmur heard.  Pulmonary:      Effort: Pulmonary effort is normal.      Breath sounds: Normal breath sounds.     Musculoskeletal:      Cervical back: Normal range of motion and neck supple.      Right lower leg: No edema.      Left lower leg: No edema.     Skin:     General: Skin is warm and dry.     Neurological:      General: No focal deficit present.      Mental Status: She is alert. Mental status is at baseline.     Psychiatric:         Mood and Affect: Mood normal.          Recent Image Results  ECG 12 lead  Sinus tachycardia  Low voltage QRS  Nonspecific ST abnormality  Abnormal QRS-T angle, consider primary T wave abnormality  Abnormal ECG  When compared with ECG of 18-MAR-2025 11:41,  Vent. rate has increased BY  59 BPM  Nonspecific T wave abnormality no longer evident in Anterior leads        Relevant Results:  Lab Results   Component Value Date    WBC 8.9 07/23/2025    HGB 11.7 (L) 07/23/2025    HCT 36.3 07/23/2025    MCV 88 07/23/2025     07/23/2025        Lab Results   Component Value Date    CREATININE 1.17 (H) 07/23/2025    BUN 16 07/23/2025     07/23/2025    K 4.2 07/23/2025     07/23/2025    CO2 26 07/23/2025        Assessment/Plan    Marissa Rebolledo is a 79 y.o. female known to Dr. Tucker and Dr. Vieira with a past medical history significant for forme smoker, PAF/Atrial Flutter (s/p RFA & PVI in November of 2024 & on Eliquis), HTN, HLD, Type II DM,  chronic systolic heart failure (EF previously 35% - now 61%) , and hypothyroidism.  Patient presented to [facility] on persistent n/v/d, increased reflux, and poor PO intake since 7/18/2025. She was started on Trulicity once weekly injections one month ago and her dose she injected on Thursday was an increased dose and the following day she began having GI symptoms. Cardiology has been consulted for atrial fibrillation with RVR.      Atrial Fibrillation with RVR   - s/p RFA & PVI in November of 2024. Unfortunately she developed A. Fib RVR likely triggered by underlying electrolyte abnormalities and dehydration. She underwent KIET guided DCCV yesterday. She feels much better today and remains in NSR.   - Resume home dose BB 12.5 mg Toprol XL twice daily  - Continue Eliquis 5 mg BID for stroke prophylaxis.  - Recommend to keep K>4, Mg>2     Chronic Systolic and Diastolic Heart Failure   - EF Previously 35% in September of 2024. Patient underwent AF/PAF ablation In November of 2024. EF most recently 61% with grade I Diastolic dysfunction in June of 2025.   - Patient appears compensated and euvolemic upon exam. Denies any heart failure symptoms.   - She is maintained on ACE, BB, and Aldosterone Antagonist at home. Lisinopril 40 mg and Aldactone 50 mg have been on hold in setting of HELEN/Dehydration. Renal function improving will add on Lisinopril 10 mg daily. Can titrate as needed as an outpatient and consider resuming Aldactone as an outpatient as well.  - Low sodium diet   - Daily weights  - Strict Intake and Output.     HTN   - BP: 144/78  - Start Lisinopril 10 mg daily. Will need close monitoring of renal function as an outpatient.  - Resume home dose BB 12.5 mg Toprol XL twice daily     HLD  - Continue atorvastatin 20 mg daily at bedtime     HELEN   - Creatinine 2.09 upon arrival. Improved to 1.17    Discussed Case with Dr. Ramicone. Cardiology will sign off. Patient is stable from a cardiac perspective for discharge.  Please contact with any questions or concerns.      Akila Denney, APRN-CNP            [1]   Past Medical History:  Diagnosis Date    A-fib (Multi)     Acute sinusitis, unspecified     Acute sinusitis    Anxiety disorder, unspecified 03/08/2021    Anxiety    Bleeding hemorrhoids 03/10/2023    Cellulitis, unspecified     Cellulitis    CHF (congestive heart failure)     Chronic gastric ulcer without hemorrhage or perforation     Chronic gastric ulcer    Diabetes mellitus (Multi)     Diverticulosis of intestine, part unspecified, without perforation or abscess without bleeding     Diverticulosis    Gross hematuria     Gross hematuria    Hyperlipidemia     Hypertension     Other conditions influencing health status     Generalized Anxiety Disorder    Other conditions influencing health status     Urinary Tract Infection    Other conditions influencing health status 12/11/2013    Colon Cancer    Other hemorrhoids     Internal hemorrhoids    Other malaise     Malaise    Other specified diseases of intestine     Mass of colon    Other specified epidermal thickening     Keratosis pilaris    Personal history of diseases of the blood and blood-forming organs and certain disorders involving the immune mechanism     History of iron deficiency anemia    Personal history of other diseases of the circulatory system     History of hypertension    Personal history of other diseases of the digestive system     History of esophageal reflux    Personal history of other diseases of the musculoskeletal system and connective tissue     History of low back pain    Personal history of other diseases of the musculoskeletal system and connective tissue     History of osteopenia    Personal history of other diseases of the respiratory system     Personal history of sinusitis    Personal history of other endocrine, nutritional and metabolic disease     History of hyperlipidemia    Personal history of other infectious and parasitic diseases      History of tinea corporis    Personal history of other specified conditions     History of diarrhea    Personal history of other specified conditions     History of headache    Vitamin D deficiency, unspecified 07/02/2021    Vitamin D deficiency   [2]   Allergies  Allergen Reactions    Colchicine Other     Did not feel well with it    Metformin Headache and GI Upset     Abdominal pain    Mirtazapine Hives    Rosuvastatin Myalgia     Muscle spasm/weakness    Simvastatin Myalgia     Muscle spasms/weakness  Leg cramps     Sulfamethoxazole-Trimethoprim Hives    Jardiance [Empagliflozin] Other    Alendronic Acid Other    Clarithromycin Other     weak    Fosamax [Alendronate] Other     weak    Nitrofurantoin Monohyd/M-Cryst Other     weak    Ofloxacin Other     weakness   [3] apixaban, 5 mg, oral, BID  atorvastatin, 20 mg, oral, Nightly  cefTRIAXone, 1 g, intravenous, q24h  cyanocobalamin, 1,000 mcg, oral, Daily  insulin lispro, 0-10 Units, subcutaneous, Before meals & nightly  levothyroxine, 50 mcg, oral, Daily  magnesium chloride, 64 mg of elemental magnesium, oral, Daily  metoprolol tartrate, 25 mg, oral, BID  pantoprazole, 40 mg, oral, BID AC  venlafaxine XR, 150 mg, oral, Nightly  [4]    [5] PRN medications: acetaminophen, dextrose, dextrose, glucagon, glucagon, metoprolol, ondansetron

## 2025-07-23 NOTE — DISCHARGE SUMMARY
"                                                                 Hospital Medicine Discharge Summary       Patient Name: Marissa Rebolledo   YOB: 1945    Discharge Diagnosis: Acute kidney injury   Discharge Date: 07/23/25  Discharge Location: Home    Hospital Course:    78 yo F with PMHx PAF, Atrial flutter s/p RFA on Eliquis, HTN, HLD, DM II, hypothyroidism, osteoporosis, and GERD presents with persistent n/v, increased reflux, and poor PO intake since this past Friday. She was started on Trulicity once weekly injections one month ago and her dose she injected on Thursday was an increased dose and the following day she began having GI symptoms. In addition, she has had increased dysuria over the past several days.     Seen by EP and underwent KIET Guided DCCV for her afib. Rates controlled now. BP meds adjusted. HELEN improved. N/V symptoms resolved. DC home to finish course of abx for UTI. Has EP follow up in about 3 weeks. Wants to see endo for her sugars, name and number provided for our local endocrinologist who is private so no referral was placed.     Time Spent: 38 minutes      Physical Exam:     /78   Pulse 66   Temp 36.2 °C (97.2 °F)   Resp 16   Ht 1.575 m (5' 2\")   Wt 74.8 kg (165 lb)   LMP  (LMP Unknown)   SpO2 96%   BMI 30.18 kg/m²     GENERAL: well developed, non-toxic, NAD, alert & cooperative  HEENT: normocephalic, atraumatic, sclera clear  CARDIAC: regular rate & rhythm, S1/S2  PULMONARY: CTA b/l, no respiratory distress, - wheezes  ABDOMEN: soft, non-tender, non-distended  MSK: no peripheral edema, no obvious deformity  NEURO: A&O X 3, non-focal, CN II-XII grossly intact  SKIN: Warm and dry, no lesions, no rashes.  PSYCH: appropriate mood & affect      Discharge Medications:     Your medication list        PAUSE taking these medications        Instructions Last Dose Given Next Dose Due   lisinopril 40 mg tablet  Wait to take this until your doctor or other care provider " tells you to start again.  What changed: when to take this      TAKE 1 TABLET(40 MG) BY MOUTH EVERY DAY  You also have another medication with the same name that you may need to continue taking.       Trulicity 1.5 mg/0.5 mL pen injector injection  Wait to take this until your doctor or other care provider tells you to start again.  Generic drug: dulaglutide  What changed: additional instructions      Inject 1.5 mg under the skin 1 (one) time per week.              START taking these medications        Instructions Last Dose Given Next Dose Due   cephalexin 500 mg capsule  Commonly known as: Keflex      Take 1 capsule (500 mg) by mouth 2 times a day for 4 days.       metoprolol tartrate 25 mg tablet  Commonly known as: Lopressor      Take 1 tablet (25 mg) by mouth 2 times a day.              CHANGE how you take these medications        Instructions Last Dose Given Next Dose Due   atorvastatin 20 mg tablet  Commonly known as: Lipitor  What changed: when to take this      Take 1 tablet (20 mg) by mouth once daily.       insulin lispro 100 unit/mL pen  Commonly known as: HumaLOG KwikPen Insulin  What changed:   how much to take  how to take this  when to take this  additional instructions      Inject 3 times daily using sliding scale: 0-150 no units, 151-200 2 units, 201-250 4 units, 251-300 6 units, 301-350 8 units, 351-400 10 units, above 400 call Dr. Ben Bishop 30 units daily       lisinopril 10 mg tablet  Start taking on: July 24, 2025  What changed: Another medication with the same name was paused. Ask your nurse or doctor if you should take this medication.      Take 1 tablet (10 mg) by mouth once daily.       spironolactone 50 mg tablet  Commonly known as: Aldactone  What changed: when to take this      Take 1 tablet (50 mg) by mouth once daily.              CONTINUE taking these medications        Instructions Last Dose Given Next Dose Due   albuterol 90 mcg/actuation inhaler      Inhale 1-2 puffs every 6 hours  if needed for wheezing.       cholecalciferol 25 mcg (1,000 units) capsule  Commonly known as: Vitamin D-3           cyanocobalamin 1,000 mcg tablet  Commonly known as: Vitamin B-12           Dexcom G7  misc  Generic drug: blood-glucose,,cont      Use as instructed       FreeStyle Aneesh 3 [place] misc  Generic drug: blood-glucose,,cont      Use as instructed       Dexcom G7 Sensor device  Generic drug: blood-glucose sensor      Replace every 10 days       FreeStyle Aneesh 3 Plus Sensor device  Generic drug: blood-glucose sensor      1 each 2 times a day. Replace every 15 days       Eliquis 5 mg tablet  Generic drug: apixaban      Take 1 tablet (5 mg) by mouth 2 times a day.       esomeprazole 40 mg DR capsule  Commonly known as: NexIUM      Take 1 capsule (40 mg) by mouth once daily in the morning. Take before meals.       glipiZIDE 5 mg tablet  Commonly known as: Glucotrol      Take 2 tablets (10 mg) by mouth 2 times a day before meals.       levothyroxine 50 mcg tablet  Commonly known as: Synthroid, Levoxyl      Take 1 tablet (50 mcg) by mouth once daily.       metFORMIN  mg 24 hr tablet  Commonly known as: Glucophage-XR      Take 2 tablets (1,000 mg) by mouth 2 times daily (morning and late afternoon).       venlafaxine  mg 24 hr capsule  Commonly known as: Effexor-XR      Take 1 capsule (150 mg) by mouth once daily at bedtime.       venlafaxine XR 75 mg 24 hr capsule  Commonly known as: Effexor-XR      Take 1 capsule (75 mg) by mouth once daily in the morning. Take with food.              STOP taking these medications      metoprolol succinate XL 25 mg 24 hr tablet  Commonly known as: Toprol-XL                  Where to Get Your Medications        These medications were sent to [facility]  [address] [place] [address]      Hours: 8:30 AM to 5:00 PM Mon - Fri Phone: [phone]   cephalexin 500 mg capsule  lisinopril 10 mg tablet  metoprolol tartrate 25  mg tablet               Sameer Ascencio, [facility]

## 2025-07-24 ENCOUNTER — PATIENT OUTREACH (OUTPATIENT)
Dept: PRIMARY CARE | Facility: CLINIC | Age: 80
End: 2025-07-24
Payer: MEDICARE

## 2025-07-24 NOTE — PROGRESS NOTES
Discharge Facility: [facility]  Discharge Diagnosis: Atrial fibrillation with RVR  Admission Date: 7/20/2025  Discharge Date: 7/23/2025    PCP Appointment Date:  -Unable to schedule d/t no available appt; task sent to office clerical pool to assist with follow up    Specialist Appointment Date:   -endocrinology Dr. Reyes; to be scheduled  -7/30/2025 1300 virtual pharmacy  -8/19/2025 1445 cardiology    Hospital Encounter and Summary Linked: Yes    ED to Hosp-Admission (Discharged) with Sameer Ascencio DO (07/20/2025)     See discharge assessment below for further details    Wrap Up  Wrap Up Additional Comments: Pt was admitted to [facility] 7/20-7/23/2025 for atrial fibrillation with RVR. Pt reports feeling a little better but not 100%. Pt discharged with prescriptions for keflex and lisinopril; pt denies any questions or issues. Pt states she was having diarrhea in the hospital and reports loose bowel movements at home. Pt reports understanding of discharge instructions. Pt mentions that she was having heart burn, nausea and vomiting before and in the hospital but it has improved a little. Pt reports intermittent nausea and states she has been burping a lot. Pt states she was getting zofran in the hospital and feels it would help her a lot at home. Message sent to PCP and office clinical staff. Pt is interested in discussing nexium dose with PCP. Unable to schedule PCP follow up d/t no available appt; task sent to office clerical pool to assist with scheduling. Pt to schedule follow up with endocrinologist Dr. Reyes. Verified pt upcoming pharmacy appt 7/30/2025 1300. Pt denies any other questions or needs at this time. She is encouraged to call if questions or needs arise. (7/24/2025  9:37 AM)  Call End Time: 0956 (7/24/2025  9:37 AM)    Engagement  Call Start Time: 0934 (7/24/2025  9:37 AM)    Medications  Medications reviewed with patient/caregiver?: Yes (new prescription reviewed; keflex  and lisinopril) (7/24/2025  9:37 AM)  Is the patient having any side effects they believe may be caused by any medication additions or changes?: -- (pt reports she was having diarrhea and nausea when she had gone to the hospital. she states it stopped in the hospital but she has had 2 loose bowel movements this morning; update sent to PCP) (7/24/2025  9:37 AM)  Does the patient have all medications ordered at discharge?: Yes (7/24/2025  9:37 AM)  Care Management Interventions: No intervention needed (7/24/2025  9:37 AM)  Is the patient taking all medications as directed (includes completed medication regime)?: Yes (7/24/2025  9:37 AM)  Care Management Interventions: Provided patient education (7/24/2025  9:37 AM)    Appointments  Does the patient have a primary care provider?: Yes (7/24/2025  9:37 AM)  Care Management Interventions: Advised patient to make appointment (7/24/2025  9:37 AM)  Has the patient kept scheduled appointments due by today?: Yes (7/24/2025  9:37 AM)    Self Management  Has home health visited the patient within 72 hours of discharge?: Not applicable (7/24/2025  9:37 AM)    Patient Teaching  Does the patient have access to their discharge instructions?: Yes (7/24/2025  9:37 AM)  Care Management Interventions: Reviewed instructions with patient (7/24/2025  9:37 AM)  What is the patient's perception of their health status since discharge?: Improving (7/24/2025  9:37 AM)  Is the patient/caregiver able to teach back the hierarchy of who to call/visit for symptoms/problems? PCP, Specialist, Home Health nurse, Urgent Care, ED, 911: Yes (7/24/2025  9:37 AM)

## 2025-07-30 ENCOUNTER — APPOINTMENT (OUTPATIENT)
Dept: PHARMACY | Facility: HOSPITAL | Age: 80
End: 2025-07-30
Payer: MEDICARE

## 2025-07-30 DIAGNOSIS — E11.9 TYPE 2 DIABETES MELLITUS WITHOUT COMPLICATION, WITHOUT LONG-TERM CURRENT USE OF INSULIN: Primary | ICD-10-CM

## 2025-08-01 NOTE — PROGRESS NOTES
Subjective     Patient ID: Marissa Rebolledo is a 79 y.o. female who presents for No chief complaint on file..    HPI    Allergies[1]    Objective     Current DM Pharmacotherapy:   -Humalog sliding scale  -glipizide 5 mg 1 daily    SECONDARY PREVENTION  - Statin? Yes  - ACE-I/ARB? Yes  - Aspirin? No    Current monitoring regimen:   Patient is using: continuous glucose monitor    Average 121    Pertinent PMH Review:  - PMH of Pancreatitis: No  - PMH/FH of Medullary Thyroid Cancer: No  - PMH of Retinopathy: No  - PMH of Urinary Tract Infections: No    Lab Review  Lab Results   Component Value Date    BILITOT 0.5 07/20/2025    CALCIUM 8.7 07/23/2025    CO2 26 07/23/2025     07/23/2025    CREATININE 1.17 (H) 07/23/2025    GLUCOSE 103 (H) 07/23/2025    ALKPHOS 101 07/20/2025    K 4.2 07/23/2025    PROT 8.5 (H) 07/20/2025     07/23/2025    AST 17 07/20/2025    ALT 13 07/20/2025    BUN 16 07/23/2025    ANIONGAP 10 07/23/2025    MG 1.86 07/22/2025    ALBUMIN 4.9 07/20/2025    LIPASE 24 07/20/2025    GFRF 63 08/01/2023     Lab Results   Component Value Date    TRIG 132 01/06/2025    CHOL 144 01/06/2025    LDLCALC 73 01/06/2025    HDL 44.6 01/06/2025     Lab Results   Component Value Date    HGBA1C 8.1 (H) 05/12/2025     The 10-year ASCVD risk score (Domi QUINN, et al., 2019) is: 55.1%    Values used to calculate the score:      Age: 79 years      Clincally relevant sex: Female      Is Non- : No      Diabetic: Yes      Tobacco smoker: No      Systolic Blood Pressure: 144 mmHg      Is BP treated: Yes      HDL Cholesterol: 44.6 mg/dL      Total Cholesterol: 144 mg/dL    Assessment/Plan   Patient was stopped on Trulicity due to dehydration and HELEN requiring hospitalization. She stopped Trulicity and metformin after hospitalization. Will hold her on current therapies and will follow up in 2 weeks to review BG readings.    Type 2 diabetes mellitus, is not at goal. <7%    CONTINUE glipizide 5 mg  daily  Follow up: I recommend diabetes care be 2 weeks.    Malissa Rincon, PharmD    Continue all meds under the continuation of care with the referring provider and clinical pharmacy team       [1]   Allergies  Allergen Reactions    Colchicine Other     Did not feel well with it    Metformin Headache and GI Upset     Abdominal pain    Mirtazapine Hives    Rosuvastatin Myalgia     Muscle spasm/weakness    Simvastatin Myalgia     Muscle spasms/weakness  Leg cramps     Sulfamethoxazole-Trimethoprim Hives    Jardiance [Empagliflozin] Other    Alendronic Acid Other    Clarithromycin Other     weak    Fosamax [Alendronate] Other     weak    Nitrofurantoin Monohyd/M-Cryst Other     weak    Ofloxacin Other     weakness

## 2025-08-08 ENCOUNTER — APPOINTMENT (OUTPATIENT)
Dept: PRIMARY CARE | Facility: CLINIC | Age: 80
End: 2025-08-08
Payer: MEDICARE

## 2025-08-08 VITALS
OXYGEN SATURATION: 98 % | SYSTOLIC BLOOD PRESSURE: 108 MMHG | DIASTOLIC BLOOD PRESSURE: 60 MMHG | BODY MASS INDEX: 29.63 KG/M2 | HEART RATE: 67 BPM | TEMPERATURE: 97.8 F | RESPIRATION RATE: 20 BRPM | WEIGHT: 162 LBS

## 2025-08-08 DIAGNOSIS — Z79.4 TYPE 2 DIABETES MELLITUS WITH STAGE 3A CHRONIC KIDNEY DISEASE, WITH LONG-TERM CURRENT USE OF INSULIN (MULTI): Primary | ICD-10-CM

## 2025-08-08 DIAGNOSIS — Z86.79 S/P ABLATION OPERATION FOR ARRHYTHMIA: ICD-10-CM

## 2025-08-08 DIAGNOSIS — K21.9 GASTROESOPHAGEAL REFLUX DISEASE, UNSPECIFIED WHETHER ESOPHAGITIS PRESENT: ICD-10-CM

## 2025-08-08 DIAGNOSIS — Z98.890 S/P ABLATION OPERATION FOR ARRHYTHMIA: ICD-10-CM

## 2025-08-08 DIAGNOSIS — E03.9 HYPOTHYROIDISM (ACQUIRED): ICD-10-CM

## 2025-08-08 DIAGNOSIS — I48.91 ATRIAL FIBRILLATION WITH RVR (MULTI): ICD-10-CM

## 2025-08-08 DIAGNOSIS — I48.0 PAROXYSMAL ATRIAL FIBRILLATION (MULTI): ICD-10-CM

## 2025-08-08 DIAGNOSIS — I10 HYPERTENSION, ESSENTIAL, BENIGN: ICD-10-CM

## 2025-08-08 DIAGNOSIS — N18.31 TYPE 2 DIABETES MELLITUS WITH STAGE 3A CHRONIC KIDNEY DISEASE, WITH LONG-TERM CURRENT USE OF INSULIN (MULTI): Primary | ICD-10-CM

## 2025-08-08 DIAGNOSIS — E11.22 TYPE 2 DIABETES MELLITUS WITH STAGE 3A CHRONIC KIDNEY DISEASE, WITH LONG-TERM CURRENT USE OF INSULIN (MULTI): Primary | ICD-10-CM

## 2025-08-08 LAB
ATRIAL RATE: 126 BPM
P AXIS: 85 DEGREES
P OFFSET: 199 MS
P ONSET: 145 MS
PR INTERVAL: 158 MS
Q ONSET: 224 MS
QRS COUNT: 21 BEATS
QRS DURATION: 74 MS
QT INTERVAL: 292 MS
QTC CALCULATION(BAZETT): 422 MS
QTC FREDERICIA: 374 MS
R AXIS: 18 DEGREES
T AXIS: 97 DEGREES
T OFFSET: 370 MS
VENTRICULAR RATE: 126 BPM

## 2025-08-08 PROCEDURE — 99215 OFFICE O/P EST HI 40 MIN: CPT | Performed by: FAMILY MEDICINE

## 2025-08-08 RX ORDER — LISINOPRIL 10 MG/1
10 TABLET ORAL DAILY
Qty: 30 TABLET | Refills: 3 | Status: SHIPPED | OUTPATIENT
Start: 2025-08-08 | End: 2025-12-06

## 2025-08-08 RX ORDER — METOPROLOL SUCCINATE 25 MG/1
25 TABLET, EXTENDED RELEASE ORAL DAILY
Qty: 90 TABLET | Refills: 1 | Status: SHIPPED | OUTPATIENT
Start: 2025-08-08 | End: 2026-02-04

## 2025-08-08 NOTE — PROGRESS NOTES
Subjective   Patient ID: Marissa Rebolledo is a 79 y.o. female who presents for Hospital Follow-up (She had started a new dose of Trulicity and shortly after she started feeling some nausea and some vomiting. She was told that she was severely dehydrated and that her sugar, heart rate, potassium all showed abnormal. She also was positive for UTI. They shocked her also due to being in AFIB./She is still feeling very week. ).    HPI  Patient comes in today for follow-up from recent hospitalization at [facility] from 7/20 to 7/23/2025.  She is not a good historian but apparently had been experiencing side effects from her Trulicity at home.  She had tried going up to a higher dose and the symptoms got worse and that she went to the hospital.  She was found however to be in A-fib with RVR along with having a UTI and diarrhea.  She apparently was cardioverted and given antibiotics for her UTI and she refuses to use the Trulicity again.  She was given an appointment with Dr. Reyes whom she does not have an appointment with until 9/15/2025.  In the meantime she is feeling a lot better.  I reviewed all her medication list with her today.  Currently she states that she is off metformin, off Trulicity, taking glipizide 2.5 mg once a day and lisinopril 10 mg daily.  Her blood pressure is good today so we will leave her lisinopril at 10 mg daily.  She is wearing a freestyle nickie 3 CGM currently and with that in place along with Humalog insulin for sliding scale coverage will however continue this until she sees Dr. Reyes next month.  She is using metoprolol succinate 12.5 mg twice daily but will have her change dose to 25 mg once a day.    Review of Systems   All other systems reviewed and are negative.      Objective   Vitals:  /60   Pulse 67   Temp 36.6 °C (97.8 °F)   Resp 20   Wt 73.5 kg (162 lb)   LMP  (LMP Unknown)   SpO2 98%   BMI 29.63 kg/m²     Physical Exam  Vitals reviewed.   Constitutional:        Appearance: She is well-developed.   HENT:      Head: Normocephalic and atraumatic.      Right Ear: Tympanic membrane, ear canal and external ear normal.      Left Ear: Tympanic membrane, ear canal and external ear normal.      Nose: Nose normal.      Mouth/Throat:      Mouth: Mucous membranes are moist.      Pharynx: Oropharynx is clear.     Eyes:      Extraocular Movements: Extraocular movements intact.      Conjunctiva/sclera: Conjunctivae normal.      Pupils: Pupils are equal, round, and reactive to light.       Cardiovascular:      Rate and Rhythm: Normal rate and regular rhythm.      Heart sounds: Normal heart sounds. No murmur heard.  Pulmonary:      Effort: Pulmonary effort is normal.      Breath sounds: Normal breath sounds. No wheezing.   Abdominal:      General: Abdomen is flat. Bowel sounds are normal.      Palpations: Abdomen is soft.     Musculoskeletal:         General: Normal range of motion.     Skin:     General: Skin is warm and dry.     Neurological:      General: No focal deficit present.      Mental Status: She is alert and oriented to person, place, and time. Mental status is at baseline.     Psychiatric:         Mood and Affect: Mood normal.         Behavior: Behavior normal.         Thought Content: Thought content normal.         Judgment: Judgment normal.         CBC -  Recent Labs     07/23/25  0523 07/21/25  0929 07/20/25  2353   WBC 8.9 12.8* 17.6*   HGB 11.7* 12.6 13.4   HCT 36.3 39.5 40.7    184 178   MCV 88 89 88       CMP -  Recent Labs     07/23/25  0523 07/22/25  0536 07/21/25  0929 07/20/25  2353    139 136 136   K 4.2 4.5 4.6 4.6    109* 104 105   CO2 26 26 24 20*   ANIONGAP 10 9* 13 16   BUN 16 14 22 26*   CREATININE 1.17* 1.33* 1.52* 1.50*   EGFR 48* 41* 35* 36*   MG  --  1.86 1.92 1.44*     Recent Labs     07/20/25  1227 05/12/25  1120 01/06/25  1025   ALBUMIN 4.9 4.6 4.0   ALKPHOS 101 76 80   ALT 13 15 28   AST 17 22 25   BILITOT 0.5 0.4 0.4       LIPID  PANEL -   Recent Labs     01/06/25  1025 06/24/24  1222 01/31/24  1305 05/01/23  1136 02/11/21  1432 10/17/19  1242   CHOL 144 122 148 150 144 144   LDLCALC 73 47 64  --   --   --    LDLF  --   --   --  77 73 75   HDL 44.6 32.6 39.2 49.4 37.0* 37.1*   TRIG 132 213* 224* 117 168* 162*       Recent Labs     05/12/25  1120 01/06/25  1025 06/27/24  1636 06/24/24  1222 01/16/23  1412 11/03/22  1437   BNP  --   --  321*  --   --  719*   HGBA1C 8.1* 7.9*  --  8.1*   < >  --     < > = values in this interval not displayed.       Lab Results   Component Value Date    [id number] 535 05/12/2025       Lab Results   Component Value Date    VITD25 37 05/12/2025        Assessment/Plan   Problem List Items Addressed This Visit       Type 2 diabetes mellitus with stage 3a chronic kidney disease, with long-term current use of insulin (Multi) - Primary    Overview   Continue use of freestyle nickie 3+  Follow-up with Dr. Reyes in September for further recommendations and treatment of her diabetes         Relevant Medications    metoprolol succinate XL (Toprol-XL) 25 mg 24 hr tablet    lisinopril 10 mg tablet    GERD (gastroesophageal reflux disease)    Hypertension, essential, benign    Overview   Stable on current medicines         Relevant Medications    metoprolol succinate XL (Toprol-XL) 25 mg 24 hr tablet    lisinopril 10 mg tablet    Hypothyroidism (acquired)    Relevant Medications    metoprolol succinate XL (Toprol-XL) 25 mg 24 hr tablet    S/P ablation operation for arrhythmia    Overview   Ablation for atrial fib 11/6/2024 [facility] Dr. Tucker  Ablation for atrial fib 7/20/2025 [facility]         Relevant Medications    metoprolol succinate XL (Toprol-XL) 25 mg 24 hr tablet    lisinopril 10 mg tablet     Other Visit Diagnoses         Atrial fibrillation with RVR (Multi)        Relevant Medications    metoprolol succinate XL (Toprol-XL) 25 mg 24 hr tablet      Paroxysmal atrial fibrillation (Multi)         Relevant Medications    metoprolol succinate XL (Toprol-XL) 25 mg 24 hr tablet        Follow-up with Dr. Reyes in September as scheduled  Continue current meds as directed.  Follow up in 2 months for recheck if all remains stable, sooner if problems arise.     Patient exhibiting no signs of depression and does not need treatment at this time.  Medication list reconciled.  Thank you for visiting today!        Professional services: Some of this note was completed using Dragon voice technology and sometimes the software misinterprets words. This may include unintended errors with respect to translation of words, typographical errors or grammar errors which may not have been identified prior to finalization of the chart note. Please take this into account when reading the note. Thank you.              Chris Contreras DO 08/08/25 4:30 PM

## 2025-08-13 ENCOUNTER — PATIENT OUTREACH (OUTPATIENT)
Dept: PRIMARY CARE | Facility: CLINIC | Age: 80
End: 2025-08-13

## 2025-08-13 ENCOUNTER — APPOINTMENT (OUTPATIENT)
Dept: PHARMACY | Facility: HOSPITAL | Age: 80
End: 2025-08-13
Payer: MEDICARE

## 2025-08-13 DIAGNOSIS — E11.9 TYPE 2 DIABETES MELLITUS WITHOUT COMPLICATION, WITHOUT LONG-TERM CURRENT USE OF INSULIN: ICD-10-CM

## 2025-08-19 ENCOUNTER — APPOINTMENT (OUTPATIENT)
Dept: CARDIOLOGY | Facility: CLINIC | Age: 80
End: 2025-08-19
Payer: MEDICARE

## 2025-08-21 DIAGNOSIS — E11.9 TYPE 2 DIABETES MELLITUS WITHOUT COMPLICATION, WITHOUT LONG-TERM CURRENT USE OF INSULIN: Primary | ICD-10-CM

## 2025-08-21 LAB
Q ONSET: 227 MS
QRS COUNT: 25 BEATS
QRS DURATION: 80 MS
QT INTERVAL: 254 MS
QTC CALCULATION(BAZETT): 401 MS
QTC FREDERICIA: 344 MS
R AXIS: -12 DEGREES
T AXIS: 114 DEGREES
T OFFSET: 354 MS
VENTRICULAR RATE: 150 BPM

## 2025-08-21 PROCEDURE — 93005 ELECTROCARDIOGRAM TRACING: CPT

## 2025-09-08 ENCOUNTER — APPOINTMENT (OUTPATIENT)
Dept: PHARMACY | Facility: HOSPITAL | Age: 80
End: 2025-09-08
Payer: MEDICARE

## 2025-09-30 ENCOUNTER — APPOINTMENT (OUTPATIENT)
Dept: CARDIOLOGY | Facility: CLINIC | Age: 80
End: 2025-09-30
Payer: MEDICARE

## 2025-10-08 ENCOUNTER — APPOINTMENT (OUTPATIENT)
Dept: PRIMARY CARE | Facility: CLINIC | Age: 80
End: 2025-10-08
Payer: MEDICARE

## 2025-12-17 ENCOUNTER — APPOINTMENT (OUTPATIENT)
Dept: ENDOCRINOLOGY | Facility: CLINIC | Age: 80
End: 2025-12-17
Payer: MEDICARE

## (undated) DEVICE — DEVICE, CLOSURE, PERCLOSE, PROSTYLE

## (undated) DEVICE — PAD, GROUNDING, ELECTROSURGICAL, W/9 FT CABLE, POLYHESIVE II, ADULT, LF

## (undated) DEVICE — Device

## (undated) DEVICE — ELECTRODE, MULTIFUNCTION, QUICK-COMBO, EDGE SYSTEM, 2 FT

## (undated) DEVICE — ARTERIAL LINE KIT, 20GA X 12CM, CUSTOM

## (undated) DEVICE — ELECTRODE KIT, ENSITE X EP SYSTEM SURFACE

## (undated) DEVICE — CATHETER, ELECTRODE, STEERABLE, EP-XT, LG CURVE, 6FX125CM, REPROCESSED

## (undated) DEVICE — CATHETER, TACTIFLEX, BI-D ABLATION, 8FR 2-2-2MM D-F CURVE

## (undated) DEVICE — SHEATH, PINNACLE, W/.038 GUIDEWIRE, 10 CM,  8FR INTRODUCER, 8FR DIA, 2.5 CM DIALATOR

## (undated) DEVICE — SHEATH, PINNACLE, W/.038 GUIDEWIRE, 10 CM,  7FR INTRODUCER, 7FR DIA, 2.5 CM DIALATOR

## (undated) DEVICE — CATHETER, ADVISOR HD GRID MAPPING, SENSOR ENABLED

## (undated) DEVICE — CLOSURE SYSTEM, VASCULAR, MVP 6-12FR, VENOUS

## (undated) DEVICE — INTRODUCER, AGILIS,  MEDIUM CURL, 8F X 71CM, DUAL

## (undated) DEVICE — CATHETER, VIEW FLEX XTRA ICE, 9FR (REPROCESSED)

## (undated) DEVICE — TUBING SET, COOL POINT, 2.6M, INDIVIDUAL